# Patient Record
Sex: FEMALE | Race: BLACK OR AFRICAN AMERICAN | NOT HISPANIC OR LATINO | Employment: UNEMPLOYED | URBAN - NONMETROPOLITAN AREA
[De-identification: names, ages, dates, MRNs, and addresses within clinical notes are randomized per-mention and may not be internally consistent; named-entity substitution may affect disease eponyms.]

---

## 2017-01-05 ENCOUNTER — TRANSCRIBE ORDERS (OUTPATIENT)
Dept: ADMINISTRATIVE | Facility: HOSPITAL | Age: 37
End: 2017-01-05

## 2017-01-05 ENCOUNTER — APPOINTMENT (OUTPATIENT)
Dept: LAB | Facility: HOSPITAL | Age: 37
End: 2017-01-05

## 2017-01-05 DIAGNOSIS — Z51.81 ENCOUNTER FOR MONITORING DIURETIC THERAPY: ICD-10-CM

## 2017-01-05 DIAGNOSIS — I15.9 SECONDARY HYPERTENSION: Primary | ICD-10-CM

## 2017-01-05 DIAGNOSIS — Z79.899 ENCOUNTER FOR MONITORING DIURETIC THERAPY: ICD-10-CM

## 2017-01-05 LAB
ANION GAP SERPL CALCULATED.3IONS-SCNC: 11 MMOL/L (ref 4–13)
CHLORIDE SERPL-SCNC: 103 MMOL/L (ref 98–110)
CO2 SERPL-SCNC: 26 MMOL/L (ref 24–31)
POTASSIUM BLD-SCNC: 3.4 MMOL/L (ref 3.5–5.3)
SODIUM BLD-SCNC: 140 MMOL/L (ref 135–145)

## 2017-01-05 PROCEDURE — 80051 ELECTROLYTE PANEL: CPT | Performed by: FAMILY MEDICINE

## 2017-01-05 PROCEDURE — 36415 COLL VENOUS BLD VENIPUNCTURE: CPT | Performed by: FAMILY MEDICINE

## 2017-05-10 ENCOUNTER — TRANSCRIBE ORDERS (OUTPATIENT)
Dept: ADMINISTRATIVE | Facility: HOSPITAL | Age: 37
End: 2017-05-10

## 2017-05-10 ENCOUNTER — APPOINTMENT (OUTPATIENT)
Dept: LAB | Facility: HOSPITAL | Age: 37
End: 2017-05-10

## 2017-05-10 DIAGNOSIS — I10 UNSPECIFIED ESSENTIAL HYPERTENSION: Primary | ICD-10-CM

## 2017-05-10 DIAGNOSIS — Z00.01 ENCOUNTER FOR GENERAL ADULT MEDICAL EXAMINATION WITH ABNORMAL FINDINGS: ICD-10-CM

## 2017-05-10 LAB
ALBUMIN SERPL-MCNC: 3.8 G/DL (ref 3.5–5)
ALBUMIN/GLOB SERPL: 1 G/DL (ref 1.1–2.5)
ALP SERPL-CCNC: 85 U/L (ref 24–120)
ALT SERPL W P-5'-P-CCNC: 29 U/L (ref 0–54)
ANION GAP SERPL CALCULATED.3IONS-SCNC: 13 MMOL/L (ref 4–13)
ARTICHOKE IGE QN: 157 MG/DL (ref 0–99)
AST SERPL-CCNC: 41 U/L (ref 7–45)
BASOPHILS # BLD AUTO: 0.02 10*3/MM3 (ref 0–0.2)
BASOPHILS NFR BLD AUTO: 0.4 % (ref 0–2)
BILIRUB SERPL-MCNC: 0.4 MG/DL (ref 0.1–1)
BUN BLD-MCNC: 8 MG/DL (ref 5–21)
BUN/CREAT SERPL: 10.7 (ref 7–25)
CALCIUM SPEC-SCNC: 9.1 MG/DL (ref 8.4–10.4)
CHLORIDE SERPL-SCNC: 104 MMOL/L (ref 98–110)
CHOLEST SERPL-MCNC: 220 MG/DL (ref 130–200)
CO2 SERPL-SCNC: 21 MMOL/L (ref 24–31)
CREAT BLD-MCNC: 0.75 MG/DL (ref 0.5–1.4)
DEPRECATED RDW RBC AUTO: 39.3 FL (ref 40–54)
EOSINOPHIL # BLD AUTO: 0.06 10*3/MM3 (ref 0–0.7)
EOSINOPHIL NFR BLD AUTO: 1.1 % (ref 0–4)
ERYTHROCYTE [DISTWIDTH] IN BLOOD BY AUTOMATED COUNT: 13.1 % (ref 12–15)
GFR SERPL CREATININE-BSD FRML MDRD: 105 ML/MIN/1.73
GLOBULIN UR ELPH-MCNC: 4 GM/DL
GLUCOSE BLD-MCNC: 87 MG/DL (ref 70–100)
HBA1C MFR BLD: 5.3 %
HCT VFR BLD AUTO: 39 % (ref 37–47)
HDLC SERPL-MCNC: 45 MG/DL
HGB BLD-MCNC: 13.9 G/DL (ref 12–16)
IMM GRANULOCYTES # BLD: 0.02 10*3/MM3 (ref 0–0.03)
IMM GRANULOCYTES NFR BLD: 0.4 % (ref 0–5)
LDLC/HDLC SERPL: 3.57 {RATIO}
LYMPHOCYTES # BLD AUTO: 2.05 10*3/MM3 (ref 0.72–4.86)
LYMPHOCYTES NFR BLD AUTO: 36.5 % (ref 15–45)
MCH RBC QN AUTO: 29.1 PG (ref 28–32)
MCHC RBC AUTO-ENTMCNC: 35.6 G/DL (ref 33–36)
MCV RBC AUTO: 81.8 FL (ref 82–98)
MONOCYTES # BLD AUTO: 0.67 10*3/MM3 (ref 0.19–1.3)
MONOCYTES NFR BLD AUTO: 11.9 % (ref 4–12)
NEUTROPHILS # BLD AUTO: 2.79 10*3/MM3 (ref 1.87–8.4)
NEUTROPHILS NFR BLD AUTO: 49.7 % (ref 39–78)
PLATELET # BLD AUTO: 290 10*3/MM3 (ref 130–400)
PMV BLD AUTO: 8.9 FL (ref 6–12)
POTASSIUM BLD-SCNC: 3.3 MMOL/L (ref 3.5–5.3)
PROT SERPL-MCNC: 7.8 G/DL (ref 6.3–8.7)
RBC # BLD AUTO: 4.77 10*6/MM3 (ref 4.2–5.4)
SODIUM BLD-SCNC: 138 MMOL/L (ref 135–145)
T4 FREE SERPL-MCNC: 0.94 NG/DL (ref 0.78–2.19)
TRIGL SERPL-MCNC: 72 MG/DL (ref 0–149)
TSH SERPL DL<=0.05 MIU/L-ACNC: 0.24 MIU/ML (ref 0.47–4.68)
WBC NRBC COR # BLD: 5.61 10*3/MM3 (ref 4.8–10.8)

## 2017-05-10 PROCEDURE — 83036 HEMOGLOBIN GLYCOSYLATED A1C: CPT | Performed by: FAMILY MEDICINE

## 2017-05-10 PROCEDURE — 85025 COMPLETE CBC W/AUTO DIFF WBC: CPT | Performed by: FAMILY MEDICINE

## 2017-05-10 PROCEDURE — 80061 LIPID PANEL: CPT | Performed by: FAMILY MEDICINE

## 2017-05-10 PROCEDURE — 84443 ASSAY THYROID STIM HORMONE: CPT | Performed by: FAMILY MEDICINE

## 2017-05-10 PROCEDURE — 80053 COMPREHEN METABOLIC PANEL: CPT | Performed by: FAMILY MEDICINE

## 2017-05-10 PROCEDURE — 36415 COLL VENOUS BLD VENIPUNCTURE: CPT | Performed by: FAMILY MEDICINE

## 2017-05-10 PROCEDURE — 84439 ASSAY OF FREE THYROXINE: CPT | Performed by: FAMILY MEDICINE

## 2017-05-18 ENCOUNTER — TRANSCRIBE ORDERS (OUTPATIENT)
Dept: ADMINISTRATIVE | Facility: HOSPITAL | Age: 37
End: 2017-05-18

## 2017-05-18 ENCOUNTER — APPOINTMENT (OUTPATIENT)
Dept: LAB | Facility: HOSPITAL | Age: 37
End: 2017-05-18

## 2017-05-18 DIAGNOSIS — E87.6 HYPOKALEMIA: Primary | ICD-10-CM

## 2017-05-18 LAB — POTASSIUM BLD-SCNC: 3.4 MMOL/L (ref 3.5–5.3)

## 2017-05-18 PROCEDURE — 84132 ASSAY OF SERUM POTASSIUM: CPT | Performed by: FAMILY MEDICINE

## 2017-05-18 PROCEDURE — 36415 COLL VENOUS BLD VENIPUNCTURE: CPT | Performed by: FAMILY MEDICINE

## 2017-06-28 ENCOUNTER — OFFICE VISIT (OUTPATIENT)
Dept: RETAIL CLINIC | Facility: CLINIC | Age: 37
End: 2017-06-28

## 2017-06-28 VITALS
OXYGEN SATURATION: 99 % | HEART RATE: 87 BPM | TEMPERATURE: 98.7 F | DIASTOLIC BLOOD PRESSURE: 90 MMHG | SYSTOLIC BLOOD PRESSURE: 112 MMHG

## 2017-06-28 DIAGNOSIS — H61.21 IMPACTED CERUMEN OF RIGHT EAR: Primary | ICD-10-CM

## 2017-06-28 PROCEDURE — 69209 REMOVE IMPACTED EAR WAX UNI: CPT | Performed by: NURSE PRACTITIONER

## 2017-06-28 PROCEDURE — 99202 OFFICE O/P NEW SF 15 MIN: CPT | Performed by: NURSE PRACTITIONER

## 2017-06-28 RX ORDER — OLANZAPINE 10 MG/1
TABLET ORAL
Refills: 3 | COMMUNITY
Start: 2017-05-03 | End: 2019-08-10 | Stop reason: HOSPADM

## 2017-06-28 NOTE — PROGRESS NOTES
Subjective   Danelle Estrada is a 36 y.o. female.     Hearing Problem   This is a new problem. The current episode started more than 1 month ago (1 1/2 months). The problem has been unchanged. Pertinent negatives include no chest pain, congestion, coughing, fever, nausea, sore throat or vomiting. Associated symptoms comments: Typically cleans ears with Q-tip to get wax out.  Difficulty hearing for past 1 1/2 months.  If patient lays on left side, very difficult to hear TV. Exacerbated by: Laying on left ear. Treatments tried: Put some alcohol in ear. The treatment provided no relief.        The following portions of the patient's history were reviewed and updated as appropriate: allergies, current medications, past family history, past medical history, past social history, past surgical history and problem list.    Review of Systems   Constitutional: Negative.  Negative for fever.   HENT: Negative for congestion, ear discharge, ear pain and sore throat.    Eyes: Negative.  Negative for discharge.   Respiratory: Negative for cough.    Cardiovascular: Negative for chest pain.   Gastrointestinal: Negative for diarrhea, nausea and vomiting.       Objective   Physical Exam   Constitutional: She appears well-developed and well-nourished. No distress.   HENT:   Right Ear: External ear normal. Tympanic membrane is not erythematous.   Left Ear: External ear normal. Tympanic membrane is not erythematous.   Nose: Right sinus exhibits no maxillary sinus tenderness and no frontal sinus tenderness. Left sinus exhibits no maxillary sinus tenderness and no frontal sinus tenderness.   Mouth/Throat: Oropharynx is clear and moist. No oropharyngeal exudate or posterior oropharyngeal erythema.   Prior to ear lavage, right TM completely blocked with cerumen.  Left TM visible with minimal cerumen near TM.    Neck: Neck supple.   Cardiovascular: Normal rate, regular rhythm and normal heart sounds.  Exam reveals no gallop and no friction  rub.    No murmur heard.  Pulmonary/Chest: Effort normal and breath sounds normal. No respiratory distress. She has no wheezes. She has no rales.   Lymphadenopathy:     She has no cervical adenopathy.   Neurological: She is alert.   Skin: Skin is warm. She is not diaphoretic.   Psychiatric: She has a normal mood and affect. Her behavior is normal.       Assessment/Plan   Danelle was seen today for hearing problem.    Diagnoses and all orders for this visit:    Impacted cerumen of right ear          Use Debrox ear drops as directed for 2-3 days and return for repeat flushing  Avoid Q-tips in ear

## 2017-06-28 NOTE — PATIENT INSTRUCTIONS
Use Debrox ear drops as directed for 2-3 days and return for repeat flushing  Avoid Q-tips in ear    Earwax Buildup  Your ears make a substance called earwax. It may also be called cerumen. Sometimes, too much earwax builds up in your ear canal. This can cause ear pain and make it harder for you to hear.  CAUSES  This condition is caused by too much earwax production or buildup.  RISK FACTORS  The following factors may make you more likely to develop this condition:  · Cleaning your ears often with swabs.  · Having narrow ear canals.  · Having earwax that is overly thick or sticky.  · Having eczema.  · Being dehydrated.  SYMPTOMS  Symptoms of this condition include:  · Reduced hearing.  · Ear drainage.  · Ear pain.  · Ear itch.  · A feeling of fullness in the ear or feeling that the ear is plugged.  · Ringing in the ear.  · Coughing.  DIAGNOSIS  Your health care provider can diagnose this condition based on your symptoms and medical history. Your health care provider will also do an ear exam to look inside your ear with a scope (otoscope). You may also have a hearing test.  TREATMENT  Treatment for this condition includes:  · Over-the-counter or prescription ear drops to soften the earwax.  · Earwax removal by a health care provider. This may be done:    By flushing the ear with body-temperature water.    With a medical instrument that has a loop at the end (earwax curette).    With a suction device.  HOME CARE INSTRUCTIONS  · Take over-the-counter and prescription medicines only as told by your health care provider.  · Do not put any objects, including an ear swab, into your ear. You can clean the opening of your ear canal with a washcloth.  · Drink enough water to keep your urine clear or pale yellow.  · If you have frequent earwax buildup or you use hearing aids, consider seeing your health care provider every 6-12 months for routine preventive ear cleanings. Keep all follow-up visits as told by your health care  provider.  SEEK MEDICAL CARE IF:  · You have ear pain.  · Your condition does not improve with treatment.  · You have hearing loss.  · You have blood, pus, or other fluid coming from your ear.     This information is not intended to replace advice given to you by your health care provider. Make sure you discuss any questions you have with your health care provider.     Document Released: 01/25/2006 Document Revised: 04/10/2017 Document Reviewed: 08/03/2016  [employer] Interactive Patient Education ©2017 [employer]

## 2017-06-28 NOTE — PROGRESS NOTES
Procedure   Ear Cerumen Removal Instrumentation  Date/Time: 6/28/2017 9:37 AM  Performed by: DAMIÁN MONTGOMERY  Authorized by: DAMIÁN MONTGOMERY  Consent: Verbal consent obtained.  Risks and benefits: risks, benefits and alternatives were discussed  Consent given by: patient  Patient understanding: patient states understanding of the procedure being performed  Local anesthetic: none  Location details: right ear  Procedure type: irrigation  Patient sedated: no  Patient tolerance: Patient tolerated the procedure well with no immediate complications  Comments: Moderate amount of cerumen flakes removed from canal.  TM exposed but not completely cleared of cerumen.  After 2 full bottles of tap water lavage, patient states hearing has improved.  Patient instructed to use Debrox in right ear as directed x 2-3 days and return for repeat flushing.

## 2017-07-05 ENCOUNTER — OFFICE VISIT (OUTPATIENT)
Dept: RETAIL CLINIC | Facility: CLINIC | Age: 37
End: 2017-07-05

## 2017-07-05 DIAGNOSIS — H61.23 BILATERAL IMPACTED CERUMEN: Primary | ICD-10-CM

## 2017-07-05 PROCEDURE — 69210 REMOVE IMPACTED EAR WAX UNI: CPT | Performed by: NURSE PRACTITIONER

## 2017-07-05 PROCEDURE — 99213 OFFICE O/P EST LOW 20 MIN: CPT | Performed by: NURSE PRACTITIONER

## 2017-07-05 RX ORDER — CIPROFLOXACIN AND DEXAMETHASONE 3; 1 MG/ML; MG/ML
4 SUSPENSION/ DROPS AURICULAR (OTIC) 2 TIMES DAILY
Qty: 7.5 ML | Refills: 0 | Status: SHIPPED | OUTPATIENT
Start: 2017-07-05 | End: 2019-08-17

## 2017-07-05 NOTE — PROGRESS NOTES
Chief Complaint   Patient presents with   • Cerumen Impaction     Subjective   Danelle Estrada is a 36 y.o. female who presents to the clinic today with complaints bilateral cerumen impaction. She has had hearing loss in her right ear. She was hear last week and she was instructed to use Debrox and come back. She has been using this in her right ear as instructed and started using in left ear yesterday. She is hearing a little better in right ear.   Hearing Problem   This is a new problem. The current episode started 1 to 4 weeks ago. The problem occurs constantly. The problem has been gradually improving. Pertinent negatives include no abdominal pain, anorexia, arthralgias, change in bowel habit, chest pain, chills, congestion, coughing, diaphoresis, fatigue, fever, headaches, joint swelling, myalgias, nausea, neck pain, numbness, rash, sore throat, swollen glands, urinary symptoms, vertigo, visual change, vomiting or weakness. Exacerbated by: Using Q-tips. Treatments tried: Debrox. The treatment provided mild relief.         Current Outpatient Prescriptions:   •  ciprofloxacin-dexamethasone (CIPRODEX) 0.3-0.1 % otic suspension, Administer 4 drops to the right ear 2 (Two) Times a Day., Disp: 7.5 mL, Rfl: 0  •  Multiple Vitamins-Minerals (MULTIVITAMIN ADULTS PO), Take  by mouth., Disp: , Rfl:   •  NON FORMULARY, Birth control, Disp: , Rfl:   •  OLANZapine (zyPREXA) 10 MG tablet, , Disp: , Rfl: 3  •  VERAPAMIL HCL ER PO, Take  by mouth., Disp: , Rfl:     Allergies:  Review of patient's allergies indicates no known allergies.    Past Medical History:   Diagnosis Date   • Allergic    • Bipolar disorder    • Depression    • Hypertension      History reviewed. No pertinent surgical history.  History reviewed. No pertinent family history.  Social History   Substance Use Topics   • Smoking status: Never Smoker   • Smokeless tobacco: Never Used   • Alcohol use No       Review of Systems  Review of Systems    Constitutional: Negative.  Negative for chills, diaphoresis, fatigue and fever.   HENT: Positive for hearing loss (right ear). Negative for congestion and sore throat.    Eyes: Negative.    Respiratory: Negative.  Negative for cough.    Cardiovascular: Negative.  Negative for chest pain.   Gastrointestinal: Negative.  Negative for abdominal pain, anorexia, change in bowel habit, nausea and vomiting.   Endocrine: Negative.    Genitourinary: Negative.    Musculoskeletal: Negative.  Negative for arthralgias, joint swelling, myalgias and neck pain.   Skin: Negative.  Negative for rash.   Allergic/Immunologic: Negative.    Neurological: Negative.  Negative for vertigo, weakness, numbness and headaches.   Hematological: Negative.    Psychiatric/Behavioral: Negative.        Objective   There were no vitals taken for this visit.      Physical Exam   Constitutional: She is oriented to person, place, and time. She appears well-nourished.   HENT:   Head: Normocephalic and atraumatic.   Right Ear: External ear and ear canal normal. There is tenderness. Tympanic membrane is erythematous. Decreased hearing (minimal view of TM. After cerumen removal TM visible and hearing much better ) is noted.   Left Ear: Hearing, tympanic membrane and external ear normal. No decreased hearing (cerumen covering half of TM. After cerumen, removal TM was visible. ) is noted.   Pulmonary/Chest: Effort normal.   Neurological: She is alert and oriented to person, place, and time.   Skin: Skin is warm and dry.   Psychiatric: She has a normal mood and affect. Her behavior is normal.       Assessment/Plan     Danelle was seen today for cerumen impaction.    Diagnoses and all orders for this visit:    Bilateral impacted cerumen  -     Ear Cerumen Removal Instrumentation    Other orders  -     ciprofloxacin-dexamethasone (CIPRODEX) 0.3-0.1 % otic suspension; Administer 4 drops to the right ear 2 (Two) Times a Day.

## 2017-07-05 NOTE — PROGRESS NOTES
Procedure   Ear Cerumen Removal Instrumentation  Date/Time: 7/5/2017 11:12 AM  Performed by: LEONARD MCDONALD  Authorized by: LEONARD MCDONALD  Consent: Verbal consent obtained.  Consent given by: patient  Patient understanding: patient states understanding of the procedure being performed  Patient consent: the patient's understanding of the procedure matches consent given  Patient identity confirmed: verbally with patient and provided demographic data  Local anesthetic: none  Location details: bilateral ears  Procedure type: curette and irrigation  Patient sedated: no  Patient tolerance: Patient tolerated the procedure well with no immediate complications  Comments: Erythema of right ear canal and tenderness with manipulation. Ciprodex to right ear.

## 2017-07-05 NOTE — PATIENT INSTRUCTIONS
Earwax Buildup  Your ears make a substance called earwax. It may also be called cerumen. Sometimes, too much earwax builds up in your ear canal. This can cause ear pain and make it harder for you to hear.  CAUSES  This condition is caused by too much earwax production or buildup.  RISK FACTORS  The following factors may make you more likely to develop this condition:  · Cleaning your ears often with swabs.  · Having narrow ear canals.  · Having earwax that is overly thick or sticky.  · Having eczema.  · Being dehydrated.  SYMPTOMS  Symptoms of this condition include:  · Reduced hearing.  · Ear drainage.  · Ear pain.  · Ear itch.  · A feeling of fullness in the ear or feeling that the ear is plugged.  · Ringing in the ear.  · Coughing.  DIAGNOSIS  Your health care provider can diagnose this condition based on your symptoms and medical history. Your health care provider will also do an ear exam to look inside your ear with a scope (otoscope). You may also have a hearing test.  TREATMENT  Treatment for this condition includes:  · Over-the-counter or prescription ear drops to soften the earwax.  · Earwax removal by a health care provider. This may be done:    By flushing the ear with body-temperature water.    With a medical instrument that has a loop at the end (earwax curette).    With a suction device.  HOME CARE INSTRUCTIONS  · Take over-the-counter and prescription medicines only as told by your health care provider.  · Do not put any objects, including an ear swab, into your ear. You can clean the opening of your ear canal with a washcloth.  · Drink enough water to keep your urine clear or pale yellow.  · If you have frequent earwax buildup or you use hearing aids, consider seeing your health care provider every 6-12 months for routine preventive ear cleanings. Keep all follow-up visits as told by your health care provider.  SEEK MEDICAL CARE IF:  · You have ear pain.  · Your condition does not improve with  treatment.  · You have hearing loss.  · You have blood, pus, or other fluid coming from your ear.     This information is not intended to replace advice given to you by your health care provider. Make sure you discuss any questions you have with your health care provider.     Document Released: 01/25/2006 Document Revised: 04/10/2017 Document Reviewed: 08/03/2016  [employer] Interactive Patient Education ©2017 [employer]

## 2018-05-09 ENCOUNTER — APPOINTMENT (OUTPATIENT)
Dept: LAB | Facility: HOSPITAL | Age: 38
End: 2018-05-09

## 2018-05-09 ENCOUNTER — TRANSCRIBE ORDERS (OUTPATIENT)
Dept: ADMINISTRATIVE | Facility: HOSPITAL | Age: 38
End: 2018-05-09

## 2018-05-09 DIAGNOSIS — I10 HYPERTENSION, UNSPECIFIED TYPE: ICD-10-CM

## 2018-05-09 DIAGNOSIS — Z00.01 ENCOUNTER FOR GENERAL ADULT MEDICAL EXAMINATION WITH ABNORMAL FINDINGS: Primary | ICD-10-CM

## 2018-05-09 LAB
ALBUMIN SERPL-MCNC: 3.8 G/DL (ref 3.5–5)
ALBUMIN/GLOB SERPL: 1 G/DL (ref 1.1–2.5)
ALP SERPL-CCNC: 85 U/L (ref 24–120)
ALT SERPL W P-5'-P-CCNC: 22 U/L (ref 0–54)
ANION GAP SERPL CALCULATED.3IONS-SCNC: 10 MMOL/L (ref 4–13)
ARTICHOKE IGE QN: 164 MG/DL (ref 0–99)
AST SERPL-CCNC: 27 U/L (ref 7–45)
BASOPHILS # BLD AUTO: 0.04 10*3/MM3 (ref 0–0.2)
BASOPHILS NFR BLD AUTO: 0.7 % (ref 0–2)
BILIRUB SERPL-MCNC: 0.5 MG/DL (ref 0.1–1)
BUN BLD-MCNC: 9 MG/DL (ref 5–21)
BUN/CREAT SERPL: 10.7 (ref 7–25)
CALCIUM SPEC-SCNC: 9.8 MG/DL (ref 8.4–10.4)
CHLORIDE SERPL-SCNC: 107 MMOL/L (ref 98–110)
CHOLEST SERPL-MCNC: 219 MG/DL (ref 130–200)
CO2 SERPL-SCNC: 24 MMOL/L (ref 24–31)
CREAT BLD-MCNC: 0.84 MG/DL (ref 0.5–1.4)
DEPRECATED RDW RBC AUTO: 37 FL (ref 40–54)
EOSINOPHIL # BLD AUTO: 0.04 10*3/MM3 (ref 0–0.7)
EOSINOPHIL NFR BLD AUTO: 0.7 % (ref 0–4)
ERYTHROCYTE [DISTWIDTH] IN BLOOD BY AUTOMATED COUNT: 12.2 % (ref 12–15)
GFR SERPL CREATININE-BSD FRML MDRD: 91 ML/MIN/1.73
GLOBULIN UR ELPH-MCNC: 3.8 GM/DL
GLUCOSE BLD-MCNC: 84 MG/DL (ref 70–100)
HCT VFR BLD AUTO: 40.6 % (ref 37–47)
HDLC SERPL-MCNC: 39 MG/DL
HGB BLD-MCNC: 14.2 G/DL (ref 12–16)
IMM GRANULOCYTES # BLD: 0.02 10*3/MM3 (ref 0–0.03)
IMM GRANULOCYTES NFR BLD: 0.3 % (ref 0–5)
LDLC/HDLC SERPL: 4.25 {RATIO}
LYMPHOCYTES # BLD AUTO: 1.8 10*3/MM3 (ref 0.72–4.86)
LYMPHOCYTES NFR BLD AUTO: 30.6 % (ref 15–45)
MCH RBC QN AUTO: 29.5 PG (ref 28–32)
MCHC RBC AUTO-ENTMCNC: 35 G/DL (ref 33–36)
MCV RBC AUTO: 84.2 FL (ref 82–98)
MONOCYTES # BLD AUTO: 0.69 10*3/MM3 (ref 0.19–1.3)
MONOCYTES NFR BLD AUTO: 11.7 % (ref 4–12)
NEUTROPHILS # BLD AUTO: 3.3 10*3/MM3 (ref 1.87–8.4)
NEUTROPHILS NFR BLD AUTO: 56 % (ref 39–78)
NRBC BLD MANUAL-RTO: 0 /100 WBC (ref 0–0)
PLATELET # BLD AUTO: 308 10*3/MM3 (ref 130–400)
PMV BLD AUTO: 8.6 FL (ref 6–12)
POTASSIUM BLD-SCNC: 4.2 MMOL/L (ref 3.5–5.3)
PROT SERPL-MCNC: 7.6 G/DL (ref 6.3–8.7)
RBC # BLD AUTO: 4.82 10*6/MM3 (ref 4.2–5.4)
SODIUM BLD-SCNC: 141 MMOL/L (ref 135–145)
T4 FREE SERPL-MCNC: 1.06 NG/DL (ref 0.78–2.19)
TRIGL SERPL-MCNC: 72 MG/DL (ref 0–149)
TSH SERPL DL<=0.05 MIU/L-ACNC: 0.43 MIU/ML (ref 0.47–4.68)
WBC NRBC COR # BLD: 5.89 10*3/MM3 (ref 4.8–10.8)

## 2018-05-09 PROCEDURE — 36415 COLL VENOUS BLD VENIPUNCTURE: CPT | Performed by: FAMILY MEDICINE

## 2018-05-09 PROCEDURE — 80053 COMPREHEN METABOLIC PANEL: CPT | Performed by: FAMILY MEDICINE

## 2018-05-09 PROCEDURE — 84443 ASSAY THYROID STIM HORMONE: CPT | Performed by: FAMILY MEDICINE

## 2018-05-09 PROCEDURE — 85025 COMPLETE CBC W/AUTO DIFF WBC: CPT | Performed by: FAMILY MEDICINE

## 2018-05-09 PROCEDURE — 80061 LIPID PANEL: CPT | Performed by: FAMILY MEDICINE

## 2018-05-09 PROCEDURE — 84439 ASSAY OF FREE THYROXINE: CPT | Performed by: FAMILY MEDICINE

## 2018-09-05 ENCOUNTER — TRANSCRIBE ORDERS (OUTPATIENT)
Dept: ADMINISTRATIVE | Facility: HOSPITAL | Age: 38
End: 2018-09-05

## 2018-09-05 ENCOUNTER — APPOINTMENT (OUTPATIENT)
Dept: LAB | Facility: HOSPITAL | Age: 38
End: 2018-09-05

## 2018-09-05 DIAGNOSIS — E78.00 ELEVATED CHOLESTEROL: Primary | ICD-10-CM

## 2018-09-05 LAB — CHOLEST SERPL-MCNC: 173 MG/DL (ref 130–200)

## 2018-09-05 PROCEDURE — 36415 COLL VENOUS BLD VENIPUNCTURE: CPT

## 2018-09-05 PROCEDURE — 82465 ASSAY BLD/SERUM CHOLESTEROL: CPT | Performed by: FAMILY MEDICINE

## 2019-05-28 ENCOUNTER — TRANSCRIBE ORDERS (OUTPATIENT)
Dept: ADMINISTRATIVE | Facility: HOSPITAL | Age: 39
End: 2019-05-28

## 2019-05-28 ENCOUNTER — APPOINTMENT (OUTPATIENT)
Dept: LAB | Facility: HOSPITAL | Age: 39
End: 2019-05-28

## 2019-05-28 DIAGNOSIS — Z00.01 ENCOUNTER FOR GENERAL ADULT MEDICAL EXAMINATION WITH ABNORMAL FINDINGS: Primary | ICD-10-CM

## 2019-05-28 LAB
ALBUMIN SERPL-MCNC: 3.8 G/DL (ref 3.5–5)
ALBUMIN/GLOB SERPL: 1.2 G/DL (ref 1.1–2.5)
ALP SERPL-CCNC: 69 U/L (ref 24–120)
ALT SERPL W P-5'-P-CCNC: 62 U/L (ref 0–54)
ANION GAP SERPL CALCULATED.3IONS-SCNC: 10 MMOL/L (ref 4–13)
ARTICHOKE IGE QN: 111 MG/DL (ref 0–99)
AST SERPL-CCNC: 46 U/L (ref 7–45)
BASOPHILS # BLD AUTO: 0.03 10*3/MM3 (ref 0–0.2)
BASOPHILS NFR BLD AUTO: 0.5 % (ref 0–2)
BILIRUB SERPL-MCNC: 0.4 MG/DL (ref 0.1–1)
BUN BLD-MCNC: 5 MG/DL (ref 5–21)
BUN/CREAT SERPL: 7.6 (ref 7–25)
CALCIUM SPEC-SCNC: 8.8 MG/DL (ref 8.4–10.4)
CHLORIDE SERPL-SCNC: 99 MMOL/L (ref 98–110)
CHOLEST SERPL-MCNC: 182 MG/DL (ref 130–200)
CO2 SERPL-SCNC: 22 MMOL/L (ref 24–31)
CREAT BLD-MCNC: 0.66 MG/DL (ref 0.5–1.4)
DEPRECATED RDW RBC AUTO: 39.6 FL (ref 40–54)
EOSINOPHIL # BLD AUTO: 0.05 10*3/MM3 (ref 0–0.7)
EOSINOPHIL NFR BLD AUTO: 0.8 % (ref 0–4)
ERYTHROCYTE [DISTWIDTH] IN BLOOD BY AUTOMATED COUNT: 12.5 % (ref 12–15)
GFR SERPL CREATININE-BSD FRML MDRD: 120 ML/MIN/1.73
GLOBULIN UR ELPH-MCNC: 3.3 GM/DL
GLUCOSE BLD-MCNC: 69 MG/DL (ref 70–100)
HCT VFR BLD AUTO: 37.8 % (ref 37–47)
HDLC SERPL-MCNC: 41 MG/DL
HGB BLD-MCNC: 13 G/DL (ref 12–16)
IMM GRANULOCYTES # BLD AUTO: 0.02 10*3/MM3 (ref 0–0.05)
IMM GRANULOCYTES NFR BLD AUTO: 0.3 % (ref 0–5)
LDLC/HDLC SERPL: 2.98 {RATIO}
LYMPHOCYTES # BLD AUTO: 1.75 10*3/MM3 (ref 0.72–4.86)
LYMPHOCYTES NFR BLD AUTO: 28.1 % (ref 15–45)
MCH RBC QN AUTO: 29.7 PG (ref 28–32)
MCHC RBC AUTO-ENTMCNC: 34.4 G/DL (ref 33–36)
MCV RBC AUTO: 86.3 FL (ref 82–98)
MONOCYTES # BLD AUTO: 0.61 10*3/MM3 (ref 0.19–1.3)
MONOCYTES NFR BLD AUTO: 9.8 % (ref 4–12)
NEUTROPHILS # BLD AUTO: 3.77 10*3/MM3 (ref 1.87–8.4)
NEUTROPHILS NFR BLD AUTO: 60.5 % (ref 39–78)
NRBC BLD AUTO-RTO: 0 /100 WBC (ref 0–0.2)
PLATELET # BLD AUTO: 317 10*3/MM3 (ref 130–400)
PMV BLD AUTO: 8.8 FL (ref 6–12)
POTASSIUM BLD-SCNC: 4.2 MMOL/L (ref 3.5–5.3)
PROT SERPL-MCNC: 7.1 G/DL (ref 6.3–8.7)
RBC # BLD AUTO: 4.38 10*6/MM3 (ref 4.2–5.4)
SODIUM BLD-SCNC: 131 MMOL/L (ref 135–145)
T4 FREE SERPL-MCNC: 0.8 NG/DL (ref 0.78–2.19)
TRIGL SERPL-MCNC: 94 MG/DL (ref 0–149)
TSH SERPL DL<=0.05 MIU/L-ACNC: 0.15 MIU/ML (ref 0.47–4.68)
WBC NRBC COR # BLD: 6.23 10*3/MM3 (ref 4.8–10.8)

## 2019-05-28 PROCEDURE — 84443 ASSAY THYROID STIM HORMONE: CPT | Performed by: FAMILY MEDICINE

## 2019-05-28 PROCEDURE — 36415 COLL VENOUS BLD VENIPUNCTURE: CPT | Performed by: FAMILY MEDICINE

## 2019-05-28 PROCEDURE — 80053 COMPREHEN METABOLIC PANEL: CPT | Performed by: FAMILY MEDICINE

## 2019-05-28 PROCEDURE — 85025 COMPLETE CBC W/AUTO DIFF WBC: CPT | Performed by: FAMILY MEDICINE

## 2019-05-28 PROCEDURE — 80061 LIPID PANEL: CPT | Performed by: FAMILY MEDICINE

## 2019-05-28 PROCEDURE — 84439 ASSAY OF FREE THYROXINE: CPT | Performed by: FAMILY MEDICINE

## 2019-06-07 ENCOUNTER — NURSE TRIAGE (OUTPATIENT)
Dept: CALL CENTER | Facility: HOSPITAL | Age: 39
End: 2019-06-07

## 2019-06-07 NOTE — TELEPHONE ENCOUNTER
"She has herpes can she be an organ donor? Told her further testing would have to be done at the time and that she is not totally ruled out per intranet.     Reason for Disposition  • General information question, no triage required and triager able to answer question    Additional Information  • Negative: [1] Caller is not with the adult (patient) AND [2] reporting urgent symptoms  • Negative: Lab result questions  • Negative: Medication questions  • Negative: Caller cannot be reached by phone  • Negative: Caller has already spoken to PCP or another triager  • Negative: RN needs further essential information from caller in order to complete triage  • Negative: Requesting regular office appointment  • Negative: [1] Caller requesting NON-URGENT health information AND [2] PCP's office is the best resource  • Negative: Health Information question, no triage required and triager able to answer question  • Negative: Question about upcoming scheduled test, no triage required and triager able to answer question  • Negative: [1] Caller is not with the adult (patient) AND [2] probable NON-URGENT symptoms    Answer Assessment - Initial Assessment Questions  1. REASON FOR CALL or QUESTION: \"What is your reason for calling today?\" or \"How can I best help you?\" or \"What question do you have that I can help answer?\"      Wanting to know if she can donate organs with herpes    Protocols used: INFORMATION ONLY CALL-ADULT-      "

## 2019-06-19 ENCOUNTER — TRANSCRIBE ORDERS (OUTPATIENT)
Dept: ADMINISTRATIVE | Facility: HOSPITAL | Age: 39
End: 2019-06-19

## 2019-06-19 ENCOUNTER — APPOINTMENT (OUTPATIENT)
Dept: LAB | Facility: HOSPITAL | Age: 39
End: 2019-06-19

## 2019-06-19 DIAGNOSIS — E16.2 HYPOGLYCEMIA: Primary | ICD-10-CM

## 2019-06-19 DIAGNOSIS — E87.1 HYPONATREMIA: ICD-10-CM

## 2019-06-19 LAB
ALBUMIN SERPL-MCNC: 3.9 G/DL (ref 3.5–5)
ALBUMIN/GLOB SERPL: 1.2 G/DL (ref 1.1–2.5)
ALP SERPL-CCNC: 82 U/L (ref 24–120)
ALT SERPL W P-5'-P-CCNC: 30 U/L (ref 0–54)
ANION GAP SERPL CALCULATED.3IONS-SCNC: 11 MMOL/L (ref 4–13)
AST SERPL-CCNC: 38 U/L (ref 7–45)
BILIRUB SERPL-MCNC: 0.4 MG/DL (ref 0.1–1)
BUN BLD-MCNC: 3 MG/DL (ref 5–21)
BUN/CREAT SERPL: 4.6 (ref 7–25)
CALCIUM SPEC-SCNC: 9.1 MG/DL (ref 8.4–10.4)
CHLORIDE SERPL-SCNC: 102 MMOL/L (ref 98–110)
CO2 SERPL-SCNC: 22 MMOL/L (ref 24–31)
CREAT BLD-MCNC: 0.65 MG/DL (ref 0.5–1.4)
GFR SERPL CREATININE-BSD FRML MDRD: 122 ML/MIN/1.73
GLOBULIN UR ELPH-MCNC: 3.3 GM/DL
GLUCOSE BLD-MCNC: 79 MG/DL (ref 70–100)
POTASSIUM BLD-SCNC: 3.9 MMOL/L (ref 3.5–5.3)
PROT SERPL-MCNC: 7.2 G/DL (ref 6.3–8.7)
SODIUM BLD-SCNC: 135 MMOL/L (ref 135–145)

## 2019-06-19 PROCEDURE — 36415 COLL VENOUS BLD VENIPUNCTURE: CPT | Performed by: FAMILY MEDICINE

## 2019-06-19 PROCEDURE — 80053 COMPREHEN METABOLIC PANEL: CPT | Performed by: FAMILY MEDICINE

## 2019-07-03 ENCOUNTER — NURSE TRIAGE (OUTPATIENT)
Dept: CALL CENTER | Facility: HOSPITAL | Age: 39
End: 2019-07-03

## 2019-07-03 NOTE — TELEPHONE ENCOUNTER
"She is calling because she would like to know names of Endrocrinologist. In [place]. Explained that [place] does not have these type of providers. Offered to call Mad. She denied.     Reason for Disposition  • Health Information question, no triage required and triager able to answer question    Additional Information  • Negative: [1] Caller is not with the adult (patient) AND [2] reporting urgent symptoms  • Negative: Lab result questions  • Negative: Medication questions  • Negative: Caller cannot be reached by phone  • Negative: Caller has already spoken to PCP or another triager  • Negative: RN needs further essential information from caller in order to complete triage  • Negative: Requesting regular office appointment  • Negative: [1] Caller requesting NON-URGENT health information AND [2] PCP's office is the best resource    Answer Assessment - Initial Assessment Questions  1. REASON FOR CALL or QUESTION: \"What is your reason for calling today?\" or \"How can I best help you?\" or \"What question do you have that I can help answer?\"      See note    Protocols used: INFORMATION ONLY CALL-ADULT-      "

## 2019-07-07 ENCOUNTER — NURSE TRIAGE (OUTPATIENT)
Dept: CALL CENTER | Facility: HOSPITAL | Age: 39
End: 2019-07-07

## 2019-07-07 NOTE — TELEPHONE ENCOUNTER
"Information given to caller regarding a local endocrinologist.  Recommended Dr. Powers at [facility].    Reason for Disposition  • General information question, no triage required and triager able to answer question    Additional Information  • Negative: [1] Caller is not with the adult (patient) AND [2] reporting urgent symptoms  • Negative: Lab result questions  • Negative: Medication questions  • Negative: Caller cannot be reached by phone  • Negative: Caller has already spoken to PCP or another triager  • Negative: RN needs further essential information from caller in order to complete triage  • Negative: Requesting regular office appointment  • Negative: [1] Caller requesting NON-URGENT health information AND [2] PCP's office is the best resource  • Negative: Health Information question, no triage required and triager able to answer question    Answer Assessment - Initial Assessment Questions  1. REASON FOR CALL or QUESTION: \"What is your reason for calling today?\" or \"How can I best help you?\" or \"What question do you have that I can help answer?\"      Is there and endocrinologist in [place]?    Protocols used: INFORMATION ONLY CALL-ADULT-      "

## 2019-07-24 ENCOUNTER — APPOINTMENT (OUTPATIENT)
Dept: LAB | Facility: HOSPITAL | Age: 39
End: 2019-07-24

## 2019-07-24 ENCOUNTER — TRANSCRIBE ORDERS (OUTPATIENT)
Dept: LAB | Facility: HOSPITAL | Age: 39
End: 2019-07-24

## 2019-07-24 DIAGNOSIS — Z01.818 PRE-OP EVALUATION: Primary | ICD-10-CM

## 2019-07-24 LAB
ANION GAP SERPL CALCULATED.3IONS-SCNC: 6 MMOL/L (ref 4–13)
BUN BLD-MCNC: 8 MG/DL (ref 5–21)
BUN/CREAT SERPL: 11.6 (ref 7–25)
CALCIUM SPEC-SCNC: 9.4 MG/DL (ref 8.4–10.4)
CHLORIDE SERPL-SCNC: 107 MMOL/L (ref 98–110)
CO2 SERPL-SCNC: 24 MMOL/L (ref 24–31)
CREAT BLD-MCNC: 0.69 MG/DL (ref 0.5–1.4)
DEPRECATED RDW RBC AUTO: 39.3 FL (ref 40–54)
ERYTHROCYTE [DISTWIDTH] IN BLOOD BY AUTOMATED COUNT: 12.5 % (ref 12–15)
GFR SERPL CREATININE-BSD FRML MDRD: 114 ML/MIN/1.73
GLUCOSE BLD-MCNC: 109 MG/DL (ref 70–100)
HCT VFR BLD AUTO: 36.9 % (ref 37–47)
HGB BLD-MCNC: 12.7 G/DL (ref 12–16)
MCH RBC QN AUTO: 29.8 PG (ref 28–32)
MCHC RBC AUTO-ENTMCNC: 34.4 G/DL (ref 33–36)
MCV RBC AUTO: 86.6 FL (ref 82–98)
PLATELET # BLD AUTO: 303 10*3/MM3 (ref 130–400)
PMV BLD AUTO: 8.6 FL (ref 6–12)
POTASSIUM BLD-SCNC: 4.3 MMOL/L (ref 3.5–5.3)
RBC # BLD AUTO: 4.26 10*6/MM3 (ref 4.2–5.4)
SODIUM BLD-SCNC: 137 MMOL/L (ref 135–145)
WBC NRBC COR # BLD: 4.46 10*3/MM3 (ref 4.8–10.8)

## 2019-07-24 PROCEDURE — 85027 COMPLETE CBC AUTOMATED: CPT | Performed by: PLASTIC SURGERY

## 2019-07-24 PROCEDURE — 36415 COLL VENOUS BLD VENIPUNCTURE: CPT | Performed by: PLASTIC SURGERY

## 2019-07-24 PROCEDURE — 80048 BASIC METABOLIC PNL TOTAL CA: CPT | Performed by: PLASTIC SURGERY

## 2019-08-09 ENCOUNTER — HOSPITAL ENCOUNTER (OUTPATIENT)
Facility: HOSPITAL | Age: 39
Setting detail: OBSERVATION
Discharge: HOME | End: 2019-08-10
Attending: EMERGENCY MEDICINE | Admitting: PLASTIC SURGERY

## 2019-08-09 DIAGNOSIS — I10 ESSENTIAL HYPERTENSION: ICD-10-CM

## 2019-08-09 DIAGNOSIS — Z98.890 POST-OPERATIVE STATE: Primary | ICD-10-CM

## 2019-08-09 LAB
ALBUMIN SERPL-MCNC: 3.6 G/DL (ref 3.5–5)
ALBUMIN/GLOB SERPL: 1.2 G/DL (ref 1.1–2.5)
ALP SERPL-CCNC: 72 U/L (ref 24–120)
ALT SERPL W P-5'-P-CCNC: 28 U/L (ref 0–54)
ANION GAP SERPL CALCULATED.3IONS-SCNC: 7 MMOL/L (ref 4–13)
AST SERPL-CCNC: 24 U/L (ref 7–45)
BILIRUB SERPL-MCNC: 0.3 MG/DL (ref 0.1–1)
BUN BLD-MCNC: 7 MG/DL (ref 5–21)
BUN/CREAT SERPL: 8.9 (ref 7–25)
CALCIUM SPEC-SCNC: 8.7 MG/DL (ref 8.4–10.4)
CHLORIDE SERPL-SCNC: 104 MMOL/L (ref 98–110)
CO2 SERPL-SCNC: 26 MMOL/L (ref 24–31)
CREAT BLD-MCNC: 0.79 MG/DL (ref 0.5–1.4)
DEPRECATED RDW RBC AUTO: 39.3 FL (ref 37–54)
ERYTHROCYTE [DISTWIDTH] IN BLOOD BY AUTOMATED COUNT: 12.2 % (ref 12.3–15.4)
GFR SERPL CREATININE-BSD FRML MDRD: 98 ML/MIN/1.73
GLOBULIN UR ELPH-MCNC: 3.1 GM/DL
GLUCOSE BLD-MCNC: 142 MG/DL (ref 70–100)
HCT VFR BLD AUTO: 36.9 % (ref 34–46.6)
HGB BLD-MCNC: 12.7 G/DL (ref 12–15.9)
MCH RBC QN AUTO: 30.2 PG (ref 26.6–33)
MCHC RBC AUTO-ENTMCNC: 34.4 G/DL (ref 31.5–35.7)
MCV RBC AUTO: 87.9 FL (ref 79–97)
PLATELET # BLD AUTO: 303 10*3/MM3 (ref 140–450)
PMV BLD AUTO: 8.2 FL (ref 6–12)
POTASSIUM BLD-SCNC: 4.3 MMOL/L (ref 3.5–5.3)
PROT SERPL-MCNC: 6.7 G/DL (ref 6.3–8.7)
RBC # BLD AUTO: 4.2 10*6/MM3 (ref 3.77–5.28)
SODIUM BLD-SCNC: 137 MMOL/L (ref 135–145)
TSH SERPL DL<=0.05 MIU/L-ACNC: 0.15 MIU/ML (ref 0.47–4.68)
WBC NRBC COR # BLD: 10.96 10*3/MM3 (ref 3.4–10.8)

## 2019-08-09 PROCEDURE — 94760 N-INVAS EAR/PLS OXIMETRY 1: CPT

## 2019-08-09 PROCEDURE — G0378 HOSPITAL OBSERVATION PER HR: HCPCS

## 2019-08-09 PROCEDURE — 84443 ASSAY THYROID STIM HORMONE: CPT | Performed by: HOSPITALIST

## 2019-08-09 PROCEDURE — 80053 COMPREHEN METABOLIC PANEL: CPT | Performed by: HOSPITALIST

## 2019-08-09 PROCEDURE — 94799 UNLISTED PULMONARY SVC/PX: CPT

## 2019-08-09 PROCEDURE — 85027 COMPLETE CBC AUTOMATED: CPT | Performed by: HOSPITALIST

## 2019-08-09 PROCEDURE — 36415 COLL VENOUS BLD VENIPUNCTURE: CPT | Performed by: HOSPITALIST

## 2019-08-09 PROCEDURE — 93005 ELECTROCARDIOGRAM TRACING: CPT | Performed by: HOSPITALIST

## 2019-08-09 PROCEDURE — 84439 ASSAY OF FREE THYROXINE: CPT | Performed by: HOSPITALIST

## 2019-08-09 PROCEDURE — 99284 EMERGENCY DEPT VISIT MOD MDM: CPT

## 2019-08-09 RX ORDER — QUETIAPINE FUMARATE 200 MG/1
200 TABLET, FILM COATED ORAL NIGHTLY
Status: DISCONTINUED | OUTPATIENT
Start: 2019-08-09 | End: 2019-08-10 | Stop reason: HOSPADM

## 2019-08-09 RX ORDER — ONDANSETRON 4 MG/1
4 TABLET, FILM COATED ORAL EVERY 6 HOURS PRN
Status: DISCONTINUED | OUTPATIENT
Start: 2019-08-09 | End: 2019-08-10 | Stop reason: HOSPADM

## 2019-08-09 RX ORDER — AMLODIPINE BESYLATE 10 MG/1
10 TABLET ORAL
Status: DISCONTINUED | OUTPATIENT
Start: 2019-08-09 | End: 2019-08-09

## 2019-08-09 RX ORDER — HYDRALAZINE HYDROCHLORIDE 20 MG/ML
10 INJECTION INTRAMUSCULAR; INTRAVENOUS EVERY 6 HOURS PRN
Status: DISCONTINUED | OUTPATIENT
Start: 2019-08-09 | End: 2019-08-10 | Stop reason: HOSPADM

## 2019-08-09 RX ORDER — SODIUM CHLORIDE 0.9 % (FLUSH) 0.9 %
3-10 SYRINGE (ML) INJECTION AS NEEDED
Status: DISCONTINUED | OUTPATIENT
Start: 2019-08-09 | End: 2019-08-10 | Stop reason: HOSPADM

## 2019-08-09 RX ORDER — ARIPIPRAZOLE 10 MG/1
10 TABLET ORAL DAILY
Status: DISCONTINUED | OUTPATIENT
Start: 2019-08-09 | End: 2019-08-10 | Stop reason: HOSPADM

## 2019-08-09 RX ORDER — HYDROCODONE BITARTRATE AND ACETAMINOPHEN 5; 325 MG/1; MG/1
1 TABLET ORAL EVERY 4 HOURS PRN
Status: DISCONTINUED | OUTPATIENT
Start: 2019-08-09 | End: 2019-08-10 | Stop reason: HOSPADM

## 2019-08-09 RX ORDER — METOPROLOL TARTRATE 5 MG/5ML
5 INJECTION INTRAVENOUS EVERY 6 HOURS PRN
Status: DISCONTINUED | OUTPATIENT
Start: 2019-08-09 | End: 2019-08-10 | Stop reason: HOSPADM

## 2019-08-09 RX ORDER — LISINOPRIL 10 MG/1
10 TABLET ORAL
Status: DISCONTINUED | OUTPATIENT
Start: 2019-08-09 | End: 2019-08-10 | Stop reason: HOSPADM

## 2019-08-09 RX ORDER — LISINOPRIL 5 MG/1
5 TABLET ORAL ONCE
Status: DISCONTINUED | OUTPATIENT
Start: 2019-08-09 | End: 2019-08-09

## 2019-08-09 RX ORDER — LISINOPRIL 5 MG/1
5 TABLET ORAL ONCE
Qty: 1 TABLET | Refills: 0 | Status: SHIPPED | OUTPATIENT
Start: 2019-08-09 | End: 2021-10-22

## 2019-08-09 RX ORDER — SODIUM CHLORIDE 0.9 % (FLUSH) 0.9 %
3 SYRINGE (ML) INJECTION EVERY 12 HOURS SCHEDULED
Status: DISCONTINUED | OUTPATIENT
Start: 2019-08-09 | End: 2019-08-10 | Stop reason: HOSPADM

## 2019-08-09 RX ADMIN — SODIUM CHLORIDE, PRESERVATIVE FREE 3 ML: 5 INJECTION INTRAVENOUS at 21:03

## 2019-08-09 RX ADMIN — LISINOPRIL 10 MG: 10 TABLET ORAL at 17:49

## 2019-08-09 RX ADMIN — HYDROCODONE BITARTRATE AND ACETAMINOPHEN 1 TABLET: 5; 325 TABLET ORAL at 21:08

## 2019-08-09 RX ADMIN — ARIPIPRAZOLE 10 MG: 10 TABLET ORAL at 17:49

## 2019-08-09 RX ADMIN — VERAPAMIL HYDROCHLORIDE 180 MG: 180 TABLET, FILM COATED, EXTENDED RELEASE ORAL at 17:49

## 2019-08-09 RX ADMIN — QUETIAPINE FUMARATE 200 MG: 200 TABLET ORAL at 21:03

## 2019-08-09 NOTE — CONSULTS
[facility] Medicine Consult  Consults    Date of Admission: 8/9/2019  Date of Consult: 08/09/19    Primary Care Physician: Sunny Palmer MD  Referring Physician: Dr. Austin Moore    Chief Complaint/Reason for Consultation: BP management    Subjective   History of Present Illness  Patient is a 38-year-old female who had plastic surgery today with Dr. Moore.  She had a rhinoplasty and upper lid blepharoplasty.  Her postoperative course was complicated by uncontrolled hypertension.  Patient has a known history of hypertension and normally takes verapamil and lisinopril at nights.  She states that this usually keeps her blood pressure normal.  Her last night was 117/81.  She states that today she felt stressed by the procedure and she was in pain and thinks that that may have bumped her blood pressure.  In any event she needed multiple doses of nitroglycerin, Nitropaste, beta-blocker and nifedipine.  She was subsequently sent to the emergency room and admitted for overnight observation.  In the office, her blood pressure was estimated to be as high as 190/110 and in the emergency room it was 145/88.    Review of Systems   Constitutional: No fevers, no chills, no fatigue  Eye: No discharge, no visual disturbances, no recent visual problems  Ear/nose/throat: No decreased hearing, no vertigo, no dysphagia, no sore throat  Respiratory: No shortness of breath, no cough, no sputum production  Cardiovascular: No chest pain, no palpitations, no syncope, no peripheral edema  Gastrointestinal: No abdominal pain, no nausea, no vomiting, no diarrhea, no constipation  Urinary: No dysuria, no hematuria, no change in urinary stream  Hematology/lymphatics: No bruising tendency, no bleeding tendency, no swollen lymph node  Endocrine: No excessive thirst, no polyuria, no excessive hunger  Immunologic: No recurrent fevers, no recurrent infections, not immunocompromised  Musculoskeletal: No  trauma, no back pain, normal ROM   Integumentary: No rash, pruritus, no skin breakdown  Neurologic: Alert and oriented x4, no confusion, no numbness, no tingling, no abnormal balance  Psychiatric: No suicidal ideation    Past Medical History:   Past Medical History:   Diagnosis Date   • Allergic    • Bipolar disorder (CMS/HCC)    • Depression    • H/O schizophrenia    • Hypertension        Past Surgical History: States that she has no history of surgeries  Social History: Patient is a lifelong non-smoker.  She uses no alcohol or street drugs.  Family History: Mother is alive and healthy.  Father  of unknown causes.  Allergies: No known drug allergies    Medications: Scheduled Meds:  ARIPiprazole 10 mg Oral Daily   lisinopril 10 mg Oral Q24H   QUEtiapine 200 mg Oral Nightly   sodium chloride 3 mL Intravenous Q12H   verapamil  mg Oral Q24H     Continuous Infusions:   PRN Meds:.hydrALAZINE  •  HYDROcodone-acetaminophen  •  metoprolol tartrate  •  ondansetron  •  sodium chloride    Objective     Physical Exam:   Temp:  [97.8 °F (36.6 °C)-98.4 °F (36.9 °C)] 97.8 °F (36.6 °C)  Heart Rate:  [52-59] 57  Resp:  [10-18] 18  BP: (117-154)/(76-88) 143/78    Constitutional: Patient is oriented to person, place, and time and well-developed, well-nourished, and in no distress.   Head: Normocephalic and atraumatic.   Eyes: Conjunctivae and EOM are normal. Pupils are equal, round, and reactive to light.   Neck: Normal range of motion. Neck supple. No JVD present. No tracheal deviation present. No thyromegaly present.   Cardiovascular: Normal rate, regular rhythm and normal heart sounds.   Pulmonary/Chest: Effort normal and breath sounds normal. No stridor. No respiratory distress. No wheezes or crackles. Patient exhibits no chest tenderness.   Abdominal: Soft. Bowel sounds are normal. Patient exhibits no distension and no mass. There is no tenderness. There is no rebound and no guarding.   Musculoskeletal: Normal range of  motion. Patient exhibits no edema or deformity.   Neurological: Patient is neurological grossly intact.   Skin: Skin is warm and dry. No rash noted. No erythema.   Psychiatric: Mood and affect normal.     Results Reviewed:  I have personally reviewed current lab, radiology and data.    Lab Results (last 24 hours)     Procedure Component Value Units Date/Time    Comprehensive Metabolic Panel [[id number]] Collected:  08/09/19 1657    Specimen:  Blood Updated:  08/09/19 1700    CBC (No Diff) [[id number]] Collected:  08/09/19 1657    Specimen:  Blood Updated:  08/09/19 1700    TSH [[id number]] Collected:  08/09/19 1657    Specimen:  Blood Updated:  08/09/19 1700          Imaging Results (last 24 hours)     ** No results found for the last 24 hours. **          Assessment / Plan     Active Hospital Problems    Diagnosis   • Post-operative state      Hypertension  Uncontrolled postoperatively.  Patient thinks it is because of pain and the stress of the procedure.  Since she is normally controlled with verapamil 180 mg daily and lisinopril 10 mg daily, will resume this and also placed on as needed IV hydralazine and Lopressor.  Twelve-lead EKG is pending.  TSH is pending.    Psychiatric issues  Patient has a history of major depression, bipolar disorder and schizophrenia.  Will resume her Seroquel and Abilify     Postoperative state  Patient has diffuse facial edema and nasal packs and splints and will defer to Dr. Moore management    DVT precautions  Bilateral SCDs to lower extremities    Tests pending  EKG and admission labs including TSH    I discussed the patient's findings and my recommendations with patient.     Gabbi Faulkner MD  08/09/19  5:01 PM    TIME SPENT: 50  minutes

## 2019-08-09 NOTE — H&P
"    Danelle Estrada  is a 38 y.o.  female. Patients  1980    Chief Complaint   Patient presents with   • Hypertension       HPI: The patient is a 38-year-old black female who underwent a rhinoplasty and upper lid blepharoplasty in my office operating room with no intraoperative complications or problems.  Postoperatively, however, the patient had elevated blood pressures which required multiple medications including nitroglycerin, Nitropaste, beta blockers, and nifedipine.  These medications were helpful in keeping her blood pressure from getting excessively high however, they required multiple repeat dosages.  It was therefore decided the patient should be admitted overnight for observation and blood pressure control rather than discharged.      Past Medical History:   Diagnosis Date   • Allergic    • Bipolar disorder (CMS/HCC)    • Depression    • H/O schizophrenia    • Hypertension      Allergies:  Patient has no known allergies.    Social History     Tobacco Use   • Smoking status: Unknown If Ever Smoked   • Smokeless tobacco: Never Used   Substance Use Topics   • Alcohol use: No   • Drug use: Not on file     Comment: uknown       (Not in a hospital admission)  History reviewed. No pertinent surgical history.                Review of Systems : No additional pertinent clinical information gleaned.     Physical Examination     Height:   157.5 cm (62\")          19  1511   Weight: 65.8 kg (145 lb)       Temp:  [98.4 °F (36.9 °C)] 98.4 °F (36.9 °C)  Heart Rate:  [57] 57  Resp:  [10] 10  BP: (138)/(85) 138/85    HEENT: The patient has diffuse facial edema following postsurgical changes there are nasal packs and a nasal splint in place and sutures along the upper eyelid.    NECK: supple    LUNGS: CTA     HEART: RRR    ABDOMEN: Soft, non-tender, NABS, No HSM    Extremities: No cyanosis, clubbing, or edema     SKIN: Warm and Dry    ADDITIONAL FINDINGS:         I discussed the patients findings and my " recommendations with patient.    Austin Moore MD  08/09/19  3:30 PM

## 2019-08-09 NOTE — ED PROVIDER NOTES
Subjective   This patient had a rhinoplasty done in the office of Dr. Moore, after surgery her blood pressure shot up and he has several maneuvers to try to get her blood pressure under improved control and giving beta including giving a beta-blocker giving Procardia and also said he was given her some sublingual nitro.  He was concerned about the fact that this control was not adequate and wants to now admit the patient to the hospital.  The patient is a somnolent but is arousable after having surgery and she does answer questions appropriately.            Review of Systems   Constitutional: Negative.    Respiratory: Negative.    Cardiovascular: Negative.    Gastrointestinal: Negative.    Musculoskeletal: Negative.    Neurological: Negative.        Past Medical History:   Diagnosis Date   • Allergic    • Bipolar disorder (CMS/HCC)    • Depression    • H/O schizophrenia    • Hypertension        No Known Allergies    History reviewed. No pertinent surgical history.    History reviewed. No pertinent family history.    Social History     Socioeconomic History   • Marital status: Single     Spouse name: Not on file   • Number of children: Not on file   • Years of education: Not on file   • Highest education level: Not on file   Tobacco Use   • Smoking status: Unknown If Ever Smoked   • Smokeless tobacco: Never Used   Substance and Sexual Activity   • Alcohol use: No           Objective   Physical Exam   Constitutional: She is oriented to person, place, and time. She appears well-developed and well-nourished.   HENT:   This patient is a recent postop patient and has edema and swelling of the mid face and around the nose she has tubes in each nostril which were placed by the plastic surgeon.  Her voice is normal no sign of any airway problems.   Cardiovascular: Normal rate and regular rhythm.   Pulmonary/Chest: Effort normal and breath sounds normal.   Abdominal: Soft. Bowel sounds are normal.   Musculoskeletal: Normal  range of motion.   Neurological: She is alert and oriented to person, place, and time.   Nursing note and vitals reviewed.      Procedures           ED Course                  MDM  Number of Diagnoses or Management Options  Diagnosis management comments: Dr Moore is present in the ED and is willing to admit to his service.  I spoke to Dr Faulkner, the Hospitalist who is willing to see the patient and manage her hypertension.          Final diagnoses:   Post-operative state   Essential hypertension            Adam Yancey,   08/09/19 1542

## 2019-08-09 NOTE — PLAN OF CARE
Problem: Patient Care Overview  Goal: Plan of Care Review  Outcome: Ongoing (interventions implemented as appropriate)   08/09/19 1810   Coping/Psychosocial   Plan of Care Reviewed With patient   Plan of Care Review   Progress no change   OTHER   Outcome Summary patient received from ER with elevated blood pressure. patient had rhinoplasty bilateral and upper eye lid blepharoplasty in Dr. Moore office and he sent her to ER. patient with tiny sutures to eyelids and bilateral nostrils with packing. eyes and nares oozing blood. patient resting in bed and sleeping between care. no complaints of pain. monitor blood pressure and pain     Goal: Individualization and Mutuality  Outcome: Ongoing (interventions implemented as appropriate)    Goal: Discharge Needs Assessment  Outcome: Ongoing (interventions implemented as appropriate)      Problem: Hypertensive Disease/Crisis (Arterial) (Adult)  Goal: Signs and Symptoms of Listed Potential Problems Will be Absent, Minimized or Managed (Hypertensive Disease/Crisis)  Outcome: Ongoing (interventions implemented as appropriate)

## 2019-08-10 VITALS
HEIGHT: 62 IN | OXYGEN SATURATION: 99 % | DIASTOLIC BLOOD PRESSURE: 85 MMHG | RESPIRATION RATE: 16 BRPM | BODY MASS INDEX: 26.68 KG/M2 | TEMPERATURE: 98.8 F | SYSTOLIC BLOOD PRESSURE: 136 MMHG | HEART RATE: 100 BPM | WEIGHT: 145 LBS

## 2019-08-10 LAB — T4 FREE SERPL-MCNC: 0.98 NG/DL (ref 0.78–2.19)

## 2019-08-10 PROCEDURE — 94799 UNLISTED PULMONARY SVC/PX: CPT

## 2019-08-10 PROCEDURE — G0378 HOSPITAL OBSERVATION PER HR: HCPCS

## 2019-08-10 PROCEDURE — 93010 ELECTROCARDIOGRAM REPORT: CPT | Performed by: INTERNAL MEDICINE

## 2019-08-10 RX ADMIN — HYDROCODONE BITARTRATE AND ACETAMINOPHEN 1 TABLET: 5; 325 TABLET ORAL at 04:03

## 2019-08-10 RX ADMIN — ARIPIPRAZOLE 10 MG: 10 TABLET ORAL at 08:24

## 2019-08-10 RX ADMIN — LISINOPRIL 10 MG: 10 TABLET ORAL at 08:24

## 2019-08-10 RX ADMIN — VERAPAMIL HYDROCHLORIDE 180 MG: 180 TABLET, FILM COATED, EXTENDED RELEASE ORAL at 08:24

## 2019-08-10 NOTE — PROGRESS NOTES
Called about patient being discharged by primary, however patient left before I saw her.  Her blood pressure was under control.  Her EKG was normal.  Her TSH was decreased but her free T4 was normal.  Patient was advised to follow-up with her primary care provider about her thyroid dysfunction.

## 2019-08-10 NOTE — PLAN OF CARE
Problem: Patient Care Overview  Goal: Plan of Care Review  Outcome: Ongoing (interventions implemented as appropriate)   08/10/19 0338   Coping/Psychosocial   Plan of Care Reviewed With patient   Plan of Care Review   Progress no change   OTHER   Outcome Summary Pt having some c/o pain to incisional face sites; prn meds given with relief. Forehead incisions with some sm amount of drainage; nose with some bloody drainage throughout the shift. IV in place IID. BP WDL throughout this shift. Up ad lucila. Voiding. VSS. Will cont to monitor.      Goal: Discharge Needs Assessment  Outcome: Ongoing (interventions implemented as appropriate)   08/09/19 1715 08/09/19 1716 08/09/19 1810   Discharge Needs Assessment   Readmission Within the Last 30 Days --  --  no previous admission in last 30 days   Concerns to be Addressed --  --  no discharge needs identified   Patient/Family Anticipates Transition to --  home --    Patient/Family Anticipated Services at Transition --  none --    Transportation Anticipated --  family or friend will provide --    Anticipated Changes Related to Illness --  --  none   Equipment Needed After Discharge --  --  none   Offered/Gave Vendor List --  --  no   Disability   Equipment Currently Used at Home none --  --      Goal: Interprofessional Rounds/Family Conf  Outcome: Ongoing (interventions implemented as appropriate)   08/10/19 0338   Interdisciplinary Rounds/Family Conf   Participants nursing;patient       Problem: Hypertensive Disease/Crisis (Arterial) (Adult)  Goal: Signs and Symptoms of Listed Potential Problems Will be Absent, Minimized or Managed (Hypertensive Disease/Crisis)  Outcome: Ongoing (interventions implemented as appropriate)   08/10/19 0338   Goal/Outcome Evaluation   Problems Assessed (Hypertensive Disease/Crisis (Arterial)) all   Problems Present (Hypertensive Disease) none       Problem: Pain, Acute (Adult)  Goal: Identify Related Risk Factors and Signs and Symptoms  Outcome:  Ongoing (interventions implemented as appropriate)   08/10/19 0338   Pain, Acute (Adult)   Related Risk Factors (Acute Pain) surgery   Signs and Symptoms (Acute Pain) verbalization of pain descriptors     Goal: Acceptable Pain Control/Comfort Level  Outcome: Ongoing (interventions implemented as appropriate)   08/10/19 0338   Pain, Acute (Adult)   Acceptable Pain Control/Comfort Level making progress toward outcome

## 2019-08-10 NOTE — DISCHARGE SUMMARY
This patient is a 38-year-old black female who underwent rhinoplasty and eyelid surgery yesterday and postoperatively had uncontrolled hypertension.  It was therefore decided the patient should be it admitted for overnight observation and blood pressure management.    Physical examination at the time of admission revealed a well-developed well-nourished black female in no acute distress despite postoperative surgery changes.  The patient had diffuse facial edema and ecchymosis a nasal splint was applied was present and the patient had nasal packing in place.  Her blood pressure at the time of presentation to the emergency room in order to be registered for admission was 148/84.  The remainder of the physical examination was unremarkable.    Hospital course: The patient was admitted for overnight observation with an IV in place EKG and lab work were normal and the patient required no further medical intervention for her hypertension overnight.  It was therefore decided the patient could be safely discharged home on her routine antihypertensive medications.    Disposition: Patient is being discharged home.  She is instructed to follow-up with me in 2 days for removal of nasal packing and sutures from the eyelids she has a prescription for Norco 5 mg she is to leave the nasal splint in position and resume all of her preoperative medications.    Complications this admission: Postoperative hypertension    Consultations: Hospitalist for hypertension management    Attending physician: Oscar

## 2019-08-17 ENCOUNTER — OFFICE VISIT (OUTPATIENT)
Dept: RETAIL CLINIC | Facility: CLINIC | Age: 39
End: 2019-08-17

## 2019-08-17 DIAGNOSIS — Z53.21 PATIENT LEFT WITHOUT BEING SEEN: Primary | ICD-10-CM

## 2019-08-17 NOTE — PROGRESS NOTES
Discovered she had a refill available for erythromycin ophthalmic ointment and elected to leave without being seen.    Evie Causey APRN

## 2019-09-20 ENCOUNTER — OFFICE VISIT (OUTPATIENT)
Dept: OBSTETRICS AND GYNECOLOGY | Facility: CLINIC | Age: 39
End: 2019-09-20

## 2019-09-20 VITALS
HEIGHT: 62 IN | SYSTOLIC BLOOD PRESSURE: 128 MMHG | DIASTOLIC BLOOD PRESSURE: 76 MMHG | WEIGHT: 138 LBS | BODY MASS INDEX: 25.4 KG/M2

## 2019-09-20 DIAGNOSIS — Z12.31 SCREENING MAMMOGRAM, ENCOUNTER FOR: ICD-10-CM

## 2019-09-20 DIAGNOSIS — Z30.41 ENCOUNTER FOR SURVEILLANCE OF CONTRACEPTIVE PILLS: Primary | ICD-10-CM

## 2019-09-20 PROCEDURE — 99203 OFFICE O/P NEW LOW 30 MIN: CPT | Performed by: OBSTETRICS & GYNECOLOGY

## 2019-09-20 RX ORDER — LISINOPRIL 5 MG/1
5 TABLET ORAL DAILY
Refills: 1 | COMMUNITY
Start: 2019-08-30 | End: 2021-10-22 | Stop reason: SDUPTHER

## 2019-09-20 RX ORDER — NORGESTIMATE AND ETHINYL ESTRADIOL 0.25-0.035
1 KIT ORAL DAILY
Qty: 28 TABLET | Refills: 3 | Status: SHIPPED | OUTPATIENT
Start: 2019-09-20 | End: 2019-12-10 | Stop reason: SDUPTHER

## 2019-09-20 RX ORDER — ARIPIPRAZOLE 10 MG/1
TABLET ORAL
COMMUNITY
Start: 2019-09-09 | End: 2020-03-23

## 2019-09-20 RX ORDER — ACYCLOVIR 400 MG/1
TABLET ORAL
Refills: 1 | COMMUNITY
Start: 2019-08-30 | End: 2019-09-25 | Stop reason: SDUPTHER

## 2019-09-20 RX ORDER — QUETIAPINE FUMARATE 200 MG/1
200 TABLET, FILM COATED ORAL NIGHTLY
COMMUNITY
Start: 2019-09-09

## 2019-09-20 NOTE — PROGRESS NOTES
Subjective   Danelle Estrada is a 39 y.o. female  YOB: 1980        Chief Complaint   Patient presents with   • Contraception     Pt is here for contraception  Pt was being seen at the  and they no longer take her insurance.  PT needs refill on OCP  Last pap was 10/18 that was normal        39 year old female  LMP 2019 presents for contraceptive management. Patient reports that she was previously seen and evaluated by the health department and received her annual examination as well as PAP smear there. She reports that her cycles are regular lasting for four days. She denies any smoking at this time. Reports that she is not currently sexually active. Last mammogram was in  and was normal as per patient. Her last PAP smear was in  and was negative for intraepithelial lesion.       Contraception         No Known Allergies    Past Medical History:   Diagnosis Date   • Allergic    • Bipolar disorder (CMS/HCC)    • Depression    • H/O schizophrenia    • Hypertension        Family History   Problem Relation Age of Onset   • No Known Problems Father    • No Known Problems Mother    • No Known Problems Brother    • No Known Problems Sister        Social History     Socioeconomic History   • Marital status: Single     Spouse name: Not on file   • Number of children: Not on file   • Years of education: Not on file   • Highest education level: Not on file   Tobacco Use   • Smoking status: Never Smoker   • Smokeless tobacco: Never Used   Substance and Sexual Activity   • Alcohol use: No   • Drug use: No     Comment: uknown   • Sexual activity: Yes     Partners: Male     Birth control/protection: OCP         Current Outpatient Medications:   •  acyclovir (ZOVIRAX) 400 MG tablet, TK 1 T PO TID PRN FOR FEVER BLISTERS, Disp: , Rfl: 1  •  ARIPiprazole (ABILIFY) 10 MG tablet, , Disp: , Rfl:   •  lisinopril (PRINIVIL,ZESTRIL) 5 MG tablet, Take  by mouth Daily., Disp: , Rfl: 1  •  Multiple  "Vitamins-Minerals (MULTIVITAMIN ADULTS PO), Take  by mouth., Disp: , Rfl:   •  QUEtiapine (SEROquel) 200 MG tablet, , Disp: , Rfl:   •  VERAPAMIL HCL ER PO, Take  by mouth., Disp: , Rfl:   •  erythromycin (ROMYCIN) 5 MG/GM ophthalmic ointment, Apply to suture line three times daily as directed, Disp: 3.5 g, Rfl: 1  •  HYDROcodone-acetaminophen (NORCO) 7.5-325 MG per tablet, Take 1 tablet by mouth every 4 hours as needed for pain, Disp: 40 tablet, Rfl: 0  •  NON FORMULARY, Birth control, Disp: , Rfl:   •  norgestimate-ethinyl estradiol (SPRINTEC 28) 0.25-35 MG-MCG per tablet, Take 1 tablet by mouth Daily., Disp: 28 tablet, Rfl: 3    Patient's last menstrual period was 08/30/2019 (exact date).    Sexual History:         Could not be calculated    Past Surgical History:   Procedure Laterality Date   • EYE SURGERY     • RHINOPLASTY     • WISDOM TOOTH EXTRACTION         Review of Systems   Genitourinary: Negative for amenorrhea, menstrual problem, vaginal bleeding and vaginal discharge.       Objective   Physical Exam   Constitutional: She is oriented to person, place, and time. She appears well-developed and well-nourished. No distress.   HENT:   Head: Normocephalic and atraumatic.   Neck: Normal range of motion.   Cardiovascular: Normal rate and regular rhythm.   No murmur heard.  Pulmonary/Chest: Effort normal and breath sounds normal. She has no wheezes.   Abdominal: Soft. She exhibits no distension. There is no tenderness.   Musculoskeletal: Normal range of motion.   Neurological: She is alert and oriented to person, place, and time.   Skin: Skin is warm and dry.   Psychiatric: She has a normal mood and affect. Her behavior is normal. Judgment normal.   Nursing note and vitals reviewed.        Vitals:    09/20/19 1007 09/20/19 1032   BP: 132/82 128/76   Weight: 62.6 kg (138 lb)    Height: 157.5 cm (62\")        Danelle was seen today for contraception.    Diagnoses and all orders for this visit:    Encounter for " surveillance of contraceptive pills    Screening mammogram, encounter for  -     Mammo Screening Digital Tomosynthesis Bilateral With CAD; Future    Other orders  -     norgestimate-ethinyl estradiol (SPRINTEC 28) 0.25-35 MG-MCG per tablet; Take 1 tablet by mouth Daily.    -Script for OCP sent at this time. Risk, benefits discussed.   -Order for mammogram placed.   -RTC for annual examination.     Bre Jordan,

## 2019-09-25 ENCOUNTER — OFFICE VISIT (OUTPATIENT)
Dept: RETAIL CLINIC | Facility: CLINIC | Age: 39
End: 2019-09-25

## 2019-09-25 VITALS
RESPIRATION RATE: 16 BRPM | OXYGEN SATURATION: 98 % | DIASTOLIC BLOOD PRESSURE: 85 MMHG | TEMPERATURE: 97.6 F | SYSTOLIC BLOOD PRESSURE: 129 MMHG | HEART RATE: 98 BPM

## 2019-09-25 DIAGNOSIS — B00.1 RECURRENT COLD SORES: Primary | ICD-10-CM

## 2019-09-25 PROCEDURE — 99213 OFFICE O/P EST LOW 20 MIN: CPT | Performed by: NURSE PRACTITIONER

## 2019-09-25 RX ORDER — DOCOSANOL 100 MG/G
CREAM TOPICAL
Qty: 2 G | Refills: 3 | Status: SHIPPED | OUTPATIENT
Start: 2019-09-25 | End: 2019-10-02

## 2019-09-25 RX ORDER — DIPHENHYDRAMINE HCL 25 MG
25 CAPSULE ORAL EVERY 6 HOURS PRN
COMMUNITY

## 2019-09-25 RX ORDER — ACYCLOVIR 400 MG/1
800 TABLET ORAL 3 TIMES DAILY
Qty: 30 TABLET | Refills: 2 | Status: SHIPPED | OUTPATIENT
Start: 2019-09-25 | End: 2022-02-24 | Stop reason: SDUPTHER

## 2019-09-25 NOTE — PROGRESS NOTES
Chief Complaint   Patient presents with   • Blister     Subjective   Danelle Estrada is a 39 y.o. female who presents to the clinic today with complaints a fever blister. Her primary care provider usually gives her acyclovir, but she feels the topical antivirals are better .She has mild allergy or cold symptoms and started to develop symptoms 2 days ago. She only had one acyclovir and took it yesterday. She usually gets these on her lips, but this one is just below and bigger.   Blister   This is a new problem. The current episode started in the past 7 days. The problem occurs constantly. The problem has been gradually worsening. Associated symptoms include congestion. Pertinent negatives include no abdominal pain, anorexia, arthralgias, change in bowel habit, chest pain, chills, coughing, diaphoresis, fatigue, fever, headaches, joint swelling, myalgias, nausea, neck pain, numbness, rash, sore throat, swollen glands, urinary symptoms, vertigo, visual change, vomiting or weakness. Nothing aggravates the symptoms. Treatments tried: acyclovir yesterday x 1.  The treatment provided no relief.         Current Outpatient Medications:   •  ARIPiprazole (ABILIFY) 10 MG tablet, , Disp: , Rfl:   •  diphenhydrAMINE (BENADRYL) 25 mg capsule, Take 25 mg by mouth Every 6 (Six) Hours As Needed for Itching., Disp: , Rfl:   •  lisinopril (PRINIVIL,ZESTRIL) 5 MG tablet, Take  by mouth Daily., Disp: , Rfl: 1  •  Multiple Vitamins-Minerals (MULTIVITAMIN ADULTS PO), Take  by mouth., Disp: , Rfl:   •  norgestimate-ethinyl estradiol (SPRINTEC 28) 0.25-35 MG-MCG per tablet, Take 1 tablet by mouth Daily., Disp: 28 tablet, Rfl: 3  •  QUEtiapine (SEROquel) 200 MG tablet, , Disp: , Rfl:   •  VERAPAMIL HCL ER PO, Take  by mouth., Disp: , Rfl:   •  acyclovir (ZOVIRAX) 400 MG tablet, Take 2 tablets by mouth 3 (Three) Times a Day. Take no more than 5 doses a day., Disp: 30 tablet, Rfl: 2  •  docosanol (ABREVA) 10 % cream cream, Apply  topically  to the appropriate area as directed 5 (Five) Times a Day for 7 days., Disp: 2 g, Rfl: 3  •  erythromycin (ROMYCIN) 5 MG/GM ophthalmic ointment, Apply to suture line three times daily as directed, Disp: 3.5 g, Rfl: 1  •  HYDROcodone-acetaminophen (NORCO) 7.5-325 MG per tablet, Take 1 tablet by mouth every 4 hours as needed for pain, Disp: 40 tablet, Rfl: 0  •  NON FORMULARY, Birth control, Disp: , Rfl:     Allergies:  Patient has no known allergies.    Past Medical History:   Diagnosis Date   • Allergic    • Bipolar disorder (CMS/HCC)    • Depression    • H/O schizophrenia    • Hypertension      Past Surgical History:   Procedure Laterality Date   • EYE SURGERY     • RHINOPLASTY     • WISDOM TOOTH EXTRACTION       Family History   Problem Relation Age of Onset   • No Known Problems Father    • No Known Problems Mother    • No Known Problems Brother    • No Known Problems Sister      Social History     Tobacco Use   • Smoking status: Never Smoker   • Smokeless tobacco: Never Used   Substance Use Topics   • Alcohol use: No   • Drug use: No     Comment: uknown       Review of Systems  Review of Systems   Constitutional: Negative for chills, diaphoresis, fatigue and fever.   HENT: Positive for congestion. Negative for sore throat.    Respiratory: Negative for cough.    Cardiovascular: Negative for chest pain.   Gastrointestinal: Negative for abdominal pain, anorexia, change in bowel habit, nausea and vomiting.   Musculoskeletal: Negative for arthralgias, joint swelling, myalgias and neck pain.   Skin: Negative for rash.   Neurological: Negative for vertigo, weakness, numbness and headaches.       Objective   /85 (BP Location: Left arm, Patient Position: Sitting, Cuff Size: Adult)   Pulse 98   Temp 97.6 °F (36.4 °C) (Tympanic)   Resp 16   LMP 08/30/2019 (Exact Date)   SpO2 98%       Physical Exam    Assessment/Plan     Danelle was seen today for blister.    Diagnoses and all orders for this visit:    Recurrent  cold sores    Other orders  -     docosanol (ABREVA) 10 % cream cream; Apply  topically to the appropriate area as directed 5 (Five) Times a Day for 7 days.  -     acyclovir (ZOVIRAX) 400 MG tablet; Take 2 tablets by mouth 3 (Three) Times a Day. Take no more than 5 doses a day.      Medications work best if you take them at first indication of break out such as pain or itching.

## 2019-09-25 NOTE — PATIENT INSTRUCTIONS
Medications work best if you take them at first indication of break out such as pain or itching.         Cold Sore    A cold sore, also called a fever blister, is a skin infection that causes small, fluid-filled sores to form inside of the mouth or on the lips, gums, nose, chin, or cheeks. Cold sores can spread to other parts of the body, such as the eyes or fingers. In some people with other medical conditions, cold sores can spread to multiple other body sites, including the genitals. Cold sores can be spread or passed from person to person (contagious) until the sores crust over completely.  What are the causes?  Cold sores are caused by the herpes simplex virus (HSV-1). HSV-1 is closely related to the virus that causes genital herpes (HSV-2), but these viruses are not the same. Once a person is infected with HSV-1, the virus remains permanently in the body.  HSV-1 is spread from person to person through close contact, such as through kissing, touching the affected area, or sharing personal items such as lip balm, razors, or eating utensils.  What increases the risk?  A cold sore outbreak is more likely to develop in people who:  · Are tired, stressed, or sick.  · Are menstruating.  · Are pregnant.  · Take certain medicines.  · Are exposed to cold weather or too much sun.  What are the signs or symptoms?  Symptoms of a cold sore outbreak often go through different stages. Here is how a cold sore develops:  · Tingling, itching, or burning is felt 1-2 days before the outbreak.  · Fluid-filled blisters appear on the lips, inside the mouth, on the nose, or on the cheeks.  · The blisters start to ooze clear fluid.  · The blisters dry up and a yellow crust appears in its place.  · The crust falls off.  Other symptoms include:  · Fever.  · Sore throat.  · Headache.  · Muscle aches.  · Swollen neck glands.  You also may not have any symptoms.  How is this diagnosed?  This condition is often diagnosed based on your  medical history and a physical exam. Your health care provider may swab your sore and then examine it in the lab. Rarely, blood tests may be done to check for HSV-1.  How is this treated?  There is no cure for cold sores or HSV-1. There also is no vaccine for HSV-1. Most cold sores go away on their own without treatment within two weeks. Medicines cannot make the infection go away, but medicines can:  · Help relieve some of the pain associated with the sores.  · Work to stop the virus from multiplying.  · Shorten healing time.  Medicines may be in the form of creams, gels, pills, or a shot.  Follow these instructions at home:  Medicines  · Take or apply over-the-counter and prescription medicines only as told by your health care provider.  · Use a cotton-tip swab to apply creams or gels to your sores.  Sore Care  · Do not touch the sores or pick the scabs.  · Wash your hands often. Do not touch your eyes without washing your hands first.  · Keep the sores clean and dry.  · If directed, apply ice to the sores:  ? Put ice in a plastic bag.  ? Place a towel between your skin and the bag.  ? Leave the ice on for 20 minutes, 2-3 times per day.  Lifestyle  · Do not kiss, have oral sex, or share personal items until your sores heal.  · Eat a soft, bland diet. Avoid eating hot, cold, or salty foods. These can hurt your mouth.  · Use a straw if it hurts to drink out of a glass.  · Avoid the sun and limit your stress if these things trigger outbreaks. If sun causes cold sores, apply sunscreen on your lips before being out in the sun.  Contact a health care provider if:  · You have symptoms for more than two weeks.  · You have pus coming from the sores.  · You have redness that is spreading.  · You have pain or irritation in your eye.  · You get sores on your genitals.  · Your sores do not heal within two weeks.  · You have frequent cold sore outbreaks.  Get help right away if:  · You have a fever and your symptoms suddenly  get worse.  · You have a headache and confusion.  This information is not intended to replace advice given to you by your health care provider. Make sure you discuss any questions you have with your health care provider.  Document Released: 12/15/2001 Document Revised: 08/11/2017 Document Reviewed: 10/07/2016  [employer] Interactive Patient Education © 2019 [employer]

## 2019-10-15 ENCOUNTER — TRANSCRIBE ORDERS (OUTPATIENT)
Dept: ADMINISTRATIVE | Facility: HOSPITAL | Age: 39
End: 2019-10-15

## 2019-10-15 ENCOUNTER — APPOINTMENT (OUTPATIENT)
Dept: LAB | Facility: HOSPITAL | Age: 39
End: 2019-10-15

## 2019-10-15 DIAGNOSIS — E03.9 HYPOTHYROIDISM, UNSPECIFIED TYPE: Primary | ICD-10-CM

## 2019-10-15 LAB
T4 FREE SERPL-MCNC: 1.04 NG/DL (ref 0.93–1.7)
TSH SERPL DL<=0.05 MIU/L-ACNC: 0.42 UIU/ML (ref 0.27–4.2)

## 2019-10-15 PROCEDURE — 84439 ASSAY OF FREE THYROXINE: CPT | Performed by: FAMILY MEDICINE

## 2019-10-15 PROCEDURE — 84443 ASSAY THYROID STIM HORMONE: CPT | Performed by: FAMILY MEDICINE

## 2019-10-15 PROCEDURE — 36415 COLL VENOUS BLD VENIPUNCTURE: CPT | Performed by: FAMILY MEDICINE

## 2019-10-24 ENCOUNTER — NURSE TRIAGE (OUTPATIENT)
Dept: CALL CENTER | Facility: HOSPITAL | Age: 39
End: 2019-10-24

## 2019-10-24 NOTE — TELEPHONE ENCOUNTER
"Caller states she received lab results today and requesting verification of the values with the reference ranges provided    Reason for Disposition  • [1] Other NON-URGENT information for PCP AND [2] does not require PCP response    Additional Information  • Negative: [1] Caller is not with the adult (patient) AND [2] reporting urgent symptoms  • Negative: Medication questions  • Lab result questions  • Negative: Lab calling with strep throat test results and triager can call in prescription  • Negative: Lab calling with urinalysis test results and triager can call in prescription  • Negative: Medication questions  • Negative: ED call to PCP  • Negative: Physician call to PCP  • Negative: Call about patient who is currently hospitalized  • Negative: Lab or radiology calling with CRITICAL test results  • Negative: [1] Prescription not at pharmacy AND [2] was prescribed today by PCP  • Negative: [1] Follow-up call from patient regarding patient's clinical status AND [2] information urgent  • Negative: [1] Caller requests to speak ONLY to PCP AND [2] URGENT question  • Negative: [1] Caller requests to speak to PCP now AND [2] won't tell us reason for call  (Exception: if 10 pm to 6 am, caller must first discuss reason for the call)  • Negative: Notification of hospital admission  • Negative: Notification of death  • Negative: Caller requesting lab results  • Negative: Lab or radiology calling with test results  • Negative: [1] Follow-up call from patient regarding patient's clinical status AND [2] information NON-URGENT  • Negative: [1] Caller requests to speak ONLY to PCP AND [2] NON-URGENT question  • Negative: Caller requesting an appointment, triage offered and declined    Answer Assessment - Initial Assessment Questions  1. REASON FOR CALL or QUESTION: \"What is your reason for calling today?\" or \"How can I best help you?\" or \"What question do you have that I can help answer?\"      Calling to verify her lab results " "as being normal.  States she got the results today in the mail and was wanting to make sure she was interpreting her results correctly.   Caller listed tests, results and normal reference ranges and verified that these were normal.    Answer Assessment - Initial Assessment Questions  1. REASON FOR CALL or QUESTION: \"What is your reason for calling today?\" or \"How can I best  help you?\" or \"What question do you have that I can help answer?\"      Verifying lab results received in the mail  2. CALLER: Document the source of call. (e.g., laboratory, patient).      Self    Protocols used: PCP CALL - NO TRIAGE-ADULT-AH, INFORMATION ONLY CALL-ADULT-AH      "

## 2019-10-26 ENCOUNTER — NURSE TRIAGE (OUTPATIENT)
Dept: CALL CENTER | Facility: HOSPITAL | Age: 39
End: 2019-10-26

## 2019-10-26 NOTE — TELEPHONE ENCOUNTER
"    Reason for Disposition  • General information question, no triage required and triager able to answer question    Additional Information  • Negative: [1] Caller is not with the adult (patient) AND [2] reporting urgent symptoms  • Negative: Lab result questions  • Negative: Medication questions  • Negative: Caller cannot be reached by phone  • Negative: Caller has already spoken to PCP or another triager  • Negative: RN needs further essential information from caller in order to complete triage  • Negative: Requesting regular office appointment  • Negative: [1] Caller requesting NON-URGENT health information AND [2] PCP's office is the best resource  • Negative: Health Information question, no triage required and triager able to answer question    Answer Assessment - Initial Assessment Questions  1. REASON FOR CALL or QUESTION: \"What is your reason for calling today?\" or \"How can I best help you?\" or \"What question do you have that I can help answer?\"      Caller asking if the same test is used to test for high or low thyroid.   Question answered.    Protocols used: INFORMATION ONLY CALL-ADULT-      "

## 2019-11-08 LAB — CONV PREAUTHORIZATION: NORMAL

## 2019-12-04 DIAGNOSIS — Z12.31 SCREENING MAMMOGRAM, ENCOUNTER FOR: ICD-10-CM

## 2019-12-10 ENCOUNTER — OFFICE VISIT (OUTPATIENT)
Dept: OBSTETRICS AND GYNECOLOGY | Facility: CLINIC | Age: 39
End: 2019-12-10

## 2019-12-10 VITALS
DIASTOLIC BLOOD PRESSURE: 86 MMHG | SYSTOLIC BLOOD PRESSURE: 128 MMHG | HEIGHT: 62 IN | BODY MASS INDEX: 24.48 KG/M2 | WEIGHT: 133 LBS

## 2019-12-10 DIAGNOSIS — Z01.419 WELL WOMAN EXAM WITH ROUTINE GYNECOLOGICAL EXAM: Primary | ICD-10-CM

## 2019-12-10 PROCEDURE — 87624 HPV HI-RISK TYP POOLED RSLT: CPT | Performed by: NURSE PRACTITIONER

## 2019-12-10 PROCEDURE — G0123 SCREEN CERV/VAG THIN LAYER: HCPCS | Performed by: NURSE PRACTITIONER

## 2019-12-10 PROCEDURE — 99396 PREV VISIT EST AGE 40-64: CPT | Performed by: NURSE PRACTITIONER

## 2019-12-10 RX ORDER — NORGESTIMATE AND ETHINYL ESTRADIOL 0.25-0.035
KIT ORAL
Qty: 28 TABLET | Refills: 3 | Status: SHIPPED | OUTPATIENT
Start: 2019-12-10 | End: 2019-12-10 | Stop reason: SDUPTHER

## 2019-12-10 RX ORDER — INFLUENZA A VIRUS A/MICHIGAN/45/2015 (H1N1) RECOMBINANT HEMAGGLUTININ ANTIGEN, INFLUENZA A VIRUS A/SINGAPORE/INFIMH-16-0019/2016 (H3N2) RECOMBINANT HEMAGGLUTININ ANTIGEN, INFLUENZA B VIRUS B/MARYLAND/15/2016 RECOMBINANT HEMAGGLUTININ ANTIGEN, AND INFLUENZA B VIRUS B/PHUKET/3073/2013 RECOMBINANT HEMAGGLUTININ ANTIGEN 45; 45; 45; 45 UG/.5ML; UG/.5ML; UG/.5ML; UG/.5ML
INJECTION INTRAMUSCULAR
Refills: 0 | COMMUNITY
Start: 2019-10-04 | End: 2020-03-23

## 2019-12-10 RX ORDER — NORGESTIMATE AND ETHINYL ESTRADIOL 0.25-0.035
KIT ORAL
Qty: 28 TABLET | Refills: 12 | Status: SHIPPED | OUTPATIENT
Start: 2019-12-10 | End: 2020-12-14 | Stop reason: SDUPTHER

## 2019-12-10 RX ORDER — ACETAMINOPHEN 500 MG
500 TABLET ORAL EVERY 6 HOURS PRN
COMMUNITY

## 2019-12-10 NOTE — PROGRESS NOTES
Danelle Estrada is a 39 y.o.      Chief Complaint   Patient presents with   • Gynecologic Exam     Pt is here for annual exam PT is doing well no c/o            HPI - Danelle is in foir annual exam.  She is doing well with Sprintec and wishes  to continue.  She is having cyclic 4 day menses.      The following portions of the patient's history were reviewed and updated as appropriate:vital signs, allergies, current medications, past family history, past medical history, past social history, past surgical history and problem list.      Current Outpatient Medications:   •  acetaminophen (TYLENOL) 500 MG tablet, Take 500 mg by mouth Every 6 (Six) Hours As Needed for Mild Pain ., Disp: , Rfl:   •  acyclovir (ZOVIRAX) 400 MG tablet, Take 2 tablets by mouth 3 (Three) Times a Day. Take no more than 5 doses a day., Disp: 30 tablet, Rfl: 2  •  ARIPiprazole (ABILIFY) 10 MG tablet, , Disp: , Rfl:   •  diphenhydrAMINE (BENADRYL) 25 mg capsule, Take 25 mg by mouth Every 6 (Six) Hours As Needed for Itching., Disp: , Rfl:   •  erythromycin (ROMYCIN) 5 MG/GM ophthalmic ointment, Apply to suture line three times daily as directed, Disp: 3.5 g, Rfl: 1  •  FLUBLOK QUADRIVALENT 0.5 ML solution prefilled syringe IM suspension, PHARMACIST ADMINISTERED IMMUNIZATION ADMINISTERED AT TIME OF DISPENSING, Disp: , Rfl: 0  •  HYDROcodone-acetaminophen (NORCO) 7.5-325 MG per tablet, Take 1 tablet by mouth every 4 hours as needed for pain, Disp: 40 tablet, Rfl: 0  •  lisinopril (PRINIVIL,ZESTRIL) 5 MG tablet, Take  by mouth Daily., Disp: , Rfl: 1  •  Multiple Vitamins-Minerals (MULTIVITAMIN ADULTS PO), Take  by mouth., Disp: , Rfl:   •  norgestimate-ethinyl estradiol (SPRINTEC 28) 0.25-35 MG-MCG per tablet, Take 1 tablet by mouth Daily., Disp: 28 tablet, Rfl: 3  •  QUEtiapine (SEROquel) 200 MG tablet, , Disp: , Rfl:   •  VERAPAMIL HCL ER PO, Take  by mouth., Disp: , Rfl:   •  NON FORMULARY, Birth control, Disp: , Rfl:     Review of  "Systems   Constitutional: Negative for activity change, diaphoresis and unexpected weight loss.   HENT: Negative for congestion, drooling and hearing loss.    Eyes: Negative for double vision and itching.   Respiratory: Negative for chest tightness and shortness of breath.    Cardiovascular: Negative for chest pain.   Gastrointestinal: Negative for blood in stool, constipation and diarrhea.   Endocrine: Negative for cold intolerance and heat intolerance.   Genitourinary: Negative for breast lump, dyspareunia, frequency, pelvic pain and vaginal discharge.   Musculoskeletal: Negative for arthralgias, back pain, neck pain and neck stiffness.   Skin: Negative for rash.   Neurological: Negative for dizziness, syncope, numbness and headache.   Psychiatric/Behavioral: Positive for sleep disturbance. Negative for dysphoric mood and suicidal ideas. The patient is not nervous/anxious.         Psych. Meds. Prescribed by another provider         Objective      /86   Ht 157.5 cm (62\")   Wt 60.3 kg (133 lb)   LMP 11/21/2019 (Exact Date)   BMI 24.33 kg/m²       Physical Exam   Constitutional: She is oriented to person, place, and time. She appears well-developed and well-nourished. No distress.   HENT:   Head: Normocephalic and atraumatic.   Eyes: Right eye exhibits no discharge. Left eye exhibits no discharge.   Neck: Normal range of motion. Neck supple.   Cardiovascular: Normal rate and regular rhythm.   No murmur heard.  Pulmonary/Chest: Effort normal and breath sounds normal. Right breast exhibits no inverted nipple, no mass and no nipple discharge. Left breast exhibits no inverted nipple, no mass and no nipple discharge. No breast tenderness or discharge.   Abdominal: Soft. She exhibits no distension. There is no tenderness.   Genitourinary: Vagina normal and uterus normal. Pelvic exam was performed with patient supine. There is no tenderness or lesion on the right labia. There is no tenderness or lesion on the left " labia. Cervix does not exhibit motion tenderness, discharge or friability. Right adnexum displays no tenderness and no fullness. Left adnexum displays no tenderness and no fullness. No tenderness or bleeding in the vagina. No vaginal discharge found.   Musculoskeletal: Normal range of motion. She exhibits no edema.   Neurological: She is alert and oriented to person, place, and time.   Skin: Skin is warm and dry.   Psychiatric: She has a normal mood and affect. Her behavior is normal. Judgment normal.   Nursing note and vitals reviewed.       Assessment/Plan     ASSESSMENT/PLAN      Danelle was seen today for gynecologic exam.    Diagnoses and all orders for this visit:    Well woman exam with routine gynecological exam  -     Liquid-based Pap Smear, Screening  -     norgestimate-ethinyl estradiol (SPRINTEC 28) 0.25-35 MG-MCG per tablet; 1 po daily    Counseled re: side effects to report of BCP.  Her mammogram is current.  Her mat. GM had ovarian cancer in her 80's and her mat GM had breast cancer.  Patient reports she had geneti testing elsewhere and it was negative.    Patient is counseled re: BCP use, risks/benifits, and side effects.        Alyssia Blankenship, APRN  12/10/2019

## 2019-12-12 LAB
GEN CATEG CVX/VAG CYTO-IMP: NORMAL
HPV I/H RISK 4 DNA CVX QL PROBE+SIG AMP: NOT DETECTED
LAB AP CASE REPORT: NORMAL
LAB AP GYN ADDITIONAL INFORMATION: NORMAL
PATH INTERP SPEC-IMP: NORMAL
STAT OF ADQ CVX/VAG CYTO-IMP: NORMAL

## 2020-01-04 ENCOUNTER — NURSE TRIAGE (OUTPATIENT)
Dept: CALL CENTER | Facility: HOSPITAL | Age: 40
End: 2020-01-04

## 2020-01-05 NOTE — TELEPHONE ENCOUNTER
"Caller asking about egg retrieval process. She was educated on the process and advised make appointment with GYN Monday morning.     Reason for Disposition  • Health Information question, no triage required and triager able to answer question    Additional Information  • Negative: [1] Caller is not with the adult (patient) AND [2] reporting urgent symptoms  • Negative: Lab result questions  • Negative: Medication questions  • Negative: Caller can't be reached by phone  • Negative: Caller has already spoken to PCP or another triager  • Negative: RN needs further essential information from caller in order to complete triage  • Negative: Requesting regular office appointment  • Negative: [1] Caller requesting NON-URGENT health information AND [2] PCP's office is the best resource    Answer Assessment - Initial Assessment Questions  1. REASON FOR CALL or QUESTION: \"What is your reason for calling today?\" or \"How can I best help you?\" or \"What question do you have that I can help answer?\"      Caller asking about egg retrieval process    Protocols used: INFORMATION ONLY CALL-ADULT-      "

## 2020-01-21 ENCOUNTER — TELEPHONE (OUTPATIENT)
Dept: OBSTETRICS AND GYNECOLOGY | Facility: CLINIC | Age: 40
End: 2020-01-21

## 2020-01-21 NOTE — TELEPHONE ENCOUNTER
----- Message from Bre Jordan DO sent at 1/17/2020  8:59 PM CST -----      ----- Message -----  From: Jennifer Cohn  Sent: 10/24/2019  10:44 AM CST  To: Bre Jordan DO

## 2020-01-21 NOTE — TELEPHONE ENCOUNTER
Spoke with PT she was denied the genetic testing through the office PT did have [facility] testing done and she will bring the report in from that

## 2020-02-29 ENCOUNTER — NURSE TRIAGE (OUTPATIENT)
Dept: CALL CENTER | Facility: HOSPITAL | Age: 40
End: 2020-02-29

## 2020-03-23 ENCOUNTER — NURSE TRIAGE (OUTPATIENT)
Dept: CALL CENTER | Facility: HOSPITAL | Age: 40
End: 2020-03-23

## 2020-03-23 PROCEDURE — 87661 TRICHOMONAS VAGINALIS AMPLIF: CPT | Performed by: NURSE PRACTITIONER

## 2020-03-23 PROCEDURE — 87591 N.GONORRHOEAE DNA AMP PROB: CPT | Performed by: NURSE PRACTITIONER

## 2020-03-23 PROCEDURE — 87491 CHLMYD TRACH DNA AMP PROBE: CPT | Performed by: NURSE PRACTITIONER

## 2020-03-23 NOTE — TELEPHONE ENCOUNTER
"Caller states that she has a UTI and tried to call [facility], they are closed at this time and providers are working at [facility].  Questions answered.    Reason for Disposition  • Information only question and nurse able to answer    Additional Information  • Negative: Nursing judgment  • Negative: Nursing judgment  • Negative: Nursing judgment  • Negative: Nursing judgment    Answer Assessment - Initial Assessment Questions  1. REASON FOR CALL or QUESTION: \"What is your reason for calling today?\" or \"How can I best help you?\" or \"What question do you have that I can help answer?\"      I think I have a UTI is [facility] closed?    Protocols used: INFORMATION ONLY CALL - NO TRIAGE-ADULT-[place]      "

## 2020-05-12 ENCOUNTER — TRANSCRIBE ORDERS (OUTPATIENT)
Dept: ADMINISTRATIVE | Facility: HOSPITAL | Age: 40
End: 2020-05-12

## 2020-05-12 DIAGNOSIS — E03.9 HYPOTHYROIDISM, UNSPECIFIED TYPE: ICD-10-CM

## 2020-05-12 DIAGNOSIS — Z00.01 ENCOUNTER FOR GENERAL ADULT MEDICAL EXAMINATION WITH ABNORMAL FINDINGS: Primary | ICD-10-CM

## 2020-05-12 DIAGNOSIS — I10 ESSENTIAL (PRIMARY) HYPERTENSION: ICD-10-CM

## 2020-05-13 ENCOUNTER — LAB (OUTPATIENT)
Dept: LAB | Facility: HOSPITAL | Age: 40
End: 2020-05-13

## 2020-05-13 DIAGNOSIS — E03.9 HYPOTHYROIDISM, UNSPECIFIED TYPE: ICD-10-CM

## 2020-05-13 DIAGNOSIS — Z00.01 ENCOUNTER FOR GENERAL ADULT MEDICAL EXAMINATION WITH ABNORMAL FINDINGS: ICD-10-CM

## 2020-05-13 DIAGNOSIS — I10 ESSENTIAL (PRIMARY) HYPERTENSION: ICD-10-CM

## 2020-05-13 LAB
ALBUMIN SERPL-MCNC: 3.9 G/DL (ref 3.5–5.2)
ALBUMIN/GLOB SERPL: 1.2 G/DL
ALP SERPL-CCNC: 66 U/L (ref 39–117)
ALT SERPL W P-5'-P-CCNC: 13 U/L (ref 1–33)
ANION GAP SERPL CALCULATED.3IONS-SCNC: 12.9 MMOL/L (ref 5–15)
AST SERPL-CCNC: 21 U/L (ref 1–32)
BASOPHILS # BLD AUTO: 0.04 10*3/MM3 (ref 0–0.2)
BASOPHILS NFR BLD AUTO: 0.5 % (ref 0–1.5)
BILIRUB SERPL-MCNC: 0.4 MG/DL (ref 0.2–1.2)
BUN BLD-MCNC: 8 MG/DL (ref 6–20)
BUN/CREAT SERPL: 9 (ref 7–25)
CALCIUM SPEC-SCNC: 9 MG/DL (ref 8.6–10.5)
CHLORIDE SERPL-SCNC: 101 MMOL/L (ref 98–107)
CHOLEST SERPL-MCNC: 173 MG/DL (ref 0–200)
CO2 SERPL-SCNC: 22.1 MMOL/L (ref 22–29)
CREAT BLD-MCNC: 0.89 MG/DL (ref 0.57–1)
DEPRECATED RDW RBC AUTO: 40.9 FL (ref 37–54)
EOSINOPHIL # BLD AUTO: 0.02 10*3/MM3 (ref 0–0.4)
EOSINOPHIL NFR BLD AUTO: 0.2 % (ref 0.3–6.2)
ERYTHROCYTE [DISTWIDTH] IN BLOOD BY AUTOMATED COUNT: 12.6 % (ref 12.3–15.4)
GFR SERPL CREATININE-BSD FRML MDRD: 85 ML/MIN/1.73
GLOBULIN UR ELPH-MCNC: 3.2 GM/DL
GLUCOSE BLD-MCNC: 74 MG/DL (ref 65–99)
HCT VFR BLD AUTO: 37.3 % (ref 34–46.6)
HDLC SERPL-MCNC: 81 MG/DL (ref 40–60)
HGB BLD-MCNC: 12.9 G/DL (ref 12–15.9)
IMM GRANULOCYTES # BLD AUTO: 0.04 10*3/MM3 (ref 0–0.05)
IMM GRANULOCYTES NFR BLD AUTO: 0.5 % (ref 0–0.5)
LDLC SERPL CALC-MCNC: 69 MG/DL (ref 0–100)
LDLC/HDLC SERPL: 0.85 {RATIO}
LYMPHOCYTES # BLD AUTO: 2.12 10*3/MM3 (ref 0.7–3.1)
LYMPHOCYTES NFR BLD AUTO: 24.7 % (ref 19.6–45.3)
MCH RBC QN AUTO: 30.9 PG (ref 26.6–33)
MCHC RBC AUTO-ENTMCNC: 34.6 G/DL (ref 31.5–35.7)
MCV RBC AUTO: 89.4 FL (ref 79–97)
MONOCYTES # BLD AUTO: 0.72 10*3/MM3 (ref 0.1–0.9)
MONOCYTES NFR BLD AUTO: 8.4 % (ref 5–12)
NEUTROPHILS # BLD AUTO: 5.64 10*3/MM3 (ref 1.7–7)
NEUTROPHILS NFR BLD AUTO: 65.7 % (ref 42.7–76)
NRBC BLD AUTO-RTO: 0 /100 WBC (ref 0–0.2)
PLATELET # BLD AUTO: 363 10*3/MM3 (ref 140–450)
PMV BLD AUTO: 9.3 FL (ref 6–12)
POTASSIUM BLD-SCNC: 3.7 MMOL/L (ref 3.5–5.2)
PROT SERPL-MCNC: 7.1 G/DL (ref 6–8.5)
RBC # BLD AUTO: 4.17 10*6/MM3 (ref 3.77–5.28)
SODIUM BLD-SCNC: 136 MMOL/L (ref 136–145)
T4 FREE SERPL-MCNC: 0.99 NG/DL (ref 0.93–1.7)
TRIGL SERPL-MCNC: 115 MG/DL (ref 0–150)
TSH SERPL DL<=0.05 MIU/L-ACNC: 0.32 UIU/ML (ref 0.27–4.2)
VLDLC SERPL-MCNC: 23 MG/DL (ref 5–40)
WBC NRBC COR # BLD: 8.58 10*3/MM3 (ref 3.4–10.8)

## 2020-05-13 PROCEDURE — 84439 ASSAY OF FREE THYROXINE: CPT | Performed by: FAMILY MEDICINE

## 2020-05-13 PROCEDURE — 36415 COLL VENOUS BLD VENIPUNCTURE: CPT

## 2020-05-13 PROCEDURE — 80053 COMPREHEN METABOLIC PANEL: CPT | Performed by: FAMILY MEDICINE

## 2020-05-13 PROCEDURE — 84443 ASSAY THYROID STIM HORMONE: CPT | Performed by: FAMILY MEDICINE

## 2020-05-13 PROCEDURE — 80061 LIPID PANEL: CPT | Performed by: FAMILY MEDICINE

## 2020-05-13 PROCEDURE — 85025 COMPLETE CBC W/AUTO DIFF WBC: CPT | Performed by: FAMILY MEDICINE

## 2020-06-22 ENCOUNTER — OFFICE VISIT (OUTPATIENT)
Dept: OBSTETRICS AND GYNECOLOGY | Facility: CLINIC | Age: 40
End: 2020-06-22

## 2020-06-22 VITALS
SYSTOLIC BLOOD PRESSURE: 118 MMHG | DIASTOLIC BLOOD PRESSURE: 66 MMHG | WEIGHT: 148 LBS | HEIGHT: 62 IN | BODY MASS INDEX: 27.23 KG/M2

## 2020-06-22 DIAGNOSIS — D21.9 FIBROIDS: ICD-10-CM

## 2020-06-22 DIAGNOSIS — R35.0 URINE FREQUENCY: ICD-10-CM

## 2020-06-22 DIAGNOSIS — R10.2 PELVIC PAIN: Primary | ICD-10-CM

## 2020-06-22 DIAGNOSIS — Z80.41 FAMILY HISTORY OF OVARIAN CANCER: ICD-10-CM

## 2020-06-22 DIAGNOSIS — N94.89 ADNEXAL MASS: ICD-10-CM

## 2020-06-22 LAB
BILIRUB BLD-MCNC: NEGATIVE MG/DL
CLARITY, POC: CLEAR
COLOR UR: YELLOW
GLUCOSE UR STRIP-MCNC: NEGATIVE MG/DL
KETONES UR QL: NEGATIVE
LEUKOCYTE EST, POC: NEGATIVE
NITRITE UR-MCNC: NEGATIVE MG/ML
PH UR: 6.5 [PH] (ref 5–8)
PROT UR STRIP-MCNC: ABNORMAL MG/DL
RBC # UR STRIP: NEGATIVE /UL
SP GR UR: 1 (ref 1–1.03)
UROBILINOGEN UR QL: NORMAL

## 2020-06-22 PROCEDURE — 87661 TRICHOMONAS VAGINALIS AMPLIF: CPT | Performed by: NURSE PRACTITIONER

## 2020-06-22 PROCEDURE — 87491 CHLMYD TRACH DNA AMP PROBE: CPT | Performed by: NURSE PRACTITIONER

## 2020-06-22 PROCEDURE — 87591 N.GONORRHOEAE DNA AMP PROB: CPT | Performed by: NURSE PRACTITIONER

## 2020-06-22 PROCEDURE — 99214 OFFICE O/P EST MOD 30 MIN: CPT | Performed by: NURSE PRACTITIONER

## 2020-06-22 RX ORDER — EPINEPHRINE 0.3 MG/.3ML
INJECTION SUBCUTANEOUS
COMMUNITY
Start: 2020-05-05 | End: 2022-02-24 | Stop reason: SDUPTHER

## 2020-06-22 NOTE — PROGRESS NOTES
Danelle Estrada is a 39 y.o.      Chief Complaint   Patient presents with   • Pelvic Pain     x 3 months. pt states that she started having pelvic pain daily. pt states that the pain is not as bad today. pt had us in office today           HPI - Danelle has had pain in the midline of the lower abd. for 3 months, it is fairly constant.  She has no bowel issues.  She had UTI's x 2 in May and treated each time.  When pointing to the area that hurts, it is over the upper bladder area and low mid abd.  She has not been running a fever.  She states she takes Sprintec to prevent cysts on her ovaries.  She denies sexual activity for 4 years.      The following portions of the patient's history were reviewed and updated as appropriate:vital signs, allergies, current medications, past family history, past medical history, past social history, past surgical history and problem list.      Current Outpatient Medications:   •  acetaminophen (TYLENOL) 500 MG tablet, Take 500 mg by mouth Every 6 (Six) Hours As Needed for Mild Pain ., Disp: , Rfl:   •  acyclovir (ZOVIRAX) 400 MG tablet, Take 2 tablets by mouth 3 (Three) Times a Day. Take no more than 5 doses a day., Disp: 30 tablet, Rfl: 2  •  diphenhydrAMINE (BENADRYL) 25 mg capsule, Take 25 mg by mouth Every 6 (Six) Hours As Needed for Itching., Disp: , Rfl:   •  EPINEPHrine (EPIPEN) 0.3 MG/0.3ML solution auto-injector injection, , Disp: , Rfl:   •  lisinopril (PRINIVIL,ZESTRIL) 5 MG tablet, Take 5 mg by mouth Daily., Disp: , Rfl: 1  •  Multiple Vitamins-Minerals (MULTIVITAMIN ADULTS PO), Take  by mouth., Disp: , Rfl:   •  norgestimate-ethinyl estradiol (SPRINTEC 28) 0.25-35 MG-MCG per tablet, 1 po daily (Patient taking differently: Take 1 tablet by mouth Daily. 1 po daily), Disp: 28 tablet, Rfl: 12  •  QUEtiapine (SEROquel) 200 MG tablet, Take 200 mg by mouth Every Night., Disp: , Rfl:   •  verapamil SR (CALAN-SR) 180 MG CR tablet, Take 180 mg by mouth Daily., Disp:  ", Rfl:     Review of Systems   Constitutional: Negative for activity change and fever.   HENT: Negative for congestion, dental problem and rhinorrhea.    Cardiovascular: Negative for chest pain and leg swelling.   Gastrointestinal: Positive for abdominal pain. Negative for abdominal distention, constipation and rectal pain.   Genitourinary: Positive for pelvic pain. Negative for breast pain, flank pain, pelvic pressure and vaginal bleeding.   Musculoskeletal: Negative for arthralgias, myalgias and bursitis.   Neurological: Negative for syncope, memory problem and confusion.   Psychiatric/Behavioral: Negative for agitation, dysphoric mood, suicidal ideas and stress.         Objective      /66   Ht 157.5 cm (62\")   Wt 67.1 kg (148 lb)   LMP 06/02/2020 (Exact Date)   Breastfeeding No   BMI 27.07 kg/m²       Physical Exam   Constitutional: She appears well-developed and well-nourished.   HENT:   Head: Normocephalic and atraumatic.   Eyes: Right eye exhibits discharge. Left eye exhibits no discharge.   Abdominal: Soft. She exhibits no distension. There is tenderness (mild, above bladder). There is no rebound and no guarding. No hernia.   Psychiatric: She has a normal mood and affect. Her behavior is normal.   Nursing note and vitals reviewed.       Assessment/Plan     Danelle was seen today for pelvic pain.                  Diagnoses and all orders for this visit:    Pelvic pain   X 3 months, no associated symptoms, constant dull, patient points to just above the bladder, seems out of the pelvis.    She had 2 UTI's in May treated, she has no dysuria now but has frequency.  She is not running a fever, does not appear to be in acute pain, she has been taking Tylenol        -     Gynecologic Fluid, Supplemental Testing        -     Chlamydia trachomatis, Neisseria gonorrhoeae, PCR - ThinPrep Vial, Cervix        -     Trichomonas vaginalis, PCR - ThinPrep Vial, Cervix    Urine frequency  -     Urinalysis With " Microscopic - Urine, Clean Catch  -     Urine Culture, Comprehen ([facility]) - Urine, Clean Catch  -     POC Urinalysis Dipstick  Trace of protein    Adnexal mass  -     Ovarian Malignancy Risk-ROBYN    Family history of ovarian cancer  -     Ambulatory Referral to Genetic Counseling/Testing - [facility]  Mat. GM    Fibroids several, largest 4-5 cm with the adnexal mass possibly a pedunculated fibroid  RTO and see Dr. Suellen Jordan.  Patient is to call if symptoms worsen, she does not appear to be in significant pain.                Alyssia Blankenship, APRN  6/22/2020

## 2020-06-23 LAB
C TRACH RRNA CVX QL NAA+PROBE: NOT DETECTED
CANCER AG125 SERPL-ACNC: 7.2 U/ML (ref 0–38.1)
HE4 SERPL-SCNC: 40.9 PMOL/L (ref 0–63.6)
LAB AP CASE REPORT: NORMAL
LABORATORY COMMENT REPORT: NORMAL
Lab: NORMAL
N GONORRHOEA RRNA SPEC QL NAA+PROBE: NOT DETECTED
PATH INTERP SPEC-IMP: NORMAL
POSTMENOPAUSAL INTERP: LOW: NORMAL
PREMENOPAUSAL INTERP: LOW: NORMAL
ROMA SCORE POSTMEN SERPL: 0.58
ROMA SCORE PREMEN SERPL: 0.45
STAT OF ADQ CVX/VAG CYTO-IMP: NORMAL
TRICHOMONAS VAGINALIS PCR: NOT DETECTED

## 2020-06-25 DIAGNOSIS — N39.0 URINARY TRACT INFECTION WITHOUT HEMATURIA, SITE UNSPECIFIED: Primary | ICD-10-CM

## 2020-06-25 LAB
APPEARANCE UR: CLEAR
BACTERIA #/AREA URNS HPF: NORMAL /HPF
BACTERIA UR CULT: ABNORMAL
BACTERIA UR CULT: ABNORMAL
BILIRUB UR QL STRIP: NEGATIVE
CASTS URNS MICRO: NORMAL
COLOR UR: YELLOW
EPI CELLS #/AREA URNS HPF: NORMAL /HPF
GLUCOSE UR QL: NEGATIVE
HGB UR QL STRIP: NEGATIVE
KETONES UR QL STRIP: NEGATIVE
LEUKOCYTE ESTERASE UR QL STRIP: NEGATIVE
NITRITE UR QL STRIP: NEGATIVE
OTHER ANTIBIOTIC SUSC ISLT: ABNORMAL
PH UR STRIP: 7 [PH] (ref 5–8)
PROT UR QL STRIP: NEGATIVE
RBC #/AREA URNS HPF: NORMAL /HPF
SP GR UR: 1.01 (ref 1–1.03)
UROBILINOGEN UR STRIP-MCNC: NORMAL MG/DL
WBC #/AREA URNS HPF: NORMAL /HPF

## 2020-06-25 RX ORDER — NITROFURANTOIN 25; 75 MG/1; MG/1
100 CAPSULE ORAL 2 TIMES DAILY
Qty: 14 CAPSULE | Refills: 0 | Status: SHIPPED | OUTPATIENT
Start: 2020-06-25 | End: 2020-07-02

## 2020-06-25 NOTE — PROGRESS NOTES
Tell Danelle that I sent in an antibiotic for her to take for a bladder infection.  Her other labs were ok.  Keep her followup appointment with Dr. Jordan.  MACY Natarajan

## 2020-06-25 NOTE — PROGRESS NOTES
UTI per ua culture  Macrobid  Bid x 7 sent in to [facility] and message to Laura hutchinson advise the patient.  Alyssia Blankenship, APRN

## 2020-07-13 ENCOUNTER — OFFICE VISIT (OUTPATIENT)
Dept: OBSTETRICS AND GYNECOLOGY | Facility: CLINIC | Age: 40
End: 2020-07-13

## 2020-07-13 VITALS
SYSTOLIC BLOOD PRESSURE: 120 MMHG | WEIGHT: 152 LBS | BODY MASS INDEX: 27.97 KG/M2 | DIASTOLIC BLOOD PRESSURE: 74 MMHG | HEIGHT: 62 IN

## 2020-07-13 DIAGNOSIS — R10.2 PELVIC PAIN: Primary | ICD-10-CM

## 2020-07-13 DIAGNOSIS — N94.89 ADNEXAL MASS: ICD-10-CM

## 2020-07-13 DIAGNOSIS — D21.9 FIBROIDS: ICD-10-CM

## 2020-07-13 PROCEDURE — 99213 OFFICE O/P EST LOW 20 MIN: CPT | Performed by: OBSTETRICS & GYNECOLOGY

## 2020-07-13 NOTE — PROGRESS NOTES
"Subjective   Danelle Estrada is a 39 y.o. female.     Chief Complaint   Patient presents with   • Fibroids     PT is here for follow up for fibroids.  PT had an US done at last visit. Pt does have some pelvic and abdominal pain        39 year old female  LMP 2020 follow-up on fibroids.  Patient was recently seen and evaluated by Piper Blankenship and at that time her transvaginal ultrasound revealed multiple fibroids with the largest measuring centimeters.  She does report some complaints of pelvic pain and pressure at this time.  She reports her cycles are regular occurring every 4 weeks and that they last approximately 4 days.  She does not report that they are overly heavy or painful. Overall she is happy on her OCP        Pelvic Pain   The patient's primary symptoms include pelvic pain. This is a new problem. The current episode started more than 1 month ago. The problem has been unchanged. The pain is mild. She is not pregnant. Pertinent negatives include no constipation or diarrhea.        Review of Systems   Gastrointestinal: Negative for constipation and diarrhea.   Genitourinary: Positive for pelvic pain and pelvic pressure. Negative for vaginal bleeding.       Objective   /74   Ht 157.5 cm (62\")   Wt 68.9 kg (152 lb)   LMP 2020 (Exact Date)   BMI 27.80 kg/m²   Patient's last menstrual period was 2020 (exact date).  Physical Exam   Constitutional: She appears well-developed and well-nourished. No distress.   HENT:   Head: Normocephalic and atraumatic.   Pulmonary/Chest: Effort normal.   Abdominal: Soft. She exhibits no mass. There is no tenderness. There is no rebound and no guarding.   Genitourinary: Vagina normal and cervix normal. Pelvic exam was performed with patient supine. There is no tenderness or lesion on the right labia. There is no tenderness or lesion on the left labia. Uterus is enlarged. Uterus is not tender. Cervix does not exhibit motion tenderness, discharge or " friability. Right adnexum displays no tenderness and no fullness. Left adnexum displays no tenderness and no fullness. No tenderness or bleeding in the vagina. No signs of injury around the vagina. No vaginal discharge found.   Nursing note and vitals reviewed.        Assessment/Plan   Problems Addressed this Visit     None      Visit Diagnoses     Pelvic pain    -  Primary    Fibroids        Adnexal mass          -Patient declined further management this time.  She reports that she is currently happy on her oral contraceptives.  -Discussed with patient her Ca1 25 that was low.  -Patient return to clinic in 8 weeks for follow-up transvaginal ultrasound and examination.  -Discussed with patient signs and symptoms that she should monitor for.       Bre Jordan, DO

## 2020-09-09 ENCOUNTER — OFFICE VISIT (OUTPATIENT)
Dept: OBSTETRICS AND GYNECOLOGY | Facility: CLINIC | Age: 40
End: 2020-09-09

## 2020-09-09 VITALS
SYSTOLIC BLOOD PRESSURE: 124 MMHG | WEIGHT: 154 LBS | HEIGHT: 62 IN | DIASTOLIC BLOOD PRESSURE: 80 MMHG | BODY MASS INDEX: 28.34 KG/M2

## 2020-09-09 DIAGNOSIS — D21.9 FIBROIDS: Primary | ICD-10-CM

## 2020-09-09 PROCEDURE — 99213 OFFICE O/P EST LOW 20 MIN: CPT | Performed by: OBSTETRICS & GYNECOLOGY

## 2020-09-09 NOTE — PROGRESS NOTES
"Subjective   Danelle Estrada is a 39 y.o. female.     Chief Complaint   Patient presents with   • Fibroids     pt is here for a follow up for fibroids   PT states that her pain is not has bad as it was.         39-year-old female  0 para 0 presents for follow-up examination on uterine fibroids.  Patient reports overall she is doing well at this time.  She reports minimal pain.  She does have some back pain at this time.  But she is not sure if it is related.  She reports her cycles are extremely regular.  She reports they normally last approximately 4 days and overall she is extremely happy on her Sprintec.  She denies any changes in weight.  She denies any bloating.  She denies any GI symptoms.  No genitourinary symptoms at this time.       Review of Systems   Constitutional: Negative for unexpected weight gain.   Gastrointestinal: Negative for abdominal distention.   Genitourinary: Negative for menstrual problem and pelvic pain.       Objective   /80   Ht 157.5 cm (62\")   Wt 69.9 kg (154 lb)   LMP 2020 (Exact Date)   BMI 28.17 kg/m²   Patient's last menstrual period was 2020 (exact date).  Physical Exam   Constitutional: She is oriented to person, place, and time. She appears well-developed and well-nourished. No distress.   HENT:   Head: Normocephalic and atraumatic.   Eyes: Conjunctivae and EOM are normal. Right eye exhibits no discharge. Left eye exhibits no discharge.   Neck: Normal range of motion.   Pulmonary/Chest: Effort normal.   Musculoskeletal: Normal range of motion.   Neurological: She is alert and oriented to person, place, and time.   Skin: Skin is warm and dry.   Psychiatric: She has a normal mood and affect. Her behavior is normal. Judgment normal.   Nursing note and vitals reviewed.    Assessment/Plan   Problems Addressed this Visit     None      Visit Diagnoses     Fibroids    -  Primary      -Minimal change to previous ultrasound.  Patient is currently asymptomatic " and does not desire further management.  -Discussed with patient that if her symptoms were to worsen she would need to seek immediate medical attention.  -Patient return to clinic in 3 months for follow-up transvaginal ultrasound.       Bre Jordan, DO

## 2020-09-10 ENCOUNTER — APPOINTMENT (OUTPATIENT)
Dept: ONCOLOGY | Facility: HOSPITAL | Age: 40
End: 2020-09-10

## 2020-09-10 ENCOUNTER — DOCUMENTATION (OUTPATIENT)
Dept: GENETICS | Facility: HOSPITAL | Age: 40
End: 2020-09-10

## 2020-09-10 PROCEDURE — G0463 HOSPITAL OUTPT CLINIC VISIT: HCPCS

## 2020-09-14 NOTE — PROGRESS NOTES
Danelle Estrada, a 40-year-old female, was referred for genetic counseling due to a family history of breast cancer.  Genetic counseling was provided via telemedicine.  Ms. Estrada was 11 years old at menarche and is nulliparous.  She is premenopausal and retains her uterus and ovaries.  She has not had any prior breast imaging.  She was interested in discussing her risk for a hereditary cancer syndrome.   Ms. Estrada decided to pursue comprehensive genetic testing to evaluate her risk of cancer, therefore the CancerNext panel was ordered through [facility] which analyzes 36 genes associated with an increased cancer risk. The genes on this panel include APC, ANA CRISTINA, AXIN2, BARD1, BMPR1A, BRCA1, BRCA2, BRIP1, CDH1, CDK4, CDKN2A, CHEK2, DICER1, EPCAM, GREM1, HOXB13, MLH1, MRE11A, MSH2, MSH3, MSH6, MUTYH, NBN, NF1, NTHL1, PALB2, PMS2, POLD1, POLE, PTEN, RAD51C, RAD51D, RECQL, SMAD4, SMARCA4, STK11, and TP53. Results are expected in approximately 2-3 weeks.    PERTINENT FAMILY HISTORY: (See attached pedigree)   Pat. Aunt:   Breast cancer, 30s  Mat. Grandmother: Ovarian cancer, 70s     We do not have medical records regarding any of these diagnoses.      RISK ASSESSMENT:  Ms. Estrada’s family history raised the question of a hereditary cancer syndrome.  clearly meets NCCN guidelines criteria for BRCA1/2 testing on both sides of her family based on having a close relative diagnosed with ovarian cancer as well as a relative diagnosed with breast cancer under age 50.  When affected relatives are unavailable to pursue genetic testing, it is reasonable to offer to an unaffected individual.  Based on the presence of other clinically significant breast cancer related genes, the standard approach to hereditary cancer genetic testing at this time is via multi-gene panel, which evaluates for BRCA1/2 as well as several additional genes associated with a hereditary risk for breast cancer and other cancers. If genetic testing is negative,  management should be guided by a family history-based risk assessment.    GENETIC COUNSELING:  We reviewed the family history information in detail. Cases of cancer follow three general patterns: sporadic, familial, and hereditary.  While most cancer is sporadic, some cases appear to occur in family clusters.  These cases are said to be familial and account for 10-20% of cancer cases.  Familial cases may be due to a combination of shared genes and environmental factors among family members.  In even fewer families, the risk for cancer is inherited, and the genes responsible for the increased cancer risk are known.      Family histories typical of hereditary cancer syndromes usually include multiple first- and second-degree relatives diagnosed with cancer types that define a syndrome.  These cases tend to be diagnosed at younger-than-expected ages and can be bilateral or multifocal.  The cancer in these families follows an autosomal dominant inheritance pattern, which indicates the likely presence of a mutation in a cancer susceptibility gene.  Children and siblings of an individual believed to carry this mutation have a 50% chance of inheriting that mutation, thereby inheriting the increased risk to develop cancer.  These mutations can be passed down from the maternal or the paternal lineage.    Hereditary breast cancer accounts for 5-10% of all cases of breast cancer.  A significant proportion (50%) of hereditary breast cancer can be attributed to mutations in the BRCA1 and BRCA2 genes.  Mutations in these genes confer an increased risk for breast cancer, ovarian cancer, male breast cancer, prostate cancer, and pancreatic cancer.  Women with a BRCA1 or BRCA2 mutation have up to an 87% lifetime risk of breast cancer and up to a 44% risk of ovarian cancer.  There are other clinically significant breast cancer related genes in addition to BRCA1/2, including PALB2, ANA CRISTINA, and CHEK2. There are other, more rare, hereditary  cancer syndromes. Based on Ms. Estrada’s family history and her desire to get more information regarding her personal risks, she opted to pursue testing through a panel evaluating several other genes known to increase the risk for cancer.    GENETIC TESTING:  The risks, benefits, and limitations of genetic testing and implications for clinical management following testing were reviewed.  DNA test results can influence decisions regarding screening and prevention.  Genetic testing can have significant psychological implications for both individuals and families. Also discussed was the possibility of employment and insurance discrimination based on genetic test results and the laws in place to prevent this, as well as the limitations of these laws.      We discussed panel testing, which would involve testing 34 genes associated with increased cancer risk. The implications of a positive or negative test result were discussed.  We also discussed the importance of testing on an affected relative. In general, a negative genetic test result is most informative if a mutation has first been established in an affected member of the family.  In cases where an affected individual is not available or interested in testing, it is appropriate to offer testing to an unaffected individual. We discussed the possibility that, in some cases, genetic test results may be ambiguous due to the identification of a genetic variant. These variants may or may not be associated with an increased cancer risk. With multigene panel testing, it is not uncommon for a variant of uncertain significance (VUS) to be identified.  If a VUS is identified, testing family members is typically not recommended and screening recommendations are made based on the family history.  The laboratories that perform genetic testing work to reclassify the VUS and send out an amended report if and when a VUS is reclassified.  The majority of variant findings are ultimately  reclassified to a negative result. Given her family history, a negative test result does not eliminate all cancer risk, although the risk would not be as high as it would with positive genetic testing. We also discussed that some of the genes on this particular panel have not been well studied yet and there may not be clear implications or guidelines for some of the genes included on this comprehensive panel.    PLAN: Genetic testing via the CancerNext panel through [facility] was ordered.  We will contact MsCeleste Sean with her results once they are received.     Neeta Ibarra MS, [facility] [place]  Licensed Certified Genetic Counselor

## 2020-09-28 ENCOUNTER — DOCUMENTATION (OUTPATIENT)
Dept: GENETICS | Facility: HOSPITAL | Age: 40
End: 2020-09-28

## 2020-10-22 ENCOUNTER — NURSE TRIAGE (OUTPATIENT)
Dept: CALL CENTER | Facility: HOSPITAL | Age: 40
End: 2020-10-22

## 2020-10-22 NOTE — TELEPHONE ENCOUNTER
"    Reason for Disposition  • General information question, no triage required and triager able to answer question    Additional Information  • Negative: [1] Caller is not with the adult (patient) AND [2] reporting urgent symptoms  • Negative: Lab result questions  • Negative: Medication questions  • Negative: Caller can't be reached by phone  • Negative: Caller has already spoken to PCP or another triager  • Negative: RN needs further essential information from caller in order to complete triage  • Negative: Requesting regular office appointment  • Negative: [1] Caller requesting NON-URGENT health information AND [2] PCP's office is the best resource  • Negative: Health Information question, no triage required and triager able to answer question    Answer Assessment - Initial Assessment Questions  1. REASON FOR CALL or QUESTION: \"What is your reason for calling today?\" or \"How can I best help you?\" or \"What question do you have that I can help answer?\"      Caller asking why do you take hormones during IVF and can you have uterus removed through your belly button.    Hormones are given to stimulate ovaries more than one egg to develop at a time.  Discussed Laproscopic hysterectomy    Protocols used: INFORMATION ONLY CALL - NO TRIAGE-ADULT-      "

## 2020-12-14 ENCOUNTER — OFFICE VISIT (OUTPATIENT)
Dept: OBSTETRICS AND GYNECOLOGY | Facility: CLINIC | Age: 40
End: 2020-12-14

## 2020-12-14 VITALS
WEIGHT: 157 LBS | SYSTOLIC BLOOD PRESSURE: 132 MMHG | HEIGHT: 62 IN | DIASTOLIC BLOOD PRESSURE: 74 MMHG | BODY MASS INDEX: 28.89 KG/M2

## 2020-12-14 DIAGNOSIS — Z12.31 SCREENING MAMMOGRAM, ENCOUNTER FOR: ICD-10-CM

## 2020-12-14 DIAGNOSIS — Z01.419 WELL WOMAN EXAM WITH ROUTINE GYNECOLOGICAL EXAM: Primary | ICD-10-CM

## 2020-12-14 DIAGNOSIS — D21.9 FIBROIDS: ICD-10-CM

## 2020-12-14 PROCEDURE — 99396 PREV VISIT EST AGE 40-64: CPT | Performed by: OBSTETRICS & GYNECOLOGY

## 2020-12-14 RX ORDER — NORGESTIMATE AND ETHINYL ESTRADIOL 0.25-0.035
KIT ORAL
Qty: 28 TABLET | Refills: 12 | Status: SHIPPED | OUTPATIENT
Start: 2020-12-14 | End: 2021-03-30 | Stop reason: SDUPTHER

## 2020-12-14 NOTE — PROGRESS NOTES
Subjective   Danelle Estrada is a 40 y.o. female  YOB: 1980    Chief Complaint   Patient presents with   • Gynecologic Exam     PT is here for annual exam Pt is here for a follow up for fibroids.  PT is doing well no c/o        40-year-old female  0 para 0 last menstrual period 2020 presents for annual examination.  Patient reports overall she is doing well.  She denies any complaints.  She is currently on Sprintec for cycle regulation.  She reports her cycles are regular lasting approximately 4 days.  Her last Pap smear was in  was negative.  She denies any history of abnormal Pap smears previously.  Denies any changes to her medical or surgical history at this time.  She denies drinking smoking or drug use.  She is not currently sexually active.  She previously had BRCA testing which was negative.      No Known Allergies    Past Medical History:   Diagnosis Date   • Allergic    • Bipolar disorder (CMS/HCC)    • Depression    • H/O schizophrenia    • Hypertension        Family History   Problem Relation Age of Onset   • No Known Problems Father    • No Known Problems Mother    • No Known Problems Brother    • No Known Problems Sister    • Ovarian cancer Maternal Grandmother    • Breast cancer Paternal Aunt        Social History     Socioeconomic History   • Marital status: Single     Spouse name: Not on file   • Number of children: Not on file   • Years of education: Not on file   • Highest education level: Not on file   Tobacco Use   • Smoking status: Never Smoker   • Smokeless tobacco: Never Used   Substance and Sexual Activity   • Alcohol use: No   • Drug use: No     Comment: uknown   • Sexual activity: Yes     Partners: Male     Birth control/protection: OCP         Current Outpatient Medications:   •  acetaminophen (TYLENOL) 500 MG tablet, Take 500 mg by mouth Every 6 (Six) Hours As Needed for Mild Pain ., Disp: , Rfl:   •  acyclovir (ZOVIRAX) 400 MG tablet, Take 2  tablets by mouth 3 (Three) Times a Day. Take no more than 5 doses a day., Disp: 30 tablet, Rfl: 2  •  diphenhydrAMINE (BENADRYL) 25 mg capsule, Take 25 mg by mouth Every 6 (Six) Hours As Needed for Itching., Disp: , Rfl:   •  EPINEPHrine (EPIPEN) 0.3 MG/0.3ML solution auto-injector injection, , Disp: , Rfl:   •  lisinopril (PRINIVIL,ZESTRIL) 5 MG tablet, Take 5 mg by mouth Daily., Disp: , Rfl: 1  •  Multiple Vitamins-Minerals (MULTIVITAMIN ADULTS PO), Take  by mouth., Disp: , Rfl:   •  norgestimate-ethinyl estradiol (Sprintec 28) 0.25-35 MG-MCG per tablet, 1 po daily, Disp: 28 tablet, Rfl: 12  •  QUEtiapine (SEROquel) 200 MG tablet, Take 200 mg by mouth Every Night., Disp: , Rfl:   •  verapamil SR (CALAN-SR) 180 MG CR tablet, Take 180 mg by mouth Daily., Disp: , Rfl:     Patient's last menstrual period was 11/19/2020 (exact date).    Sexual History:         Could not be calculated    Past Surgical History:   Procedure Laterality Date   • EYE SURGERY     • RHINOPLASTY     • WISDOM TOOTH EXTRACTION         Review of Systems   Constitutional: Positive for unexpected weight gain (due to COVID). Negative for activity change and unexpected weight loss.   HENT: Negative for congestion.    Cardiovascular: Negative for chest pain.   Gastrointestinal: Negative for blood in stool, constipation and diarrhea.   Endocrine: Negative for cold intolerance and heat intolerance.   Genitourinary: Negative for dysuria, pelvic pain, urinary incontinence and vaginal discharge.   Musculoskeletal: Positive for back pain. Negative for arthralgias, neck pain and neck stiffness.   Skin: Negative for rash.   Neurological: Positive for headache (allergies). Negative for dizziness.   Psychiatric/Behavioral: Negative for sleep disturbance. The patient is not nervous/anxious.        Objective   Physical Exam  Vitals signs and nursing note reviewed. Exam conducted with a chaperone present.   Constitutional:       General: She is not in acute  "distress.     Appearance: She is well-developed.   HENT:      Head: Normocephalic and atraumatic.   Neck:      Musculoskeletal: Normal range of motion and neck supple.   Cardiovascular:      Rate and Rhythm: Normal rate and regular rhythm.      Heart sounds: No murmur.   Pulmonary:      Effort: Pulmonary effort is normal.      Breath sounds: Normal breath sounds.   Chest:      Breasts:         Right: No inverted nipple or mass.         Left: No inverted nipple or mass.   Abdominal:      General: There is no distension.      Palpations: Abdomen is soft.      Tenderness: There is no abdominal tenderness.   Genitourinary:     Exam position: Supine.      Labia:         Right: No tenderness or lesion.         Left: No tenderness or lesion.       Vagina: Normal. No vaginal discharge, tenderness or bleeding.      Cervix: No cervical motion tenderness, discharge or friability.      Adnexa:         Right: Mass and fullness present. No tenderness.          Left: No tenderness or fullness.        Comments: Fullness noted in right adnexa consistent with fibroids.   Musculoskeletal: Normal range of motion.   Skin:     General: Skin is warm and dry.   Neurological:      Mental Status: She is alert and oriented to person, place, and time.   Psychiatric:         Behavior: Behavior normal.         Judgment: Judgment normal.           Vitals:    12/14/20 1002   BP: 132/74   Weight: 71.2 kg (157 lb)   Height: 157.5 cm (62\")       Diagnoses and all orders for this visit:    1. Well woman exam with routine gynecological exam (Primary)  -     norgestimate-ethinyl estradiol (Sprintec 28) 0.25-35 MG-MCG per tablet; 1 po daily  Dispense: 28 tablet; Refill: 12    2. Screening mammogram, encounter for    3. Fibroids    Normal GYN exam. Will have lab work at her PCPs office. Encouraged SBE, pt is aware how to do self breast exam and the importance of same. Discussed weight management and importance of maintaining a healthy weight. Discussed " Vitamin D intake and the importance of adequate vitamin D for both Bone Health and a healthy immune system.  Discussed Daily exercise and the importance of same, in regards to a healthy heart as well as helping to maintain her weight and improving her mental health.  Mammogram will be scheduled at next year. She is up to date and had her mammogram earlier this month. Pap smear is not indicated at this time per ASCCP guidelines.  Will have patient return to clinic in 3 month for recheck on fibroid uterus.   All questions answered and patient verbalized understanding of plan.       Bre Jordan, DO

## 2021-03-30 ENCOUNTER — OFFICE VISIT (OUTPATIENT)
Dept: OBSTETRICS AND GYNECOLOGY | Facility: CLINIC | Age: 41
End: 2021-03-30

## 2021-03-30 VITALS
SYSTOLIC BLOOD PRESSURE: 120 MMHG | WEIGHT: 156 LBS | BODY MASS INDEX: 28.71 KG/M2 | HEIGHT: 62 IN | DIASTOLIC BLOOD PRESSURE: 70 MMHG

## 2021-03-30 DIAGNOSIS — D21.9 FIBROIDS: Primary | ICD-10-CM

## 2021-03-30 PROCEDURE — 99213 OFFICE O/P EST LOW 20 MIN: CPT | Performed by: OBSTETRICS & GYNECOLOGY

## 2021-03-30 RX ORDER — NORGESTIMATE AND ETHINYL ESTRADIOL 0.25-0.035
KIT ORAL
Qty: 28 TABLET | Refills: 12 | Status: SHIPPED | OUTPATIENT
Start: 2021-03-30 | End: 2021-03-30

## 2021-03-30 RX ORDER — NORGESTIMATE AND ETHINYL ESTRADIOL 0.25-0.035
KIT ORAL
Qty: 28 TABLET | Refills: 12 | Status: SHIPPED | OUTPATIENT
Start: 2021-03-30 | End: 2022-01-20 | Stop reason: SDUPTHER

## 2021-03-31 NOTE — PROGRESS NOTES
"Subjective   Danelle Estrada is a 40 y.o. female.     Chief Complaint   Patient presents with   • Follow-up     Pt  is here for follow up for fibroids. PT states she is having just lover back pain        40 year old female  Patient's last menstrual period was 2021 (exact date) presents for follow up on fibroids as well as menstrual management. Patient reports that she has no complaints at this time. She reports that her cycles are regular and more manageable. She reports minimal pain with her menstrual cycles. She previously had a TVUS that showed 7.8 cm with several fibroids. She denies smoking at this time. Overall she is happy with her current management.        Review of Systems   Genitourinary: Positive for menstrual problem and pelvic pain.       Objective   /70   Ht 157.5 cm (62\")   Wt 70.8 kg (156 lb)   LMP 2021 (Exact Date)   BMI 28.53 kg/m²   Patient's last menstrual period was 2021 (exact date).  Physical Exam  Vitals and nursing note reviewed.   Constitutional:       General: She is not in acute distress.     Appearance: She is well-developed.   HENT:      Head: Normocephalic and atraumatic.   Eyes:      General:         Right eye: No discharge.         Left eye: No discharge.      Conjunctiva/sclera: Conjunctivae normal.   Pulmonary:      Effort: Pulmonary effort is normal.   Musculoskeletal:         General: Normal range of motion.      Cervical back: Normal range of motion and neck supple.   Skin:     General: Skin is warm and dry.   Neurological:      Mental Status: She is alert and oriented to person, place, and time.   Psychiatric:         Behavior: Behavior normal.         Judgment: Judgment normal.       Assessment/Plan   Problems Addressed this Visit     None      Visit Diagnoses     Fibroids    -  Primary    Relevant Medications    norgestimate-ethinyl estradiol (Sprintec 28) 0.25-35 MG-MCG per tablet      Diagnoses       Codes Comments    Fibroids    -  Primary " ICD-10-CM: D21.9  ICD-9-CM: 215.9       TVUS revealed minimal change to fibroids at this time   Patient happy on her OCP   Will continue patient combined OCP at this time   RTC In 9 months for annual examination with TVUS or sooner if symptoms worsen.        Bre Jordan,

## 2021-05-26 ENCOUNTER — NURSE TRIAGE (OUTPATIENT)
Dept: CALL CENTER | Facility: HOSPITAL | Age: 41
End: 2021-05-26

## 2021-05-26 NOTE — TELEPHONE ENCOUNTER
"She was using new mascara, yesterday noted red dots in white of eye, no blurred vision or drainage, but eyes itching and eye lids and lashes itching, has flushed eyes, stopped mascra, and taken contacts out. Told her to be seen today by Urgent Care/ walk in clinic or eye Dr.    Reason for Disposition  • Eyelids are very swollen (shut or almost)    Additional Information  • Negative: Chemical gets into the eye from fingers, contaminated object, spray, or splash  • Negative: Eye pain  • Negative: Runny nose and sneezing also present  • Negative: Reaction to antibiotic eye drops  • Negative: Doesn't match SYMPTOMS of eye allergy  • Negative: [1] Sacs of clear  fluid (blisters) on whites of eyes AND [2] more than mild AND [3] not improved 2 hours after allergy treatment per protocol  • Negative: [1] Blurred vision AND [2] new or worsening  • Negative: Sacs of clear fluid (blisters) on whites of eyes or inner lids  • Negative: [1] Taking allergy medicine > 2 days AND [2] pus remains on eyelids.  • Negative: [1] Taking allergy medicine > 2 days AND [2] eyes are very itchy    Answer Assessment - Initial Assessment Questions  1. SEVERITY: \"How bad is the itching?\"  (e.g., Scale 1-10; mild, moderate or severe)      Itching 6  2. ONSET: \"When did the eye symptoms start?\" (e.g., hours or days ago)      yesterday  3. EYELIDS: \"Are the eyelids swollen?\" If so, ask: \"How much?\"      No itchy  4. EYE DISCHARGE: \"Is there any discharge from the eye, or eyelid crusting?\" If so, ask: \"How much?\"      no  5. TRIGGER: \"What do you think triggered the allergic reaction?\" (e.g., dust, smoke, pollen, new eye make-up)      Maybe mascara  6. RECURRENT PROBLEM: \"Have you experienced eye allergies before?\" If so, ask: \"When was the last time?\" and \"What medicine worked best in the past?\"      no  7. OTHER SYMPTOMS: \"Do you have any other symptoms?\" (e.g., runny nose)      itchy eyes and dots in eye  8. PREGNANCY: \"Is there any chance you are " "pregnant?\" \"When was your last menstrual period?\"      no    Protocols used: EYE - ALLERGY-ADULT-AH      "

## 2021-06-11 ENCOUNTER — LAB (OUTPATIENT)
Dept: LAB | Facility: HOSPITAL | Age: 41
End: 2021-06-11

## 2021-06-11 ENCOUNTER — TRANSCRIBE ORDERS (OUTPATIENT)
Dept: ADMINISTRATIVE | Facility: HOSPITAL | Age: 41
End: 2021-06-11

## 2021-06-11 DIAGNOSIS — I10 HYPERTENSION, UNSPECIFIED TYPE: ICD-10-CM

## 2021-06-11 DIAGNOSIS — I10 HYPERTENSION, UNSPECIFIED TYPE: Primary | ICD-10-CM

## 2021-06-11 LAB
ALBUMIN SERPL-MCNC: 4 G/DL (ref 3.5–5.2)
ALBUMIN/GLOB SERPL: 1.1 G/DL
ALP SERPL-CCNC: 80 U/L (ref 39–117)
ALT SERPL W P-5'-P-CCNC: 12 U/L (ref 1–33)
ANION GAP SERPL CALCULATED.3IONS-SCNC: 9.4 MMOL/L (ref 5–15)
AST SERPL-CCNC: 16 U/L (ref 1–32)
BASOPHILS # BLD AUTO: 0.06 10*3/MM3 (ref 0–0.2)
BASOPHILS NFR BLD AUTO: 1.1 % (ref 0–1.5)
BILIRUB SERPL-MCNC: 0.2 MG/DL (ref 0–1.2)
BUN SERPL-MCNC: 10 MG/DL (ref 6–20)
BUN/CREAT SERPL: 12.2 (ref 7–25)
CALCIUM SPEC-SCNC: 9.3 MG/DL (ref 8.6–10.5)
CHLORIDE SERPL-SCNC: 104 MMOL/L (ref 98–107)
CHOLEST SERPL-MCNC: 195 MG/DL (ref 0–200)
CO2 SERPL-SCNC: 22.6 MMOL/L (ref 22–29)
CREAT SERPL-MCNC: 0.82 MG/DL (ref 0.57–1)
DEPRECATED RDW RBC AUTO: 42.5 FL (ref 37–54)
EOSINOPHIL # BLD AUTO: 0.06 10*3/MM3 (ref 0–0.4)
EOSINOPHIL NFR BLD AUTO: 1.1 % (ref 0.3–6.2)
ERYTHROCYTE [DISTWIDTH] IN BLOOD BY AUTOMATED COUNT: 12.9 % (ref 12.3–15.4)
GFR SERPL CREATININE-BSD FRML MDRD: 93 ML/MIN/1.73
GLOBULIN UR ELPH-MCNC: 3.5 GM/DL
GLUCOSE SERPL-MCNC: 78 MG/DL (ref 65–99)
HCT VFR BLD AUTO: 43.6 % (ref 34–46.6)
HDLC SERPL-MCNC: 70 MG/DL (ref 40–60)
HGB BLD-MCNC: 14.6 G/DL (ref 12–15.9)
IMM GRANULOCYTES # BLD AUTO: 0.01 10*3/MM3 (ref 0–0.05)
IMM GRANULOCYTES NFR BLD AUTO: 0.2 % (ref 0–0.5)
LDLC SERPL CALC-MCNC: 106 MG/DL (ref 0–100)
LDLC/HDLC SERPL: 1.49 {RATIO}
LYMPHOCYTES # BLD AUTO: 1.91 10*3/MM3 (ref 0.7–3.1)
LYMPHOCYTES NFR BLD AUTO: 35.2 % (ref 19.6–45.3)
MCH RBC QN AUTO: 29.9 PG (ref 26.6–33)
MCHC RBC AUTO-ENTMCNC: 33.5 G/DL (ref 31.5–35.7)
MCV RBC AUTO: 89.2 FL (ref 79–97)
MONOCYTES # BLD AUTO: 0.58 10*3/MM3 (ref 0.1–0.9)
MONOCYTES NFR BLD AUTO: 10.7 % (ref 5–12)
NEUTROPHILS NFR BLD AUTO: 2.8 10*3/MM3 (ref 1.7–7)
NEUTROPHILS NFR BLD AUTO: 51.7 % (ref 42.7–76)
NRBC BLD AUTO-RTO: 0 /100 WBC (ref 0–0.2)
PLATELET # BLD AUTO: 315 10*3/MM3 (ref 140–450)
PMV BLD AUTO: 9.4 FL (ref 6–12)
POTASSIUM SERPL-SCNC: 4.1 MMOL/L (ref 3.5–5.2)
PROT SERPL-MCNC: 7.5 G/DL (ref 6–8.5)
RBC # BLD AUTO: 4.89 10*6/MM3 (ref 3.77–5.28)
SODIUM SERPL-SCNC: 136 MMOL/L (ref 136–145)
T4 FREE SERPL-MCNC: 1.03 NG/DL (ref 0.93–1.7)
TRIGL SERPL-MCNC: 105 MG/DL (ref 0–150)
VLDLC SERPL-MCNC: 19 MG/DL (ref 5–40)
WBC # BLD AUTO: 5.42 10*3/MM3 (ref 3.4–10.8)

## 2021-06-11 PROCEDURE — 85025 COMPLETE CBC W/AUTO DIFF WBC: CPT

## 2021-06-11 PROCEDURE — 80061 LIPID PANEL: CPT

## 2021-06-11 PROCEDURE — 84439 ASSAY OF FREE THYROXINE: CPT

## 2021-06-11 PROCEDURE — 36415 COLL VENOUS BLD VENIPUNCTURE: CPT

## 2021-06-11 PROCEDURE — 80053 COMPREHEN METABOLIC PANEL: CPT

## 2021-10-22 ENCOUNTER — PATIENT ROUNDING (BHMG ONLY) (OUTPATIENT)
Dept: INTERNAL MEDICINE | Facility: CLINIC | Age: 41
End: 2021-10-22

## 2021-10-22 ENCOUNTER — OFFICE VISIT (OUTPATIENT)
Dept: INTERNAL MEDICINE | Facility: CLINIC | Age: 41
End: 2021-10-22

## 2021-10-22 VITALS
BODY MASS INDEX: 30.73 KG/M2 | HEART RATE: 91 BPM | DIASTOLIC BLOOD PRESSURE: 70 MMHG | RESPIRATION RATE: 16 BRPM | WEIGHT: 167 LBS | SYSTOLIC BLOOD PRESSURE: 116 MMHG | TEMPERATURE: 97.5 F | OXYGEN SATURATION: 100 % | HEIGHT: 62 IN

## 2021-10-22 DIAGNOSIS — M54.50 CHRONIC MIDLINE LOW BACK PAIN WITHOUT SCIATICA: ICD-10-CM

## 2021-10-22 DIAGNOSIS — G89.29 CHRONIC MIDLINE LOW BACK PAIN WITHOUT SCIATICA: ICD-10-CM

## 2021-10-22 DIAGNOSIS — I10 ESSENTIAL HYPERTENSION: Primary | ICD-10-CM

## 2021-10-22 PROBLEM — F20.9 SCHIZOPHRENIA: Status: ACTIVE | Noted: 2021-10-22

## 2021-10-22 PROCEDURE — 99213 OFFICE O/P EST LOW 20 MIN: CPT | Performed by: INTERNAL MEDICINE

## 2021-10-22 RX ORDER — LISINOPRIL 5 MG/1
5 TABLET ORAL DAILY
Qty: 90 TABLET | Refills: 1 | Status: SHIPPED | OUTPATIENT
Start: 2021-10-22 | End: 2022-02-24 | Stop reason: SDUPTHER

## 2021-10-22 NOTE — PROGRESS NOTES
October 22, 2021    Hello, may I speak with Danelle sEtrada?    My name is CORAL     I am  with [facility]  [address], [address]  [place] [address] [phone].    Before we get started may I verify your date of birth? 1980    I am calling to officially welcome you to our practice and ask about your recent visit. Is this a good time to talk? yes    Tell me about your visit with us. What things went well?  ANSWERED ALL MY QUESTIONS AND WAS VERY NICE       We're always looking for ways to make our patients' experiences even better. Do you have recommendations on ways we may improve?  no    Overall were you satisfied with your first visit to our practice? yes       I appreciate you taking the time to speak with me today. Is there anything else I can do for you? no      Thank you, and have a great day.

## 2021-10-22 NOTE — PROGRESS NOTES
Chief Complaint   Patient presents with   • Establish Care   • Back Pain     Wanting cortisone shot         History:  Danelle Estrada is a 41 y.o. female who presents today for evaluation of the above problems.      She presents today to establish care. She has a past medical history for essential hypertension, bipolar disorder, depression and schizophrenia. She currently takes verapamil 180 mg daily for hypertension. She is also on Seroquel 20 mg nightly, and acyclovir 800 mg 3 times a day.    I reviewed her most recent blood work from June 11, 2021. Her CMP was unremarkable. Her total cholesterol was 195 with HDL of 70 and LDL of 106.    Currently, she is having back pain and is requesting back injection.  She reports having lower back pain for 2 or 3 years.  Stretching and back brace help her symptoms.  Tylenol also helps.  Symptoms seem to be worse earlier in the morning.  She reports having 2 MRIs at [facility] previously.  I do not have these results, but she reports them being normal.  She denies any weakness or numbness in her legs.  There is no bladder or stool incontinence.  Denies any saddle anesthesia.    She is recently developed lactose intolerance    She takes Seroquel 200 mg daily for schizophrenia    She has an EpiPen for anaphylactic reaction to unknown substance.  This happened last year in which her lips became very edematous and her throat started closing up making it difficult for her to breathe.  She used her EpiPen and symptoms resolved.      It should be noted she has been on lisinopril for some time.  She also takes verapamil 180 mg daily for hypertension.    HPI     Social History     Socioeconomic History   • Marital status: Single   Tobacco Use   • Smoking status: Never Smoker   • Smokeless tobacco: Never Used   Vaping Use   • Vaping Use: Unknown   Substance and Sexual Activity   • Alcohol use: No   • Drug use: No     Comment: uknown   • Sexual activity: Yes     Partners: Male     Birth  control/protection: OCP     ROS:  Review of Systems   Constitutional: Negative for fatigue and fever.   Respiratory: Negative for cough and shortness of breath.    Cardiovascular: Negative.    Gastrointestinal: Negative.    Musculoskeletal: Positive for back pain. Negative for gait problem.   Neurological: Negative for weakness and numbness.         Current Outpatient Medications:   •  acetaminophen (TYLENOL) 500 MG tablet, Take 500 mg by mouth Every 6 (Six) Hours As Needed for Mild Pain ., Disp: , Rfl:   •  acyclovir (ZOVIRAX) 400 MG tablet, Take 2 tablets by mouth 3 (Three) Times a Day. Take no more than 5 doses a day., Disp: 30 tablet, Rfl: 2  •  diphenhydrAMINE (BENADRYL) 25 mg capsule, Take 25 mg by mouth Every 6 (Six) Hours As Needed for Itching., Disp: , Rfl:   •  EPINEPHrine (EPIPEN) 0.3 MG/0.3ML solution auto-injector injection, , Disp: , Rfl:   •  lisinopril (PRINIVIL,ZESTRIL) 5 MG tablet, Take 1 tablet by mouth Daily., Disp: 90 tablet, Rfl: 1  •  Multiple Vitamins-Minerals (MULTIVITAMIN ADULTS PO), Take  by mouth., Disp: , Rfl:   •  norgestimate-ethinyl estradiol (Sprintec 28) 0.25-35 MG-MCG per tablet, 1 po daily, Disp: 28 tablet, Rfl: 12  •  QUEtiapine (SEROquel) 200 MG tablet, Take 200 mg by mouth Every Night., Disp: , Rfl:   •  verapamil SR (CALAN-SR) 180 MG CR tablet, Take 180 mg by mouth Daily., Disp: , Rfl:     Lab Results   Component Value Date    GLUCOSE 78 06/11/2021    BUN 10 06/11/2021    CREATININE 0.82 06/11/2021    EGFRIFAFRI 93 06/11/2021    BCR 12.2 06/11/2021    K 4.1 06/11/2021    CO2 22.6 06/11/2021    CALCIUM 9.3 06/11/2021    ALBUMIN 4.00 06/11/2021    AST 16 06/11/2021    ALT 12 06/11/2021       WBC   Date Value Ref Range Status   06/11/2021 5.42 3.40 - 10.80 10*3/mm3 Final     RBC   Date Value Ref Range Status   06/11/2021 4.89 3.77 - 5.28 10*6/mm3 Final     Hemoglobin   Date Value Ref Range Status   06/11/2021 14.6 12.0 - 15.9 g/dL Final     Hematocrit   Date Value Ref Range  Status   06/11/2021 43.6 34.0 - 46.6 % Final     MCV   Date Value Ref Range Status   06/11/2021 89.2 79.0 - 97.0 fL Final     MCH   Date Value Ref Range Status   06/11/2021 29.9 26.6 - 33.0 pg Final     MCHC   Date Value Ref Range Status   06/11/2021 33.5 31.5 - 35.7 g/dL Final     RDW   Date Value Ref Range Status   06/11/2021 12.9 12.3 - 15.4 % Final     RDW-SD   Date Value Ref Range Status   06/11/2021 42.5 37.0 - 54.0 fl Final     MPV   Date Value Ref Range Status   06/11/2021 9.4 6.0 - 12.0 fL Final     Platelets   Date Value Ref Range Status   06/11/2021 315 140 - 450 10*3/mm3 Final     Neutrophil %   Date Value Ref Range Status   06/11/2021 51.7 42.7 - 76.0 % Final     Lymphocyte %   Date Value Ref Range Status   06/11/2021 35.2 19.6 - 45.3 % Final     Monocyte %   Date Value Ref Range Status   06/11/2021 10.7 5.0 - 12.0 % Final     Eosinophil %   Date Value Ref Range Status   06/11/2021 1.1 0.3 - 6.2 % Final     Basophil %   Date Value Ref Range Status   06/11/2021 1.1 0.0 - 1.5 % Final     Immature Grans %   Date Value Ref Range Status   06/11/2021 0.2 0.0 - 0.5 % Final     Neutrophils, Absolute   Date Value Ref Range Status   06/11/2021 2.80 1.70 - 7.00 10*3/mm3 Final     Lymphocytes, Absolute   Date Value Ref Range Status   06/11/2021 1.91 0.70 - 3.10 10*3/mm3 Final     Monocytes, Absolute   Date Value Ref Range Status   06/11/2021 0.58 0.10 - 0.90 10*3/mm3 Final     Eosinophils, Absolute   Date Value Ref Range Status   06/11/2021 0.06 0.00 - 0.40 10*3/mm3 Final     Basophils, Absolute   Date Value Ref Range Status   06/11/2021 0.06 0.00 - 0.20 10*3/mm3 Final     Immature Grans, Absolute   Date Value Ref Range Status   06/11/2021 0.01 0.00 - 0.05 10*3/mm3 Final     nRBC   Date Value Ref Range Status   06/11/2021 0.0 0.0 - 0.2 /100 WBC Final         OBJECTIVE:  Visit Vitals  /70 (BP Location: Left arm, Patient Position: Sitting, Cuff Size: Adult)   Pulse 91   Temp 97.5 °F (36.4 °C) (Oral)   Resp 16  "  Ht 157.5 cm (62.01\")   Wt 75.8 kg (167 lb)   SpO2 100%   BMI 30.54 kg/m²      Physical Exam  Vitals reviewed.   Constitutional:       General: She is not in acute distress.     Appearance: Normal appearance. She is well-developed.   HENT:      Head: Normocephalic and atraumatic.      Mouth/Throat:      Pharynx: No oropharyngeal exudate.   Eyes:      General: No scleral icterus.     Conjunctiva/sclera: Conjunctivae normal.      Pupils: Pupils are equal, round, and reactive to light.   Neck:      Thyroid: No thyromegaly.      Vascular: No JVD.   Cardiovascular:      Rate and Rhythm: Normal rate and regular rhythm.      Heart sounds: Normal heart sounds. No murmur heard.  No friction rub. No gallop.    Pulmonary:      Effort: Pulmonary effort is normal. No respiratory distress.      Breath sounds: Normal breath sounds. No stridor. No wheezing, rhonchi or rales.   Abdominal:      General: Bowel sounds are normal. There is no distension.      Palpations: Abdomen is soft.      Tenderness: There is no abdominal tenderness.   Skin:     General: Skin is warm and dry.      Coloration: Skin is not jaundiced.   Neurological:      Mental Status: She is alert.      Cranial Nerves: No cranial nerve deficit.   Psychiatric:         Mood and Affect: Mood normal.         Behavior: Behavior normal.         Thought Content: Thought content normal.         Judgment: Judgment normal.         Assessment/Plan    Diagnoses and all orders for this visit:    1. Essential hypertension (Primary)  -     lisinopril (PRINIVIL,ZESTRIL) 5 MG tablet; Take 1 tablet by mouth Daily.  Dispense: 90 tablet; Refill: 1    2. Chronic midline low back pain without sciatica  -     Ambulatory Referral to Orthopedic Surgery      Will try get records from [facility] regarding her 2 lumbar MRIs.  We will try to get the discs as well.      Refill her lisinopril for hypertension.  I would not make any changes at this time as her blood pressure is well " controlled.    She has had 3 Moderna COVID-19 vaccines.  She is also had the influenza vaccine this season.    Follow-up in 4 months for Medicare wellness visit or sooner if needed.    Return in about 4 months (around 2/22/2022) for Medicare Wellness.      YVETTE Kessler MD  10:37 CDT  10/22/2021

## 2021-10-25 ENCOUNTER — TELEPHONE (OUTPATIENT)
Dept: INTERNAL MEDICINE | Facility: CLINIC | Age: 41
End: 2021-10-25

## 2021-10-25 NOTE — TELEPHONE ENCOUNTER
PATIENT HAS BEEN CALLED, SHE STATED THAT THE [facility] STATED THAT SHE NEEDED AN MRI FOR HER BACK.  PATIENT IS ASKING FOR AN ORDER TO BE FAXED TO [facility].

## 2021-10-25 NOTE — TELEPHONE ENCOUNTER
Caller: Danelle Estrada    Relationship: Self    Best call back number: [phone]    What orders are you requesting (i.e. lab or imaging): MRI    In what timeframe would the patient need to come in: ASAP    Where will you receive your lab/imaging services: [facility]

## 2021-10-25 NOTE — TELEPHONE ENCOUNTER
SPOKE WITH BRIDGET, THE LADY STATED THAT MRS TATE HAD SOME LUMBAR XRAYS BUT SHE DID NOT SEE AN MRI FROM [facility].  SHE TOOK MY NAME, PHONE, FAX AND WILL CALL BACK IF SHE FINDS MRI'S.

## 2021-10-26 DIAGNOSIS — M54.50 CHRONIC MIDLINE LOW BACK PAIN WITHOUT SCIATICA: Primary | ICD-10-CM

## 2021-10-26 DIAGNOSIS — G89.29 CHRONIC MIDLINE LOW BACK PAIN WITHOUT SCIATICA: Primary | ICD-10-CM

## 2021-10-26 DIAGNOSIS — M51.36 DDD (DEGENERATIVE DISC DISEASE), LUMBAR: ICD-10-CM

## 2021-10-26 NOTE — TELEPHONE ENCOUNTER
PATIENT HAS BEEN CALLED, GIVEN MESSAGE REGARDING MRI BEING ORDERED.  PATIENT REQUESTED THAT HER MRI ORDER BE FAXED TO [facility] AT  FAX  #  [phone]

## 2021-11-09 ENCOUNTER — TELEPHONE (OUTPATIENT)
Dept: INTERNAL MEDICINE | Facility: CLINIC | Age: 41
End: 2021-11-09

## 2021-11-09 NOTE — TELEPHONE ENCOUNTER
I sent in an appeal for this yesterday. I am not sure if it has been approved or not yet. Kinza would need to be the one to ask.

## 2021-11-09 NOTE — TELEPHONE ENCOUNTER
DANIEL FROM [facility] IS CALLING AND CHECKING ON THE PRECERT FOR MRI OF HER L-SPINE.  PLEASE CALL [phone] OR [phone]

## 2021-11-10 NOTE — TELEPHONE ENCOUNTER
PER DARREN,  STILL WAITING ON AUTHORIZATION.  DR DAVIS HAS PAPER WORK THAT NEEDS TO BE FILLED OUT AND SENT BACK IN.

## 2021-11-19 DIAGNOSIS — M54.50 CHRONIC MIDLINE LOW BACK PAIN WITHOUT SCIATICA: Primary | ICD-10-CM

## 2021-11-19 DIAGNOSIS — M51.36 DDD (DEGENERATIVE DISC DISEASE), LUMBAR: ICD-10-CM

## 2021-11-19 DIAGNOSIS — G89.29 CHRONIC MIDLINE LOW BACK PAIN WITHOUT SCIATICA: Primary | ICD-10-CM

## 2021-11-22 ENCOUNTER — TELEPHONE (OUTPATIENT)
Dept: INTERNAL MEDICINE | Facility: CLINIC | Age: 41
End: 2021-11-22

## 2021-11-22 NOTE — TELEPHONE ENCOUNTER
Spoke with patient to let her know what was going on. I told her that the referral was placed on Friday and they should be calling her Tuesday or Wednesday to get scheduled. She noted understanding

## 2021-11-22 NOTE — TELEPHONE ENCOUNTER
Caller: Danelle Estrada    Relationship to patient: Self    Best call back number: [phone] (H)     Patient is needing:  Pt it would like info regarding referral to PT. Please call w/ info.

## 2021-12-16 ENCOUNTER — TREATMENT (OUTPATIENT)
Dept: PHYSICAL THERAPY | Facility: CLINIC | Age: 41
End: 2021-12-16

## 2021-12-16 DIAGNOSIS — G89.29 CHRONIC MIDLINE LOW BACK PAIN WITHOUT SCIATICA: Primary | ICD-10-CM

## 2021-12-16 DIAGNOSIS — M54.50 CHRONIC MIDLINE LOW BACK PAIN WITHOUT SCIATICA: Primary | ICD-10-CM

## 2021-12-16 DIAGNOSIS — Z74.09 IMPAIRED MOBILITY: ICD-10-CM

## 2021-12-16 PROCEDURE — 97161 PT EVAL LOW COMPLEX 20 MIN: CPT | Performed by: PHYSICAL THERAPIST

## 2021-12-16 NOTE — PROGRESS NOTES
Physical Therapy Initial Evaluation and Plan of Care    Patient: Danelle Estrada               : 1980  Visit Date: 2021  Referring practitioner: OLIVER Kessler MD  Date of Initial Visit: 2021  Patient seen for 1 sessions    Visit Diagnoses:    ICD-10-CM ICD-9-CM   1. Chronic midline low back pain without sciatica  M54.50 724.2    G89.29 338.29   2. Impaired mobility  Z74.09 799.89     Past Medical History:   Diagnosis Date   • Allergic    • Bipolar disorder (HCC)    • Depression    • H/O schizophrenia    • Hypertension      Past Surgical History:   Procedure Laterality Date   • EYE SURGERY     • RHINOPLASTY     • WISDOM TOOTH EXTRACTION           SUBJECTIVE     Subjective Evaluation    History of Present Illness    Subjective comment: Pt reports that she has been dealing with low back pain over the past couple of years. She gets occassional symptoms down the LE but most of the pain his midline of thew lower back. Pain  Current pain ratin  At best pain ratin  At worst pain ratin  Location: low back   Quality: sharp and knife-like  Relieving factors: medications (back brace and stretching)  Aggravating factors: squatting, lifting, prolonged positioning, standing and ambulation (household chores)    Social Support  Lives in: apartment (2nd floor of apartment)  Lives with: alone    Hand dominance: right    Diagnostic Tests  MRI studies: normal    Patient Goals  Patient goals for therapy: decreased pain and independence with ADLs/IADLs         Outcome Measure:   PT G-Codes  Outcome Measure Options: Keyur Dominique  Modified Oswestry Score/Comments:     OBJECTIVE     Objective          Neurological Testing     Sensation     Lumbar   Left   Intact: light touch    Right   Intact: light touch    Reflexes   Left   Patellar (L4): normal (2+)  Achilles (S1): normal (2+)    Right   Patellar (L4): normal (2+)  Achilles (S1): normal  (2+)    Active Range of Motion     Additional Active Range of Motion Details  Flexion WFL, extension 50% limited and painful, rotation painful and limited bilateral R>L, lateral flexion ROM WFL but painful    Passive Range of Motion   Left Hip   Normal passive range of motion    Right Hip   Normal passive range of motion    Additional Passive Range of Motion Details  Pain with L ER, R hip in all planes    Strength/Myotome Testing     Left Hip   Planes of Motion   Flexion: 5  Extension: 4- (painful)  Abduction: 4+    Isolated Muscles   Gluteus erika: 4-    Right Hip   Planes of Motion   Flexion: 5  Extension: 4- (painful)  Abduction: 3    Isolated Muscles   Gluteus maximums: 4-    Left Knee   Flexion: 5  Extension: 5    Right Knee   Flexion: 5  Extension: 5    Left Ankle/Foot   Dorsiflexion: 5  Plantar flexion: 5    Right Ankle/Foot   Dorsiflexion: 5  Plantar flexion: 5    Tests       Thoracic   Negative slump.     Lumbar     Left   Negative crossed SLR and passive SLR.     Right   Negative crossed SLR and passive SLR.       Therapy Education/Self Care 46293   Details: Educated pt on anatomy, PT POC and HEP.   Given Home Exercise Program  symptom relief   Progress: New   Education provided to:  Patient   Level of understanding Verbalized   Timed Minutes      Access Code: [email or web address]  URL: [email or web address]  Date: 12/16/2021  Prepared by: Cameron Schneider    Exercises  Hook Lying Single Knee to Chest Stretch with Towel - 1 x daily - 7 x weekly - 1 sets - 3 reps - 30 sec hold  Supine Lower Trunk Rotation - 1 x daily - 7 x weekly - 2 sets - 10 reps  Bent Knee Fallouts with Alternating Legs - 1 x daily - 7 x weekly - 2 sets - 10 reps      Total Timed Treatment:     0   mins  Total Time of Visit:            45   mins    ASSESSMENT/PLAN     GOALS:  Goals                                                  Progress Note: 1/15/22                                                               Re-Cert due: 3/15/22    STG by:  3 weeks Comments Status Date   Pt to demonstrate decreased lumbar paraspinal muscle guarding upon palpation.                  LTG by: 6 weeks      Pt to demonstrate independence with comprehensive HEP.      Pt to demonstrate B hip abductor strength of 4+/5.      Pt to demonstrate B hip extensor strength of 4+/5.      Pt demonstrates pain free lumbar ROM in all planes.       Pt to score < 10/50 on the ADELIA.              Assessment & Plan     Assessment  Impairments: abnormal or restricted ROM, activity intolerance, impaired physical strength, lacks appropriate home exercise program and pain with function  Functional Limitations: lifting, walking, pulling, pushing, uncomfortable because of pain, standing, stooping and unable to perform repetitive tasks  Assessment details: Ms. Estrada presents to the clinic this date with a chief complaint of chronic midline low back pain that has been ongoing for the past 2 years. She reports that she has had two MRIs with no abnormalities noted. Her pain limits her ability to complete household chores. Upon evaluation she demonstrates limited lumbar extension and rotation with pain in both planes. Neurotension testing was negative. Muscle guarding was present in the lumbar spine along with significant hip weakness on the R side. The combination of tightness and weakness lead to increased strain on the lumbar spine. Skilled physical therapy is indicated to address these deficits and improve her functional mobility. Thank you for this referral.  Prognosis: good    Plan  Therapy options: will be seen for skilled therapy services  Planned modality interventions: dry needling, TENS and low level laser therapy  Planned therapy interventions: manual therapy, motor coordination training, neuromuscular re-education, balance/weight-bearing training, body mechanics training, postural training, soft tissue mobilization, spinal/joint mobilization, flexibility, functional ROM exercises,  strengthening, stretching, gait training, home exercise program, therapeutic activities and joint mobilization  Duration in weeks: 12  Treatment plan discussed with: patient  Plan details: We will initially work on her soft tissue restrictions, flexibility and spinal mobility. A progression will be made with an emphasis on hip and core strength to decrease the load on the lumbar spine. An HEP will be developed working towards independence upon discharge.           SIGNATURE: Cameron Schneider PT DPT, License #: [id number]  Electronically Signed on 12/16/2021    Initial Certification  Certification Period: 12/16/2021 through 3/15/2022  I certify that the therapy services are furnished while this patient is under my care.  The services outlined above are required by this patient, and will be reviewed every 90 days.     PHYSICIAN:     OLIVER Kessler MD______________________________________DATE: _________     Please sign and return via fax to [phone].   Thank you so much for letting us work with Danelle. I appreciate your letting us work with your patients. If you have any questions or concerns, please don't hesitate to contact me.          [address] [address]  [phone]

## 2022-01-05 ENCOUNTER — TREATMENT (OUTPATIENT)
Dept: PHYSICAL THERAPY | Facility: CLINIC | Age: 42
End: 2022-01-05

## 2022-01-05 DIAGNOSIS — M54.50 CHRONIC MIDLINE LOW BACK PAIN WITHOUT SCIATICA: Primary | ICD-10-CM

## 2022-01-05 DIAGNOSIS — Z74.09 IMPAIRED MOBILITY: ICD-10-CM

## 2022-01-05 DIAGNOSIS — G89.29 CHRONIC MIDLINE LOW BACK PAIN WITHOUT SCIATICA: Primary | ICD-10-CM

## 2022-01-05 PROCEDURE — 97140 MANUAL THERAPY 1/> REGIONS: CPT | Performed by: PHYSICAL THERAPIST

## 2022-01-05 PROCEDURE — 97110 THERAPEUTIC EXERCISES: CPT | Performed by: PHYSICAL THERAPIST

## 2022-01-05 NOTE — PROGRESS NOTES
Physical Therapy Treatment Note    Patient: Danelle Estrada                                                                     Visit Date: 2022  :     1980    Referring practitioner:    OLIVER Kessler MD  Date of Initial Visit:          Type: THERAPY  Noted: 2021    Patient seen for 2 sessions    Visit Diagnoses:    ICD-10-CM ICD-9-CM   1. Chronic midline low back pain without sciatica  M54.50 724.2    G89.29 338.29   2. Impaired mobility  Z74.09 799.89     SUBJECTIVE     Subjective Pt continues to report low back pain with no changes since her last visit.    PAIN: 6/10         OBJECTIVE     Objective      Manual Therapy     12065  Comments   lumbar STM with free up prone       Timed Minutes 15        Therapeutic Exercises    [id number] Comments   PPT  2 x 10   BKFO with RTB 2 x 10   Attempted bridges  Stopped due to pain   Clamshells with RTB 2 x 10 each side    HL SB press 2 x 10   B hip flexion/extension with feet on 55 cm SB 1 min x 2 reps   HL TA contraction with marching  2 x 10   B shuttle press 6 bands  2 min    SL shuttle press 5 bands 1 min each leg x 2       Timed Minutes 25     Therapy Education/Self Care 10548   Details: Educated pt on anatomy, PT POC and HEP.   Given Home Exercise Program  symptom relief   Progress: New   Education provided to:  Patient   Level of understanding Verbalized   Timed Minutes        Access Code: [email or web address]  URL: [email or web address]  Date: 2021  Prepared by: Cameron Schneider     Exercises  Hook Lying Single Knee to Chest Stretch with Towel - 1 x daily - 7 x weekly - 1 sets - 3 reps - 30 sec hold  Supine Lower Trunk Rotation - 1 x daily - 7 x weekly - 2 sets - 10 reps  Bent Knee Fallouts with Alternating Legs - 1 x daily - 7 x weekly - 2 sets - 10 reps        Total Timed Treatment:    40    mins  Total Time of Visit:             40   mins         ASSESSMENT/PLAN     GOALS  Goals                                                   Progress Note: 1/15/22                                                               Re-Cert due: 3/15/22     STG by: 3 weeks Comments Status Date   Pt to demonstrate decreased lumbar paraspinal muscle guarding upon palpation.  guarded throughout   ongoing   1/5                       LTG by: 6 weeks         Pt to demonstrate independence with comprehensive HEP.         Pt to demonstrate B hip abductor strength of 4+/5.         Pt to demonstrate B hip extensor strength of 4+/5.         Pt demonstrates pain free lumbar ROM in all planes.          Pt to score < 10/50 on the ADELIA.                       Assessment/Plan     ASSESSMENT: Today was her first session since the initial eval so no goals have been met at this time. There was mild guarding present but emphasis was on more neutral alignment of the pelvis as she demonstrates significant anterior tilt. She did well with hip and core strengthening exercises with cues occasional required to address mechanics. The R side was slightly weaker and fatigued faster than the L.     PLAN: Assess response to today's session continue to work on neutral posture along with hip and core strengthening.    SIGNATURE: Cameron Schneider PT DPT, License #: [id number]  Electronically Signed on 1/5/2022        [address] [place] [address]. [address]  [phone]

## 2022-01-11 PROCEDURE — U0004 COV-19 TEST NON-CDC HGH THRU: HCPCS | Performed by: NURSE PRACTITIONER

## 2022-01-11 PROCEDURE — 87086 URINE CULTURE/COLONY COUNT: CPT | Performed by: NURSE PRACTITIONER

## 2022-01-12 ENCOUNTER — TREATMENT (OUTPATIENT)
Dept: PHYSICAL THERAPY | Facility: CLINIC | Age: 42
End: 2022-01-12

## 2022-01-12 DIAGNOSIS — M54.50 CHRONIC MIDLINE LOW BACK PAIN WITHOUT SCIATICA: Primary | ICD-10-CM

## 2022-01-12 DIAGNOSIS — Z74.09 IMPAIRED MOBILITY: ICD-10-CM

## 2022-01-12 DIAGNOSIS — G89.29 CHRONIC MIDLINE LOW BACK PAIN WITHOUT SCIATICA: Primary | ICD-10-CM

## 2022-01-12 PROCEDURE — 97140 MANUAL THERAPY 1/> REGIONS: CPT | Performed by: PHYSICAL THERAPIST

## 2022-01-12 PROCEDURE — 97110 THERAPEUTIC EXERCISES: CPT | Performed by: PHYSICAL THERAPIST

## 2022-01-12 NOTE — PROGRESS NOTES
Physical Therapy Treatment Note    Patient: Danelle Estrada                                                                     Visit Date: 2022  :     1980    Referring practitioner:    OLIVER Kessler MD  Date of Initial Visit:          Type: THERAPY  Noted: 2021    Patient seen for 3 sessions    Visit Diagnoses:    ICD-10-CM ICD-9-CM   1. Chronic midline low back pain without sciatica  M54.50 724.2    G89.29 338.29   2. Impaired mobility  Z74.09 799.89     SUBJECTIVE     Subjective She still has pain but only when she exercises. She bought a ball at [employer] and it doesn't hurt as much. After she does her HEP, it will improve but still has pain. When she sleeps and lays in 1 position, her pain will be 10/10 but during the day, it stays around a 6-7/10. While performing HEP, her pain is an 8/10.     PAIN: 7/10 < 5/10     OBJECTIVE     Objective      Manual Therapy     44039  Comments   STM w/ massage cream to B lumbar paraspinals Massage chair        Timed Minutes 10     Therapeutic Exercises    [id number] Comments   Seated PPT on SB  2x10: required tactile cueing   Seated march on SB  2x10   Seated modified payloff press on SB  2x10   B standing hip abd 2x10   B standing hip ext 2x10   B LE heel slides on SB w/ B UE flex/ext w/ 1# wand 2x10   HL isometric SB press 2x10: required tactile cueing   Reviewed BKFO    Reviewed LTR    Reviewed SKTC w/ towel     Timed Minutes 30     Therapy Education/Self Care 25972   Details: Re-printed and reviewed HEP today.    Given Home Exercise Program  symptom relief   Progress: New   Education provided to:  Patient   Level of understanding Verbalized   Timed Minutes        Access Code: [email or web address]  URL: [email or web address]  Date: 2021  Prepared by: Cameron Schneider     Exercises  Hook Lying Single Knee to Chest Stretch with Towel - 1 x daily - 7 x weekly - 1 sets - 3 reps - 30 sec hold  Supine  "Lower Trunk Rotation - 1 x daily - 7 x weekly - 2 sets - 10 reps  Bent Knee Fallouts with Alternating Legs - 1 x daily - 7 x weekly - 2 sets - 10 reps    Total Timed Treatment:    40   mins  Total Time of Visit:             45   mins         ASSESSMENT/PLAN     GOALS  Goals                                                  Progress Note: 1/15/22                                                               Re-Cert due: 3/15/22     STG by: 3 weeks Comments Status Date   Pt to demonstrate decreased lumbar paraspinal muscle guarding upon palpation.  guarded throughout   ongoing   1/5   LTG by: 6 weeks         Pt to demonstrate independence with comprehensive HEP. Re-printed and reviewed her HEP   1/12    Pt to demonstrate B hip abductor strength of 4+/5.         Pt to demonstrate B hip extensor strength of 4+/5.         Pt demonstrates pain free lumbar ROM in all planes.          Pt to score < 10/50 on the ADELIA.           Assessment/Plan     ASSESSMENT: We progressed hip and core strengthening conservatively today. Previous session she did not alert therapist when exercises were painful as she reported, \"she is just doing therapy until she can get her injections\". She was provided education about the benefits of therapy and how injections can help with the symptoms but don't resolve the root cause. She was also encouraged to communicate if anything if painful while here at the clinic as this could be detrimental to her progression. She had a difficult time with PPT and activating her core in general without increasing her lumbar lordosis. She did respond well to the massage chair and reported improved symptoms post-session.     PLAN: Consider modifying her HEP if she responded well to today's session. Prioritize frequent positional changes and focus on core strength and PPT ability.     SIGNATURE: Marilyn Ponce PTA, License #: [id number]  Electronically Signed on 1/12/2022        [address], [place] " [id number]  [phone]

## 2022-01-14 ENCOUNTER — TREATMENT (OUTPATIENT)
Dept: PHYSICAL THERAPY | Facility: CLINIC | Age: 42
End: 2022-01-14

## 2022-01-14 DIAGNOSIS — M54.50 CHRONIC MIDLINE LOW BACK PAIN WITHOUT SCIATICA: Primary | ICD-10-CM

## 2022-01-14 DIAGNOSIS — Z74.09 IMPAIRED MOBILITY: ICD-10-CM

## 2022-01-14 DIAGNOSIS — G89.29 CHRONIC MIDLINE LOW BACK PAIN WITHOUT SCIATICA: Primary | ICD-10-CM

## 2022-01-14 PROCEDURE — 97140 MANUAL THERAPY 1/> REGIONS: CPT | Performed by: PHYSICAL THERAPIST

## 2022-01-14 PROCEDURE — 97110 THERAPEUTIC EXERCISES: CPT | Performed by: PHYSICAL THERAPIST

## 2022-01-14 NOTE — PROGRESS NOTES
Physical Therapy Treatment Note    Patient: Danelle Estrada                                                                     Visit Date: 2022  :     1980    Referring practitioner:    OLIVER Kessler MD  Date of Initial Visit:          Type: THERAPY  Noted: 2021    Patient seen for 4 sessions    Visit Diagnoses:    ICD-10-CM ICD-9-CM   1. Chronic midline low back pain without sciatica  M54.50 724.2    G89.29 338.29   2. Impaired mobility  Z74.09 799.89     SUBJECTIVE     Subjective She did fine with the hip exercises last visit. Moving her legs don't bother her but ones with her back does. She didn't take any Tylenol last night but only takes it if she has a head ache. She had some pain when she left here last time but doesn't have any now. The pain was sharp in her lower spine.     PAIN: 0/10 < /10     OBJECTIVE     Objective      Manual Therapy     [id number]  Comments   STM w/ massage cream to B lumbar paraspinals Massage chair            Timed Minutes 15     Therapeutic Exercises    46086 Comments   Bolstered HEP    B step ups onto 4 in step 1x10   B lateral step ups onto 4 in step 1x10   Modified bird dogs on raised table  2x10   Modified praying mantis on SB on raised table  Attempted, unable   Modified UE walkouts into plank 2x10   Modified mountain climbers on elbows on elevated table  2x10   Seated isometric SB presses 2x10   HL deadbugs  2x10   HL PPT was slightly painful     Seated PPT  2x10   Standing PPT  2x10   Timed Minutes 30     Therapy Education/Self Care 23344   Details: Modified HEP today    Given Home Exercise Program  symptom relief   Progress: New   Education provided to:  Patient   Level of understanding Verbalized   Timed Minutes        Access Code: [email or web address]  URL: [email or web address]  Date: 2022  Prepared by: Marilyn Ponce    Exercises  Hook Lying Single Knee to Chest Stretch with Towel - 1 x  daily - 7 x weekly - 1 sets - 3 reps - 30 sec hold  Standing Hip Abduction with Counter Support - 1 x daily - 7 x weekly - 2 sets - 10 reps  Standing Hip Extension with Counter Support - 1 x daily - 7 x weekly - 2 sets - 10 reps  Seated Transversus Abdominis Bracing - 1 x daily - 7 x weekly - 2 sets - 10 reps      Total Timed Treatment:    45   mins  Total Time of Visit:             45   mins         ASSESSMENT/PLAN     GOALS  Goals                                                  Progress Note: 1/15/22                                                               Re-Cert due: 3/15/22     STG by: 3 weeks Comments Status Date   Pt to demonstrate decreased lumbar paraspinal muscle guarding upon palpation.  guarded throughout   ongoing   1/5   LTG by: 6 weeks         Pt to demonstrate independence with comprehensive HEP. Re-printed and reviewed her HEP   1/12    Pt to demonstrate B hip abductor strength of 4+/5.         Pt to demonstrate B hip extensor strength of 4+/5.         Pt demonstrates pain free lumbar ROM in all planes.          Pt to score < 10/50 on the ADELIA.           Assessment/Plan     ASSESSMENT: She responded well last session and had no pain today upon entry, therefore hip and core strengthening was progressed today with emphasis on PPT. By the end of session she was able to perform PPT in a seated and standing position.     PLAN: Consider reviewing lifting mechanics next visit.     SIGNATURE: Marilyn Ponce PTA, License #: [id number]  Electronically Signed on 1/14/2022        [address] [place] [address]. [address]  [phone]

## 2022-01-19 ENCOUNTER — TREATMENT (OUTPATIENT)
Dept: PHYSICAL THERAPY | Facility: CLINIC | Age: 42
End: 2022-01-19

## 2022-01-19 DIAGNOSIS — G89.29 CHRONIC MIDLINE LOW BACK PAIN WITHOUT SCIATICA: Primary | ICD-10-CM

## 2022-01-19 DIAGNOSIS — M54.50 CHRONIC MIDLINE LOW BACK PAIN WITHOUT SCIATICA: Primary | ICD-10-CM

## 2022-01-19 DIAGNOSIS — Z74.09 IMPAIRED MOBILITY: ICD-10-CM

## 2022-01-19 PROCEDURE — 97110 THERAPEUTIC EXERCISES: CPT | Performed by: PHYSICAL THERAPIST

## 2022-01-19 PROCEDURE — 97530 THERAPEUTIC ACTIVITIES: CPT | Performed by: PHYSICAL THERAPIST

## 2022-01-19 PROCEDURE — 97140 MANUAL THERAPY 1/> REGIONS: CPT | Performed by: PHYSICAL THERAPIST

## 2022-01-19 NOTE — PROGRESS NOTES
Physical Therapy Treatment Note and 30 Day Progress Note    Patient: Danelle Estrada                                                                     Visit Date: 2022  :     1980    Referring practitioner:    OLIVER Kessler MD  Date of Initial Visit:          Type: THERAPY  Noted: 2021    Patient seen for 5 sessions    Visit Diagnoses:    ICD-10-CM ICD-9-CM   1. Chronic midline low back pain without sciatica  M54.50 724.2    G89.29 338.29   2. Impaired mobility  Z74.09 799.89     SUBJECTIVE     Subjective Her back is still hurting.  She tries [employer] and it doesn't help but stretching does. She has a back brace that helps her but she hasn't had to wear it in a while.     PAIN: 4/10 mid back< 0/10     OBJECTIVE     Objective      Therapeutic Activities    [id number] Comments   Reviewed lifting mechanics     Squat lift 2.5 lb box                Timed Minutes 10     Manual Therapy     25380  Comments   STM w/ massage cream to B lumbar paraspinals Massage chair    IASTM w/ blue ridged roller to thoracolumbar paraspinals                Timed Minutes 20     Therapeutic Exercises    19150 Comments   Assessed B hip abd MMT    Assessed B hip ext MMT    HL marches 2x10   Modified mini-bridges B LEs on SB  2x10   HL deadbug w/ SB  2x10   B SL SLRs  2x10   B  SL openbooks  2x10   Quadruped hip ext over SB  2x10   Quadruped TA over SB  2x10                   Timed Minutes 25     Therapy Education/Self Care 32337   Details:    Given Home Exercise Program  symptom relief   Progress: New   Education provided to:  Patient   Level of understanding Verbalized   Timed Minutes        Access Code: [email or web address]  URL: [email or web address]  Date: 2022  Prepared by: Marilyn Ponce    Exercises  Hook Lying Single Knee to Chest Stretch with Towel - 1 x daily - 7 x weekly - 1 sets - 3 reps - 30 sec hold  Standing Hip Abduction with Counter Support - 1 x  daily - 7 x weekly - 2 sets - 10 reps  Standing Hip Extension with Counter Support - 1 x daily - 7 x weekly - 2 sets - 10 reps  Seated Transversus Abdominis Bracing - 1 x daily - 7 x weekly - 2 sets - 10 reps      Total Timed Treatment:    55   mins  Total Time of Visit:             60   mins         ASSESSMENT/PLAN     GOALS  Goals                                                  Progress Note: 2/18/22                                                               Re-Cert due: 3/15/22     STG by: 3 weeks Comments Status Date   Pt to demonstrate decreased lumbar paraspinal muscle guarding upon palpation.  guarded throughout   ongoing   1/19/22   LTG by: 6 weeks         Pt to demonstrate independence with comprehensive HEP. She performs her HEP daily.  Progressing   1/19/22   Pt to demonstrate B hip abductor strength of 4+/5.  R 4+/5   L 4/5  Partially Met  1/19/22   Pt to demonstrate B hip extensor strength of 4+/5.  B 4-/5  Ongoing  1/19/22   Pt demonstrates pain free lumbar ROM in all planes.   Pt reports flexion/ext hurt the most and rotation and lateral flexion hurt but not as bad. She reports the pain is about the same with these positions but notes improvement in flexibility.   Ongoing   1/19/22   Pt to score < 10/50 on the ADELIA.  18/50 on 1/19/22     22/50 on 12/16/21 Progressing   1/19/22     Assessment/Plan     ASSESSMENT: Today all goals were assessed for progress note. At this time, Danelle has partially met 1 of her 6 goals and feels 70% better than when she began therapy. Due to weather and transportation, she has only been able to attend 3 visits past her initial evaluation. Pt reports improvement in her flexibility and decrease in her symptoms. Her primary issue appears to be the increased amount of anterior pelvic tilt she demonstrates at all times. She demonstrated last session the ability to perform PPT in all positions, she just lacks the core strength and endurance to maintain that position to  reduce the strain on her lumbar. We reviewed lifting mechanics today and she demonstrated understanding. Lifting with proper form did irritate her knees but did not bother her back.     PLAN: Continued therapy is indicated to work on improving her hip and core strength and endurance to promote good posture and reduce lumbar strain.     SIGNATURE: Marilyn Ponce PTA, License #: [id number]  Electronically Signed on 1/19/2022        [address] [place] [address]. [address]  [phone]

## 2022-01-19 NOTE — PROGRESS NOTES
Progress Note Addendum      Patient: Danelle Estrada           : 1980  Visit Date: 2022  Referring practitioner: OLIVER Kessler MD  Date of Initial Visit: Type: THERAPY  Noted: 2021  Patient seen for 5 sessions  Visit Diagnoses:    ICD-10-CM ICD-9-CM   1. Chronic midline low back pain without sciatica  M54.50 724.2    G89.29 338.29   2. Impaired mobility  Z74.09 799.89          Clinical Progress: improved  Home Program Compliance: Yes  Progress toward previous goals: Partially Met  Prognosis to achieve goals: good    Objective     Assessment & Plan     Assessment  Impairments: abnormal or restricted ROM, activity intolerance, impaired physical strength, lacks appropriate home exercise program and pain with function  Functional Limitations: lifting, walking, pulling, pushing, uncomfortable because of pain, standing, stooping and unable to perform repetitive tasksPrognosis: good    Plan  Therapy options: will be seen for skilled therapy services  Planned modality interventions: dry needling, TENS and low level laser therapy  Planned therapy interventions: manual therapy, motor coordination training, neuromuscular re-education, balance/weight-bearing training, body mechanics training, postural training, soft tissue mobilization, spinal/joint mobilization, flexibility, functional ROM exercises, strengthening, stretching, gait training, home exercise program, therapeutic activities and joint mobilization  Duration in weeks: 12  Treatment plan discussed with: patient  Plan details:            SIGNATURE: Cameron Schneider PT DPT, License #: [id number]  Electronically Signed on 2022

## 2022-01-20 ENCOUNTER — OFFICE VISIT (OUTPATIENT)
Dept: OBSTETRICS AND GYNECOLOGY | Facility: CLINIC | Age: 42
End: 2022-01-20

## 2022-01-20 VITALS
SYSTOLIC BLOOD PRESSURE: 138 MMHG | HEIGHT: 62 IN | BODY MASS INDEX: 29.81 KG/M2 | DIASTOLIC BLOOD PRESSURE: 80 MMHG | WEIGHT: 162 LBS

## 2022-01-20 DIAGNOSIS — Z80.3 FAMILY HISTORY OF BREAST CANCER: ICD-10-CM

## 2022-01-20 DIAGNOSIS — Z80.41 FAMILY HISTORY OF OVARIAN CANCER: ICD-10-CM

## 2022-01-20 DIAGNOSIS — E66.3 OVERWEIGHT WITH BODY MASS INDEX (BMI) 25.0-29.9: ICD-10-CM

## 2022-01-20 DIAGNOSIS — Z01.419 WELL WOMAN EXAM WITH ROUTINE GYNECOLOGICAL EXAM: Primary | ICD-10-CM

## 2022-01-20 DIAGNOSIS — D21.9 FIBROIDS: ICD-10-CM

## 2022-01-20 PROCEDURE — G0123 SCREEN CERV/VAG THIN LAYER: HCPCS | Performed by: NURSE PRACTITIONER

## 2022-01-20 PROCEDURE — 99396 PREV VISIT EST AGE 40-64: CPT | Performed by: NURSE PRACTITIONER

## 2022-01-20 PROCEDURE — 87624 HPV HI-RISK TYP POOLED RSLT: CPT | Performed by: NURSE PRACTITIONER

## 2022-01-20 RX ORDER — NORGESTIMATE AND ETHINYL ESTRADIOL 0.25-0.035
KIT ORAL
Qty: 28 TABLET | Refills: 12 | Status: SHIPPED | OUTPATIENT
Start: 2022-01-20 | End: 2022-07-19

## 2022-01-20 NOTE — PROGRESS NOTES
Danelle Estrada is a 41 y.o.      Chief Complaint   Patient presents with   • Gynecologic Exam     Patient is here for annual exam her last pap was preformed on 20 and was normal. Patient last pap was 21 and was normal patient has no questions or concerns at this time.            CONSTANTINE Mcclendon is in for gyn exam , followup uterine fibroids.  She is having cyclic       menses and takes BCP.  She has a history of uterine fibroids that have been stable this past year.     The following portions of the patient's history were reviewed and updated as appropriate:vital signs, allergies, current medications, past family history, past medical history, past social history, past surgical history and problem list.      Current Outpatient Medications:   •  acetaminophen (TYLENOL) 500 MG tablet, Take 500 mg by mouth Every 6 (Six) Hours As Needed for Mild Pain ., Disp: , Rfl:   •  acyclovir (ZOVIRAX) 400 MG tablet, Take 2 tablets by mouth 3 (Three) Times a Day. Take no more than 5 doses a day., Disp: 30 tablet, Rfl: 2  •  diphenhydrAMINE (BENADRYL) 25 mg capsule, Take 25 mg by mouth Every 6 (Six) Hours As Needed for Itching., Disp: , Rfl:   •  EPINEPHrine (EPIPEN) 0.3 MG/0.3ML solution auto-injector injection, , Disp: , Rfl:   •  lisinopril (PRINIVIL,ZESTRIL) 5 MG tablet, Take 1 tablet by mouth Daily., Disp: 90 tablet, Rfl: 1  •  Multiple Vitamins-Minerals (MULTIVITAMIN ADULTS PO), Take  by mouth., Disp: , Rfl:   •  norgestimate-ethinyl estradiol (Sprintec 28) 0.25-35 MG-MCG per tablet, 1 po daily, Disp: 28 tablet, Rfl: 12  •  phenol (CHLORASEPTIC) 1.4 % liquid liquid, Apply 5 sprays to the mouth or throat Every 2 (Two) Hours As Needed (sore throat)., Disp: 20 mL, Rfl: 0  •  QUEtiapine (SEROquel) 200 MG tablet, Take 200 mg by mouth Every Night., Disp: , Rfl:   •  verapamil SR (CALAN-SR) 180 MG CR tablet, Take 180 mg by mouth Daily., Disp: , Rfl:     Review of Systems   Constitutional: Positive for  "unexpected weight gain (due to COVID). Negative for activity change and unexpected weight loss.   HENT: Negative for congestion.    Cardiovascular: Negative for chest pain.        Hypertension well controlled   Gastrointestinal: Negative for blood in stool, constipation and diarrhea.   Endocrine: Negative for cold intolerance and heat intolerance.   Genitourinary: Negative for breast lump, dysuria, pelvic pain, urinary incontinence and vaginal discharge.        OCP  Menses light and momthly   Musculoskeletal: Positive for back pain. Negative for arthralgias, neck pain and neck stiffness.   Skin: Negative for rash.   Neurological: Positive for headache (allergies). Negative for dizziness.   Psychiatric/Behavioral: Negative for agitation, sleep disturbance and depressed mood. The patient is not nervous/anxious.          Objective     /80   Ht 157.5 cm (62\")   Wt 73.5 kg (162 lb)   LMP 01/14/2022 (Approximate)   Breastfeeding No   BMI 29.63 kg/m²       Physical Exam  Vitals and nursing note reviewed. Exam conducted with a chaperone present.   Constitutional:       General: She is not in acute distress.     Appearance: She is well-developed. She is not diaphoretic.   HENT:      Head: Normocephalic.      Right Ear: External ear normal.      Left Ear: External ear normal.      Nose: Nose normal.   Eyes:      General: No scleral icterus.        Right eye: No discharge.         Left eye: No discharge.      Conjunctiva/sclera: Conjunctivae normal.      Pupils: Pupils are equal, round, and reactive to light.   Neck:      Thyroid: No thyromegaly.      Vascular: No carotid bruit.      Trachea: No tracheal deviation.   Cardiovascular:      Rate and Rhythm: Normal rate and regular rhythm.      Heart sounds: Normal heart sounds. No murmur heard.      Pulmonary:      Effort: Pulmonary effort is normal. No respiratory distress.      Breath sounds: Normal breath sounds. No wheezing.   Chest:   Breasts: Breasts are " symmetrical.      Right: Normal. No swelling, bleeding, inverted nipple, mass, nipple discharge, skin change, tenderness, axillary adenopathy or supraclavicular adenopathy.      Left: Normal. No swelling, bleeding, inverted nipple, mass, nipple discharge, skin change, tenderness, axillary adenopathy or supraclavicular adenopathy.       Abdominal:      General: There is no distension.      Palpations: Abdomen is soft. There is no mass.      Tenderness: There is no abdominal tenderness. There is no right CVA tenderness, left CVA tenderness or guarding.      Hernia: No hernia is present. There is no hernia in the left inguinal area or right inguinal area.   Genitourinary:     General: Normal vulva.      Exam position: Lithotomy position.      Labia:         Right: No rash, tenderness, lesion or injury.         Left: No rash, tenderness, lesion or injury.       Vagina: Normal. No signs of injury and foreign body. No vaginal discharge, erythema, tenderness or bleeding.      Cervix: Normal.      Uterus: Normal. Not enlarged, not fixed and not tender.       Adnexa: Right adnexa normal and left adnexa normal.        Right: No mass, tenderness or fullness.          Left: No mass, tenderness or fullness.        Rectum: Normal. No mass.      Comments:   BSU normal  Urethral meatus  Normal  Perineum  Normal  Musculoskeletal:         General: No tenderness. Normal range of motion.      Cervical back: Normal range of motion and neck supple.   Lymphadenopathy:      Head:      Right side of head: No submental, submandibular, tonsillar, preauricular, posterior auricular or occipital adenopathy.      Left side of head: No submental, submandibular, tonsillar, preauricular, posterior auricular or occipital adenopathy.      Cervical: No cervical adenopathy.      Right cervical: No superficial, deep or posterior cervical adenopathy.     Left cervical: No superficial, deep or posterior cervical adenopathy.      Upper Body:      Right  upper body: No supraclavicular, axillary or pectoral adenopathy.      Left upper body: No supraclavicular, axillary or pectoral adenopathy.      Lower Body: No right inguinal adenopathy. No left inguinal adenopathy.   Skin:     General: Skin is warm and dry.      Findings: No bruising, erythema or rash.   Neurological:      Mental Status: She is alert and oriented to person, place, and time.      Coordination: Coordination normal.   Psychiatric:         Mood and Affect: Mood normal.         Behavior: Behavior normal.         Thought Content: Thought content normal.         Judgment: Judgment normal.            Assessment/Plan       Diagnoses and all orders for this visit:    1. Well woman exam with routine gynecological exam (Primary)  Comments:  Doing well with Sprintec, denies needing STD screen  Orders:  -     Liquid-based Pap Smear, Screening    2. Fibroids  Comments:  Stable since 3/2021   patient has no pain   several fundal fibroidsl and some pedunculated  usterus measures about 7 cm  Orders:  -     norgestimate-ethinyl estradiol (Sprintec 28) 0.25-35 MG-MCG per tablet; 1 po daily  Dispense: 28 tablet; Refill: 12    3. Family history of ovarian cancer  Comments:  mgm  had ovarian cancer                                          patient reports she has had a negative enetic screening    4. Family history of breast cancer  Comments:  pat. aunt    5. Overweight with body mass index (BMI) 25.0-29.9  Comments:  Patient is counseled that her BMI is outside the desired parameters and she should decrease calories and exercise.    Patient is counseled re: BSE, diet, exercise, mammogram, calcium and Vit. D3    Patient is counseled re: BCP use, risks/benifits, and side effects.    RTO 6 months for US to monitor fibroids.      Patient is counseled re: BSE, diet, exercise, mammogram, calcium and Vit. D3      Alyssia Blankenship, APRN  1/20/2022

## 2022-01-26 ENCOUNTER — TREATMENT (OUTPATIENT)
Dept: PHYSICAL THERAPY | Facility: CLINIC | Age: 42
End: 2022-01-26

## 2022-01-26 DIAGNOSIS — M54.50 CHRONIC MIDLINE LOW BACK PAIN WITHOUT SCIATICA: Primary | ICD-10-CM

## 2022-01-26 DIAGNOSIS — Z74.09 IMPAIRED MOBILITY: ICD-10-CM

## 2022-01-26 DIAGNOSIS — G89.29 CHRONIC MIDLINE LOW BACK PAIN WITHOUT SCIATICA: Primary | ICD-10-CM

## 2022-01-26 PROCEDURE — 97140 MANUAL THERAPY 1/> REGIONS: CPT | Performed by: PHYSICAL THERAPIST

## 2022-01-26 PROCEDURE — 97110 THERAPEUTIC EXERCISES: CPT | Performed by: PHYSICAL THERAPIST

## 2022-01-26 NOTE — PROGRESS NOTES
Physical Therapy Treatment Note    Patient: Danelle Estrada                                                                     Visit Date: 2022  :     1980    Referring practitioner:    OLIVER Kessler MD  Date of Initial Visit:          Type: THERAPY  Noted: 2021    Patient seen for 6 sessions    Visit Diagnoses:    ICD-10-CM ICD-9-CM   1. Chronic midline low back pain without sciatica  M54.50 724.2    G89.29 338.29   2. Impaired mobility  Z74.09 799.89     SUBJECTIVE     Subjective She feels fine today and after last session. She had some pain either  or Monday carrying groceries up her steps. She stretched out afterwards and it helped. It was primarily in her lower back and it didn't last long. She would rate the pain 6/10. She takes tylenol but primarily for her migraines.     PAIN: 0/10 < 0/10     OBJECTIVE     Objective     Manual Therapy     [id number]  Comments   STM w/ massage cream to B lumbar paraspinals Massage chair    IASTM w/ double lacrosse ball to thoracic paraspinals     Upper thoracic extension mobilizations     Sacral PA mobs        Timed Minutes 24     Therapeutic Exercises    80567 Comments   HL isometric ball presses 2x10   HL lateral heel touches 2x10   HL marches over half bolster 2x10   Quadruped 5 lb lateral passes 2x10   Quadruped hip ext against red TB  2x10   HL bridges w/ ER at peak against red TB  2x10   Seated marches on raised table w/ 2 lb wand held in B UE at 90 degrees  2x10   Seated russian twists w/ 4 lb medball 2x10   Standing payloff press at cybex 2x10           Timed Minutes 30     Therapy Education/Self Care 09807   Details:    Given Home Exercise Program  symptom relief   Progress: New   Education provided to:  Patient   Level of understanding Verbalized   Timed Minutes        Access Code: [email or web address]  URL: [email or web address]  Date: 2022  Prepared by: Mrailyn  Kris    Exercises  Hook Lying Single Knee to Chest Stretch with Towel - 1 x daily - 7 x weekly - 1 sets - 3 reps - 30 sec hold  Standing Hip Abduction with Counter Support - 1 x daily - 7 x weekly - 2 sets - 10 reps  Standing Hip Extension with Counter Support - 1 x daily - 7 x weekly - 2 sets - 10 reps  Seated Transversus Abdominis Bracing - 1 x daily - 7 x weekly - 2 sets - 10 reps      Total Timed Treatment:    54   mins  Total Time of Visit:             60   mins         ASSESSMENT/PLAN     GOALS  Goals                                                  Progress Note: 2/18/22                                                               Re-Cert due: 3/15/22     STG by: 3 weeks Comments Status Date   Pt to demonstrate decreased lumbar paraspinal muscle guarding upon palpation.  guarded throughout   ongoing   1/19/22   LTG by: 6 weeks         Pt to demonstrate independence with comprehensive HEP. She performs her HEP daily.  Progressing   1/19/22   Pt to demonstrate B hip abductor strength of 4+/5.  R 4+/5   L 4/5  Partially Met  1/19/22   Pt to demonstrate B hip extensor strength of 4+/5.  B 4-/5  Ongoing  1/19/22   Pt demonstrates pain free lumbar ROM in all planes.   Pt reports flexion/ext hurt the most and rotation and lateral flexion hurt but not as bad. She reports the pain is about the same with these positions but notes improvement in flexibility.   Ongoing   1/19/22   Pt to score < 10/50 on the ADELIA.  18/50 on 1/19/22     22/50 on 12/16/21 Progressing   1/19/22     Assessment/Plan     ASSESSMENT: We continue to progress hip and core strengthening and she did very well. She continues to display increased lordosis in her lumbar spine. Her upper thoracic and sacrum were hypomobile.     PLAN: Bolster HEP next visit and consider adding pillow prop to improve her PPT ability.     SIGNATURE: Marilyn Ponce PTA, License #: [id number]  Electronically Signed on 1/26/2022        [address] [place]  [address]  [phone]

## 2022-01-28 ENCOUNTER — TREATMENT (OUTPATIENT)
Dept: PHYSICAL THERAPY | Facility: CLINIC | Age: 42
End: 2022-01-28

## 2022-01-28 DIAGNOSIS — Z74.09 IMPAIRED MOBILITY: ICD-10-CM

## 2022-01-28 DIAGNOSIS — G89.29 CHRONIC MIDLINE LOW BACK PAIN WITHOUT SCIATICA: Primary | ICD-10-CM

## 2022-01-28 DIAGNOSIS — M54.50 CHRONIC MIDLINE LOW BACK PAIN WITHOUT SCIATICA: Primary | ICD-10-CM

## 2022-01-28 PROCEDURE — 97110 THERAPEUTIC EXERCISES: CPT | Performed by: PHYSICAL THERAPIST

## 2022-01-28 PROCEDURE — 97140 MANUAL THERAPY 1/> REGIONS: CPT | Performed by: PHYSICAL THERAPIST

## 2022-01-28 NOTE — PROGRESS NOTES
Physical Therapy Treatment Note    Patient: Danelle Estrada                                                                     Visit Date: 2022  :     1980    Referring practitioner:    OLIVER Kessler MD  Date of Initial Visit:          Type: THERAPY  Noted: 2021    Patient seen for 7 sessions    Visit Diagnoses:    ICD-10-CM ICD-9-CM   1. Chronic midline low back pain without sciatica  M54.50 724.2    G89.29 338.29   2. Impaired mobility  Z74.09 799.89     SUBJECTIVE     Subjective She denies any soreness after last session. She went home and stretched. She does have an SB. She denies any LBP since last visit.     PAIN: 0/10 < 0/10     OBJECTIVE     Objective     Manual Therapy     [id number]  Comments   STM w/ massage cream to B lumbar paraspinals Massage chair    IASTM w/ pink foam roller to thoracic and gluteals             Timed Minutes 15     Therapeutic Exercises    42475 Comments   Seated march on SB  2x10   PPT on SB 2x10   Reviewed bolstered HEP     HL pillow prop 2 min; HL w/ 2 pillows under pelvis   HL 90/90 toe taps 2x10   Standing PPT against wall Needs work.            Timed Minutes 30     Therapy Education/Self Care 96278   Details:    Given Home Exercise Program  symptom relief   Progress: New   Education provided to:  Patient   Level of understanding Verbalized   Timed Minutes        Access Code: [email or web address]  URL: [email or web address]  Date: 2022  Prepared by: Marilyn Ponce    Exercises  Hook Lying Single Knee to Chest Stretch with Towel - 1 x daily - 7 x weekly - 1 sets - 3 reps - 30 sec hold  Standing Hip Abduction with Counter Support - 1 x daily - 7 x weekly - 2 sets - 10 reps  Standing Hip Extension with Counter Support - 1 x daily - 7 x weekly - 2 sets - 10 reps  Pelvic Tilt on Swiss Ball - 1 x daily - 7 x weekly - 2 sets - 10 reps  Swiss Ball March - 1 x daily - 7 x weekly - 2 sets - 10  reps      Total Timed Treatment:    45   mins  Total Time of Visit:             45   mins         ASSESSMENT/PLAN     GOALS  Goals                                                  Progress Note: 2/18/22                                                               Re-Cert due: 3/15/22     STG by: 3 weeks Comments Status Date   Pt to demonstrate decreased lumbar paraspinal muscle guarding upon palpation.  guarded throughout   ongoing   1/19/22   LTG by: 6 weeks         Pt to demonstrate independence with comprehensive HEP. Bolstered her HEP today Progressing   1/287/22   Pt to demonstrate B hip abductor strength of 4+/5.  R 4+/5   L 4/5  Partially Met  1/19/22   Pt to demonstrate B hip extensor strength of 4+/5.  B 4-/5  Ongoing  1/19/22   Pt demonstrates pain free lumbar ROM in all planes.   Pt reports flexion/ext hurt the most and rotation and lateral flexion hurt but not as bad. She reports the pain is about the same with these positions but notes improvement in flexibility.   Ongoing   1/19/22   Pt to score < 10/50 on the ADELIA.  18/50 on 1/19/22     22/50 on 12/16/21 Progressing   1/19/22     Assessment/Plan     ASSESSMENT: She continues to struggle with PPT, therefore we continued to work on her core strength. Her increased lumbar lordosis is potentially increasing strain in that region. She will not be able to attend therapy next week, therefore her HEP was bolstered today. She demonstrated understanding.     PLAN: Continue to prioritize her PPT ability and core strength to decrease strain in her lumbar.     SIGNATURE: Marilyn Ponce PTA, License #: [id number]  Electronically Signed on 1/28/2022        [address] [place] [address] [place] [address]  [phone]

## 2022-02-16 ENCOUNTER — TREATMENT (OUTPATIENT)
Dept: PHYSICAL THERAPY | Facility: CLINIC | Age: 42
End: 2022-02-16

## 2022-02-16 DIAGNOSIS — Z74.09 IMPAIRED MOBILITY: ICD-10-CM

## 2022-02-16 DIAGNOSIS — G89.29 CHRONIC MIDLINE LOW BACK PAIN WITHOUT SCIATICA: Primary | ICD-10-CM

## 2022-02-16 DIAGNOSIS — M54.50 CHRONIC MIDLINE LOW BACK PAIN WITHOUT SCIATICA: Primary | ICD-10-CM

## 2022-02-16 PROCEDURE — 97110 THERAPEUTIC EXERCISES: CPT

## 2022-02-16 PROCEDURE — 97140 MANUAL THERAPY 1/> REGIONS: CPT

## 2022-02-16 NOTE — PROGRESS NOTES
Physical Therapy Treatment Note and 30 Day Progress Note    Patient: Danelle Estrada                                                                     Visit Date: 2022  :     1980    Referring practitioner:    OLIVER Kessler MD  Date of Initial Visit:          Type: THERAPY  Noted: 2021    Patient seen for 8 sessions    Visit Diagnoses:    ICD-10-CM ICD-9-CM   1. Chronic midline low back pain without sciatica  M54.50 724.2    G89.29 338.29   2. Impaired mobility  Z74.09 799.89     SUBJECTIVE     Subjective She reports that her low back has been doing a lot better, no complaints of pain currently.     PAIN: 0/10      OBJECTIVE     Objective     Manual Therapy     04445  Comments   STM  to B lumbar paraspinals and gluteals  SL   IASTM w/ blue ridged roller to thoracolumbar and gluteals  SL   L lumbar distraction  R SL        Timed Minutes 15     Therapeutic Exercises    48884 Comments   PPT 2x10 with 5 sec hold    Bridging  2 x 10 with 5 second hold and cue fo rPPT    HL 90/90 toe taps 2x10   Standing lumbar AROM    B hip MMT    Seated rollout with 55 cm ball for thoracolumbar stretching  10 x 10 sec hold    Timed Minutes 25     Therapy Education/Self Care 46494   Details: Reviewed and did not progress as her POC is going to be placed on hold    Given Home Exercise Program  symptom relief   Progress: New   Education provided to:  Patient   Level of understanding Verbalized   Timed Minutes        Access Code: [email or web address]  URL: [email or web address]  Date: 2022  Prepared by: Marilyn Ponce    Exercises  Hook Lying Single Knee to Chest Stretch with Towel - 1 x daily - 7 x weekly - 1 sets - 3 reps - 30 sec hold  Standing Hip Abduction with Counter Support - 1 x daily - 7 x weekly - 2 sets - 10 reps  Standing Hip Extension with Counter Support - 1 x daily - 7 x weekly - 2 sets - 10 reps  Pelvic Tilt on Swiss Ball - 1 x daily -  7 x weekly - 2 sets - 10 reps  Swiss Ball March - 1 x daily - 7 x weekly - 2 sets - 10 reps      Total Timed Treatment:    45   mins  Total Time of Visit:             45   mins         ASSESSMENT/PLAN     GOALS  Goals                                                  Progress Note: 3/16/22                                                               Re-Cert due: 3/15/22     STG by: 3 weeks Comments Status Date   Pt to demonstrate decreased lumbar paraspinal muscle guarding upon palpation. Mild guarding, however improved since initial eval and no longer symptomatic  MET 2/16/22   LTG by: 6 weeks         Pt to demonstrate independence with comprehensive HEP. Not progressed as today is her last session, she has been compliant  Ongoing   2/16/22   Pt to demonstrate B hip abductor strength of 4+/5.  R 4+/5   L 4+/5 MET 2/16/22   Pt to demonstrate B hip extensor strength of 4+/5. L 4/5  R 4+/5 Partially met 2/16/22   Pt demonstrates pain free lumbar ROM in all planes.  Painful lumbar flexion   Ongoing  2/16/22   Pt to score < 10/50 on the ADELIA. 16/50 today      18/50 on 1/19/22     22/50 on 12/16/21 Ongoing  2/16/22     Assessment & Plan     Assessment  Impairments: abnormal or restricted ROM, activity intolerance, impaired physical strength, lacks appropriate home exercise program and pain with function  Functional Limitations: lifting, walking, pulling, pushing, uncomfortable because of pain, standing, stooping and unable to perform repetitive tasksPrognosis: good    Plan  Therapy options: will be seen for skilled therapy services  Planned modality interventions: dry needling, TENS and low level laser therapy  Planned therapy interventions: manual therapy, motor coordination training, neuromuscular re-education, balance/weight-bearing training, body mechanics training, postural training, soft tissue mobilization, spinal/joint mobilization, flexibility, functional ROM exercises, strengthening, stretching, gait training,  home exercise program, therapeutic activities and joint mobilization  Treatment plan discussed with: patient  Plan details: POC on hold          ASSESSMENT: Ms. Estrada has progressed very well with physical therapy, she has met or partially met 50% of her goals. She continues to demonstrate painful end range lumbar AROM, however the can perform all functional activities with minimal pain. She feels ready to have POC placed on hold considering her pain has been minimal and she can perform exercises appropriately at home. Reinforced proper core activation today and educated on stretching to perform.     PLAN: POC on hold, she will contact office should needs arise.     SIGNATURE: Melinda Francis, PT DPT, License #: [id number]  Electronically Signed on 2/16/2022        [address] [place] [address]. [address]  [phone]

## 2022-02-24 ENCOUNTER — OFFICE VISIT (OUTPATIENT)
Dept: INTERNAL MEDICINE | Facility: CLINIC | Age: 42
End: 2022-02-24

## 2022-02-24 ENCOUNTER — LAB (OUTPATIENT)
Dept: LAB | Facility: HOSPITAL | Age: 42
End: 2022-02-24

## 2022-02-24 VITALS
SYSTOLIC BLOOD PRESSURE: 168 MMHG | BODY MASS INDEX: 31.65 KG/M2 | DIASTOLIC BLOOD PRESSURE: 112 MMHG | WEIGHT: 172 LBS | HEIGHT: 62 IN | HEART RATE: 90 BPM | OXYGEN SATURATION: 100 %

## 2022-02-24 DIAGNOSIS — I10 ESSENTIAL HYPERTENSION: ICD-10-CM

## 2022-02-24 DIAGNOSIS — Z00.00 MEDICARE ANNUAL WELLNESS VISIT, SUBSEQUENT: ICD-10-CM

## 2022-02-24 DIAGNOSIS — Z00.00 MEDICARE ANNUAL WELLNESS VISIT, SUBSEQUENT: Primary | ICD-10-CM

## 2022-02-24 DIAGNOSIS — Z13.31 DEPRESSION SCREEN: ICD-10-CM

## 2022-02-24 DIAGNOSIS — Z11.59 NEED FOR HEPATITIS C SCREENING TEST: ICD-10-CM

## 2022-02-24 LAB
ALBUMIN SERPL-MCNC: 3.9 G/DL (ref 3.5–5.2)
ALBUMIN/GLOB SERPL: 1.3 G/DL
ALP SERPL-CCNC: 76 U/L (ref 39–117)
ALT SERPL W P-5'-P-CCNC: 9 U/L (ref 1–33)
ANION GAP SERPL CALCULATED.3IONS-SCNC: 10.9 MMOL/L (ref 5–15)
AST SERPL-CCNC: 15 U/L (ref 1–32)
BILIRUB SERPL-MCNC: 0.2 MG/DL (ref 0–1.2)
BUN SERPL-MCNC: 10 MG/DL (ref 6–20)
BUN/CREAT SERPL: 12.2 (ref 7–25)
CALCIUM SPEC-SCNC: 9.2 MG/DL (ref 8.6–10.5)
CHLORIDE SERPL-SCNC: 104 MMOL/L (ref 98–107)
CHOLEST SERPL-MCNC: 196 MG/DL (ref 0–200)
CO2 SERPL-SCNC: 23.1 MMOL/L (ref 22–29)
CREAT SERPL-MCNC: 0.82 MG/DL (ref 0.57–1)
GFR SERPL CREATININE-BSD FRML MDRD: 92 ML/MIN/1.73
GLOBULIN UR ELPH-MCNC: 2.9 GM/DL
GLUCOSE SERPL-MCNC: 91 MG/DL (ref 65–99)
HBA1C MFR BLD: 5.2 % (ref 4.8–5.6)
HCV AB SER DONR QL: NORMAL
HDLC SERPL-MCNC: 73 MG/DL (ref 40–60)
LDLC SERPL CALC-MCNC: 105 MG/DL (ref 0–100)
LDLC/HDLC SERPL: 1.4 {RATIO}
POTASSIUM SERPL-SCNC: 3.9 MMOL/L (ref 3.5–5.2)
PROT SERPL-MCNC: 6.8 G/DL (ref 6–8.5)
SODIUM SERPL-SCNC: 138 MMOL/L (ref 136–145)
TRIGL SERPL-MCNC: 103 MG/DL (ref 0–150)
VLDLC SERPL-MCNC: 18 MG/DL (ref 5–40)

## 2022-02-24 PROCEDURE — 1170F FXNL STATUS ASSESSED: CPT | Performed by: INTERNAL MEDICINE

## 2022-02-24 PROCEDURE — 1159F MED LIST DOCD IN RCRD: CPT | Performed by: INTERNAL MEDICINE

## 2022-02-24 PROCEDURE — 86803 HEPATITIS C AB TEST: CPT

## 2022-02-24 PROCEDURE — 80061 LIPID PANEL: CPT

## 2022-02-24 PROCEDURE — G0439 PPPS, SUBSEQ VISIT: HCPCS | Performed by: INTERNAL MEDICINE

## 2022-02-24 PROCEDURE — 80053 COMPREHEN METABOLIC PANEL: CPT

## 2022-02-24 PROCEDURE — 1126F AMNT PAIN NOTED NONE PRSNT: CPT | Performed by: INTERNAL MEDICINE

## 2022-02-24 PROCEDURE — 83036 HEMOGLOBIN GLYCOSYLATED A1C: CPT

## 2022-02-24 PROCEDURE — 36415 COLL VENOUS BLD VENIPUNCTURE: CPT

## 2022-02-24 RX ORDER — ACYCLOVIR 400 MG/1
800 TABLET ORAL 3 TIMES DAILY
Qty: 30 TABLET | Refills: 2 | Status: CANCELLED | OUTPATIENT
Start: 2022-02-24

## 2022-02-24 RX ORDER — LISINOPRIL 5 MG/1
5 TABLET ORAL DAILY
Qty: 90 TABLET | Refills: 1 | Status: CANCELLED | OUTPATIENT
Start: 2022-02-24

## 2022-02-24 RX ORDER — EPINEPHRINE 0.3 MG/.3ML
0.3 INJECTION SUBCUTANEOUS ONCE
Qty: 2 EACH | Refills: 0 | Status: SHIPPED | OUTPATIENT
Start: 2022-02-24 | End: 2022-02-24

## 2022-02-24 RX ORDER — ACYCLOVIR 400 MG/1
800 TABLET ORAL 3 TIMES DAILY
Qty: 30 TABLET | Refills: 2 | Status: SHIPPED | OUTPATIENT
Start: 2022-02-24 | End: 2023-03-29 | Stop reason: SDUPTHER

## 2022-02-24 RX ORDER — LISINOPRIL 5 MG/1
5 TABLET ORAL DAILY
Qty: 90 TABLET | Refills: 1 | Status: SHIPPED | OUTPATIENT
Start: 2022-02-24 | End: 2022-03-24 | Stop reason: SDUPTHER

## 2022-02-24 RX ORDER — EPINEPHRINE 0.3 MG/.3ML
INJECTION SUBCUTANEOUS
Qty: 1 EACH | Status: CANCELLED | OUTPATIENT
Start: 2022-02-24

## 2022-02-24 NOTE — PROGRESS NOTES
The ABCs of the Annual Wellness Visit  Subsequent Medicare Wellness Visit    Chief Complaint   Patient presents with   • Medicare Wellness-subsequent      Subjective    History of Present Illness:  Danelle Estrada is a 41 y.o. female who presents for a Subsequent Medicare Wellness Visit.    She has completed the physical therapy for her back and is doing much better.    Blood pressure very high today 168/112.  She reports being out of her blood pressure medications for the last 1 week.  Denies any chest pain, shortness of breath, headaches, dizziness or change in vision.      The following portions of the patient's history were reviewed and   updated as appropriate: allergies, current medications, past family history, past medical history, past social history, past surgical history and problem list.    Compared to one year ago, the patient feels her physical   health is better.    Compared to one year ago, the patient feels her mental   health is the same.    Recent Hospitalizations:  She was not admitted to the hospital during the last year.       Current Medical Providers:  Patient Care Team:  OLIVER Kessler MD as PCP - General (Internal Medicine)    Outpatient Medications Prior to Visit   Medication Sig Dispense Refill   • acetaminophen (TYLENOL) 500 MG tablet Take 500 mg by mouth Every 6 (Six) Hours As Needed for Mild Pain .     • diphenhydrAMINE (BENADRYL) 25 mg capsule Take 25 mg by mouth Every 6 (Six) Hours As Needed for Itching.     • Multiple Vitamins-Minerals (MULTIVITAMIN ADULTS PO) Take  by mouth.     • norgestimate-ethinyl estradiol (Sprintec 28) 0.25-35 MG-MCG per tablet 1 po daily 28 tablet 12   • QUEtiapine (SEROquel) 200 MG tablet Take 200 mg by mouth Every Night.     • acyclovir (ZOVIRAX) 400 MG tablet Take 2 tablets by mouth 3 (Three) Times a Day. Take no more than 5 doses a day. 30 tablet 2   • EPINEPHrine (EPIPEN) 0.3 MG/0.3ML solution auto-injector injection      • lisinopril  "(PRINIVIL,ZESTRIL) 5 MG tablet Take 1 tablet by mouth Daily. 90 tablet 1   • verapamil SR (CALAN-SR) 180 MG CR tablet Take 180 mg by mouth Daily.     • phenol (CHLORASEPTIC) 1.4 % liquid liquid Apply 5 sprays to the mouth or throat Every 2 (Two) Hours As Needed (sore throat). 20 mL 0     No facility-administered medications prior to visit.       No opioid medication identified on active medication list. I have reviewed chart for other potential  high risk medication/s and harmful drug interactions in the elderly.          Aspirin is not on active medication list.  Aspirin use is not indicated based on review of current medical condition/s. Risk of harm outweighs potential benefits.  .    Patient Active Problem List   Diagnosis   • Post-operative state   • Essential hypertension   • Chronic midline low back pain without sciatica   • Schizophrenia (HCC)   • DDD (degenerative disc disease), lumbar     Advance Care Planning  Advance Directive is not on file.  ACP discussion was held with the patient during this visit. Patient does not have an advance directive, declines further assistance.    Review of Systems   Constitutional: Negative for activity change, appetite change, diaphoresis, fatigue and fever.   Eyes: Negative for visual disturbance.   Respiratory: Negative for cough and shortness of breath.    Cardiovascular: Negative for chest pain, palpitations and leg swelling.   Gastrointestinal: Negative.    Musculoskeletal: Negative.    Skin: Negative.    Neurological: Negative.    Psychiatric/Behavioral: Negative.         Objective    Vitals:    02/24/22 1026   BP: (!) 168/112   BP Location: Left arm   Patient Position: Sitting   Cuff Size: Adult   Pulse: 90   SpO2: 100%   Weight: 78 kg (172 lb)   Height: 157.5 cm (62\")     BMI Readings from Last 1 Encounters:   02/24/22 31.46 kg/m²   BMI is above normal parameters. Recommendations include: exercise counseling    Does the patient have evidence of cognitive " impairment? No    Physical Exam  Vitals reviewed.   Constitutional:       General: She is not in acute distress.     Appearance: Normal appearance. She is well-developed.   HENT:      Head: Normocephalic and atraumatic.      Right Ear: Tympanic membrane, ear canal and external ear normal. There is no impacted cerumen.      Left Ear: Tympanic membrane, ear canal and external ear normal. There is no impacted cerumen.      Mouth/Throat:      Pharynx: No oropharyngeal exudate.   Eyes:      General: No scleral icterus.     Conjunctiva/sclera: Conjunctivae normal.      Pupils: Pupils are equal, round, and reactive to light.   Neck:      Thyroid: No thyromegaly.      Vascular: No carotid bruit or JVD.   Cardiovascular:      Rate and Rhythm: Normal rate and regular rhythm.      Heart sounds: Normal heart sounds. No murmur heard.  No friction rub. No gallop.    Pulmonary:      Effort: Pulmonary effort is normal. No respiratory distress.      Breath sounds: Normal breath sounds. No stridor. No wheezing, rhonchi or rales.   Abdominal:      General: Bowel sounds are normal. There is no distension.      Palpations: Abdomen is soft.      Tenderness: There is no abdominal tenderness.   Skin:     General: Skin is warm and dry.      Coloration: Skin is not jaundiced.   Neurological:      General: No focal deficit present.      Mental Status: She is alert.      GCS: GCS eye subscore is 4. GCS verbal subscore is 5. GCS motor subscore is 6.      Cranial Nerves: No cranial nerve deficit.      Deep Tendon Reflexes:      Reflex Scores:       Patellar reflexes are 1+ on the right side and 1+ on the left side.  Psychiatric:         Mood and Affect: Mood normal.         Behavior: Behavior normal.         Thought Content: Thought content normal.         Judgment: Judgment normal.                 HEALTH RISK ASSESSMENT    Smoking Status:  Social History     Tobacco Use   Smoking Status Never Smoker   Smokeless Tobacco Never Used     Alcohol  Consumption:  Social History     Substance and Sexual Activity   Alcohol Use No     Fall Risk Screen:    SKYEADI Fall Risk Assessment was completed, and patient is at LOW risk for falls.Assessment completed on:2/24/2022    Depression Screening:  PHQ-2/PHQ-9 Depression Screening 2/24/2022   Little interest or pleasure in doing things 0   Feeling down, depressed, or hopeless 0   Total Score 0       Health Habits and Functional and Cognitive Screening:  Functional & Cognitive Status 2/24/2022   Do you have difficulty preparing food and eating? No   Do you have difficulty bathing yourself, getting dressed or grooming yourself? No   Do you have difficulty using the toilet? No   Do you have difficulty moving around from place to place? No   Do you have trouble with steps or getting out of a bed or a chair? No   Current Diet Well Balanced Diet   Dental Exam Up to date   Eye Exam Up to date   Exercise (times per week) 2 times per week   Current Exercises Include House Cleaning   Do you need help using the phone?  No   Are you deaf or do you have serious difficulty hearing?  No   Do you need help with transportation? No   Do you need help shopping? No   Do you need help preparing meals?  No   Do you need help with housework?  No   Do you need help with laundry? No   Do you need help taking your medications? No   Do you need help managing money? No   Do you ever drive or ride in a car without wearing a seat belt? No   Have you felt unusual stress, anger or loneliness in the last month? Yes   Who do you live with? Alone   If you need help, do you have trouble finding someone available to you? No   Do you have difficulty concentrating, remembering or making decisions? No       Age-appropriate Screening Schedule:  Refer to the list below for future screening recommendations based on patient's age, sex and/or medical conditions. Orders for these recommended tests are listed in the plan section. The patient has been provided with a  written plan.    Health Maintenance   Topic Date Due   • TDAP/TD VACCINES (2 - Tdap) 08/10/2014   • INFLUENZA VACCINE  08/01/2021   • PAP SMEAR  01/20/2025              Assessment/Plan   CMS Preventative Services Quick Reference  Risk Factors Identified During Encounter  Obesity/Overweight   The above risks/problems have been discussed with the patient.  Follow up actions/plans if indicated are seen below in the Assessment/Plan Section.  Pertinent information has been shared with the patient in the After Visit Summary.    Diagnoses and all orders for this visit:    1. Medicare annual wellness visit, subsequent (Primary)  -     Comprehensive Metabolic Panel; Future  -     Lipid Panel; Future  -     Hemoglobin A1c; Future  -     Hepatitis C Antibody; Future    2. Depression screen    3. Essential hypertension  -     lisinopril (PRINIVIL,ZESTRIL) 5 MG tablet; Take 1 tablet by mouth Daily.  Dispense: 90 tablet; Refill: 1    4. Need for hepatitis C screening test  -     Hepatitis C Antibody; Future    Other orders  -     EPINEPHrine (EPIPEN) 0.3 MG/0.3ML solution auto-injector injection; Inject 0.3 mL into the appropriate muscle as directed by prescriber 1 (One) Time for 1 dose.  Dispense: 2 each; Refill: 0  -     verapamil SR (CALAN-SR) 180 MG CR tablet; Take 1 tablet by mouth Daily.  Dispense: 90 tablet; Refill: 1  -     acyclovir (Zovirax) 400 MG tablet; Take 2 tablets by mouth 3 (Three) Times a Day. Take no more than 5 doses a day.  Dispense: 30 tablet; Refill: 2      Restart her antihypertensive medications.  I would expect her blood pressure to return back to normal.  However, would like to see her in the office in 4 weeks to recheck her blood pressure.  Further adjustment of medications as needed.    Depression screen was negative with PHQ 2 of 0.  She is up-to-date on her breast cancer screening and had a BI-RADS Category 1 mammogram at [facility] December 8, 2021.    Refill the above medications    I would like to  check CMP, lipid panel, A1c, and a hepatitis C antibody.  Further work-up or management based on those results.    Follow-up in 4 weeks with a blood pressure check.  Follow-up sooner if needed.      Follow Up:   Return in about 4 weeks (around 3/24/2022) for Recheck BP.     An After Visit Summary and PPPS were made available to the patient.

## 2022-03-24 ENCOUNTER — OFFICE VISIT (OUTPATIENT)
Dept: INTERNAL MEDICINE | Facility: CLINIC | Age: 42
End: 2022-03-24

## 2022-03-24 VITALS
TEMPERATURE: 97.6 F | BODY MASS INDEX: 30.91 KG/M2 | RESPIRATION RATE: 15 BRPM | WEIGHT: 168 LBS | HEART RATE: 98 BPM | DIASTOLIC BLOOD PRESSURE: 98 MMHG | SYSTOLIC BLOOD PRESSURE: 150 MMHG | OXYGEN SATURATION: 98 % | HEIGHT: 62 IN

## 2022-03-24 DIAGNOSIS — I10 ESSENTIAL HYPERTENSION: ICD-10-CM

## 2022-03-24 PROCEDURE — 99213 OFFICE O/P EST LOW 20 MIN: CPT | Performed by: INTERNAL MEDICINE

## 2022-03-24 RX ORDER — EPINEPHRINE 0.3 MG/.3ML
INJECTION SUBCUTANEOUS AS NEEDED
COMMUNITY
Start: 2022-02-24 | End: 2022-08-29 | Stop reason: SDUPTHER

## 2022-03-24 RX ORDER — LISINOPRIL 10 MG/1
10 TABLET ORAL DAILY
Qty: 90 TABLET | Refills: 1 | Status: SHIPPED | OUTPATIENT
Start: 2022-03-24 | End: 2022-08-29 | Stop reason: SDUPTHER

## 2022-03-24 NOTE — PROGRESS NOTES
Chief Complaint   Patient presents with   • Hypertension         History:  Danelle Estrada is a 41 y.o. female who presents today for evaluation of the above problems.      Presents for 4-week follow-up on hypertension.  At the last visit she was out of her antihypertensive medications.  Those were restarted and her blood pressure remains elevated.  She states her blood pressure at home is about the same as it is here.  She does not watch her salt intake but does report eating quite a salt.    Denies any new issues or complaints    HPI      ROS:  Review of Systems     See above      Current Outpatient Medications:   •  acetaminophen (TYLENOL) 500 MG tablet, Take 500 mg by mouth Every 6 (Six) Hours As Needed for Mild Pain ., Disp: , Rfl:   •  acyclovir (Zovirax) 400 MG tablet, Take 2 tablets by mouth 3 (Three) Times a Day. Take no more than 5 doses a day., Disp: 30 tablet, Rfl: 2  •  diphenhydrAMINE (BENADRYL) 25 mg capsule, Take 25 mg by mouth Every 6 (Six) Hours As Needed for Itching., Disp: , Rfl:   •  EPINEPHrine (EPIPEN) 0.3 MG/0.3ML solution auto-injector injection, As Needed., Disp: , Rfl:   •  lisinopril (PRINIVIL,ZESTRIL) 10 MG tablet, Take 1 tablet by mouth Daily., Disp: 90 tablet, Rfl: 1  •  Multiple Vitamins-Minerals (MULTIVITAMIN ADULTS PO), Take  by mouth., Disp: , Rfl:   •  norgestimate-ethinyl estradiol (Sprintec 28) 0.25-35 MG-MCG per tablet, 1 po daily, Disp: 28 tablet, Rfl: 12  •  QUEtiapine (SEROquel) 200 MG tablet, Take 200 mg by mouth Every Night., Disp: , Rfl:   •  verapamil SR (CALAN-SR) 180 MG CR tablet, Take 1 tablet by mouth Daily., Disp: 90 tablet, Rfl: 1    Lab Results   Component Value Date    GLUCOSE 91 02/24/2022    BUN 10 02/24/2022    CREATININE 0.82 02/24/2022    EGFRIFAFRI 92 02/24/2022    BCR 12.2 02/24/2022    K 3.9 02/24/2022    CO2 23.1 02/24/2022    CALCIUM 9.2 02/24/2022    ALBUMIN 3.90 02/24/2022    AST 15 02/24/2022    ALT 9 02/24/2022       WBC   Date Value Ref Range  Status   06/11/2021 5.42 3.40 - 10.80 10*3/mm3 Final     RBC   Date Value Ref Range Status   06/11/2021 4.89 3.77 - 5.28 10*6/mm3 Final     Hemoglobin   Date Value Ref Range Status   06/11/2021 14.6 12.0 - 15.9 g/dL Final     Hematocrit   Date Value Ref Range Status   06/11/2021 43.6 34.0 - 46.6 % Final     MCV   Date Value Ref Range Status   06/11/2021 89.2 79.0 - 97.0 fL Final     MCH   Date Value Ref Range Status   06/11/2021 29.9 26.6 - 33.0 pg Final     MCHC   Date Value Ref Range Status   06/11/2021 33.5 31.5 - 35.7 g/dL Final     RDW   Date Value Ref Range Status   06/11/2021 12.9 12.3 - 15.4 % Final     RDW-SD   Date Value Ref Range Status   06/11/2021 42.5 37.0 - 54.0 fl Final     MPV   Date Value Ref Range Status   06/11/2021 9.4 6.0 - 12.0 fL Final     Platelets   Date Value Ref Range Status   06/11/2021 315 140 - 450 10*3/mm3 Final     Neutrophil %   Date Value Ref Range Status   06/11/2021 51.7 42.7 - 76.0 % Final     Lymphocyte %   Date Value Ref Range Status   06/11/2021 35.2 19.6 - 45.3 % Final     Monocyte %   Date Value Ref Range Status   06/11/2021 10.7 5.0 - 12.0 % Final     Eosinophil %   Date Value Ref Range Status   06/11/2021 1.1 0.3 - 6.2 % Final     Basophil %   Date Value Ref Range Status   06/11/2021 1.1 0.0 - 1.5 % Final     Immature Grans %   Date Value Ref Range Status   06/11/2021 0.2 0.0 - 0.5 % Final     Neutrophils, Absolute   Date Value Ref Range Status   06/11/2021 2.80 1.70 - 7.00 10*3/mm3 Final     Lymphocytes, Absolute   Date Value Ref Range Status   06/11/2021 1.91 0.70 - 3.10 10*3/mm3 Final     Monocytes, Absolute   Date Value Ref Range Status   06/11/2021 0.58 0.10 - 0.90 10*3/mm3 Final     Eosinophils, Absolute   Date Value Ref Range Status   06/11/2021 0.06 0.00 - 0.40 10*3/mm3 Final     Basophils, Absolute   Date Value Ref Range Status   06/11/2021 0.06 0.00 - 0.20 10*3/mm3 Final     Immature Grans, Absolute   Date Value Ref Range Status   06/11/2021 0.01 0.00 - 0.05  "10*3/mm3 Final     [facility]   Date Value Ref Range Status   06/11/2021 0.0 0.0 - 0.2 /100 WBC Final         OBJECTIVE:  Visit Vitals  /98 (BP Location: Left arm, Patient Position: Sitting, Cuff Size: Adult)   Pulse 98   Temp 97.6 °F (36.4 °C) (Oral)   Resp 15   Ht 157.5 cm (62.01\")   Wt 76.2 kg (168 lb)   SpO2 98%   BMI 30.72 kg/m²      Physical Exam  Vitals and nursing note reviewed.   Constitutional:       General: She is not in acute distress.     Appearance: She is well-developed. She is not diaphoretic.      Comments: Pleasant     HENT:      Head: Normocephalic and atraumatic.   Eyes:      Pupils: Pupils are equal, round, and reactive to light.   Neck:      Thyroid: No thyromegaly.      Trachea: Phonation normal.   Pulmonary:      Effort: No respiratory distress.   Skin:     Coloration: Skin is not pale.      Findings: No erythema.   Neurological:      Mental Status: She is alert.   Psychiatric:         Behavior: Behavior normal.         Thought Content: Thought content normal.         Judgment: Judgment normal.         Assessment/Plan    Diagnoses and all orders for this visit:    1. Essential hypertension  -     lisinopril (PRINIVIL,ZESTRIL) 10 MG tablet; Take 1 tablet by mouth Daily.  Dispense: 90 tablet; Refill: 1      The blood pressure is better, but not controlled.  Increase lisinopril to 10 mg daily.  We discussed low-salt diet and given information regarding DASH diet and ways to cope with less salt.  We will follow-up in 4 weeks to recheck her blood pressure, or sooner if needed.    Return in about 4 weeks (around 4/21/2022) for Recheck BP.      YVETTE Kessler MD  08:30 CDT  3/24/2022   "

## 2022-04-27 ENCOUNTER — OFFICE VISIT (OUTPATIENT)
Dept: INTERNAL MEDICINE | Facility: CLINIC | Age: 42
End: 2022-04-27

## 2022-04-27 VITALS
SYSTOLIC BLOOD PRESSURE: 132 MMHG | HEART RATE: 92 BPM | HEIGHT: 62 IN | TEMPERATURE: 97.6 F | WEIGHT: 169 LBS | RESPIRATION RATE: 15 BRPM | OXYGEN SATURATION: 99 % | DIASTOLIC BLOOD PRESSURE: 75 MMHG | BODY MASS INDEX: 31.1 KG/M2

## 2022-04-27 DIAGNOSIS — E87.1 HYPONATREMIA: ICD-10-CM

## 2022-04-27 DIAGNOSIS — I10 ESSENTIAL HYPERTENSION: Primary | ICD-10-CM

## 2022-04-27 PROCEDURE — 99213 OFFICE O/P EST LOW 20 MIN: CPT | Performed by: INTERNAL MEDICINE

## 2022-04-27 NOTE — PROGRESS NOTES
Chief Complaint   Patient presents with   • Follow-up   • Hypertension         History:  Danelle Estrada is a 41 y.o. female who presents today for evaluation of the above problems.      Presents for 4-week follow-up on hypertension.  At the last visit we increased lisinopril to 10mg and recommended low salt diet. She was continued on verapamil 180mg daily    BP is much better today  She is decrease her salt intake.    Denies any new issues or complaints    No refills needed.    Reviewed blood work obtained at [facility].  Her sodium was 134 and her serum osmolality was low at 280.  He reports drinking a full gallon of water per day.  I recommended she increase her water intake.    HPI      ROS:  Review of Systems     See above      Current Outpatient Medications:   •  acetaminophen (TYLENOL) 500 MG tablet, Take 500 mg by mouth Every 6 (Six) Hours As Needed for Mild Pain ., Disp: , Rfl:   •  acyclovir (Zovirax) 400 MG tablet, Take 2 tablets by mouth 3 (Three) Times a Day. Take no more than 5 doses a day., Disp: 30 tablet, Rfl: 2  •  diphenhydrAMINE (BENADRYL) 25 mg capsule, Take 25 mg by mouth Every 6 (Six) Hours As Needed for Itching., Disp: , Rfl:   •  EPINEPHrine (EPIPEN) 0.3 MG/0.3ML solution auto-injector injection, As Needed., Disp: , Rfl:   •  lisinopril (PRINIVIL,ZESTRIL) 10 MG tablet, Take 1 tablet by mouth Daily., Disp: 90 tablet, Rfl: 1  •  Multiple Vitamins-Minerals (MULTIVITAMIN ADULTS PO), Take  by mouth., Disp: , Rfl:   •  norgestimate-ethinyl estradiol (Sprintec 28) 0.25-35 MG-MCG per tablet, 1 po daily, Disp: 28 tablet, Rfl: 12  •  QUEtiapine (SEROquel) 200 MG tablet, Take 200 mg by mouth Every Night., Disp: , Rfl:   •  verapamil SR (CALAN-SR) 180 MG CR tablet, Take 1 tablet by mouth Daily., Disp: 90 tablet, Rfl: 1    Lab Results   Component Value Date    GLUCOSE 91 02/24/2022    BUN 10 02/24/2022    CREATININE 0.82 02/24/2022    EGFRIFAFRI 92 02/24/2022    BCR 12.2 02/24/2022    K  3.9 02/24/2022    CO2 23.1 02/24/2022    CALCIUM 9.2 02/24/2022    ALBUMIN 3.90 02/24/2022    AST 15 02/24/2022    ALT 9 02/24/2022       WBC   Date Value Ref Range Status   06/11/2021 5.42 3.40 - 10.80 10*3/mm3 Final     RBC   Date Value Ref Range Status   06/11/2021 4.89 3.77 - 5.28 10*6/mm3 Final     Hemoglobin   Date Value Ref Range Status   06/11/2021 14.6 12.0 - 15.9 g/dL Final     Hematocrit   Date Value Ref Range Status   06/11/2021 43.6 34.0 - 46.6 % Final     MCV   Date Value Ref Range Status   06/11/2021 89.2 79.0 - 97.0 fL Final     MCH   Date Value Ref Range Status   06/11/2021 29.9 26.6 - 33.0 pg Final     MCHC   Date Value Ref Range Status   06/11/2021 33.5 31.5 - 35.7 g/dL Final     RDW   Date Value Ref Range Status   06/11/2021 12.9 12.3 - 15.4 % Final     RDW-SD   Date Value Ref Range Status   06/11/2021 42.5 37.0 - 54.0 fl Final     MPV   Date Value Ref Range Status   06/11/2021 9.4 6.0 - 12.0 fL Final     Platelets   Date Value Ref Range Status   06/11/2021 315 140 - 450 10*3/mm3 Final     Neutrophil %   Date Value Ref Range Status   06/11/2021 51.7 42.7 - 76.0 % Final     Lymphocyte %   Date Value Ref Range Status   06/11/2021 35.2 19.6 - 45.3 % Final     Monocyte %   Date Value Ref Range Status   06/11/2021 10.7 5.0 - 12.0 % Final     Eosinophil %   Date Value Ref Range Status   06/11/2021 1.1 0.3 - 6.2 % Final     Basophil %   Date Value Ref Range Status   06/11/2021 1.1 0.0 - 1.5 % Final     Immature Grans %   Date Value Ref Range Status   06/11/2021 0.2 0.0 - 0.5 % Final     Neutrophils, Absolute   Date Value Ref Range Status   06/11/2021 2.80 1.70 - 7.00 10*3/mm3 Final     Lymphocytes, Absolute   Date Value Ref Range Status   06/11/2021 1.91 0.70 - 3.10 10*3/mm3 Final     Monocytes, Absolute   Date Value Ref Range Status   06/11/2021 0.58 0.10 - 0.90 10*3/mm3 Final     Eosinophils, Absolute   Date Value Ref Range Status   06/11/2021 0.06 0.00 - 0.40 10*3/mm3 Final     Basophils, Absolute  "  Date Value Ref Range Status   06/11/2021 0.06 0.00 - 0.20 10*3/mm3 Final     Immature Grans, Absolute   Date Value Ref Range Status   06/11/2021 0.01 0.00 - 0.05 10*3/mm3 Final     nRBC   Date Value Ref Range Status   06/11/2021 0.0 0.0 - 0.2 /100 WBC Final         OBJECTIVE:  Visit Vitals  /75 (BP Location: Left arm, Patient Position: Sitting, Cuff Size: Adult)   Pulse 92   Temp 97.6 °F (36.4 °C) (Oral)   Resp 15   Ht 157.5 cm (62.01\")   Wt 76.7 kg (169 lb)   SpO2 99%   BMI 30.90 kg/m²      Physical Exam  Vitals and nursing note reviewed.   Constitutional:       General: She is not in acute distress.     Appearance: She is well-developed. She is not diaphoretic.      Comments: Pleasant     HENT:      Head: Normocephalic and atraumatic.   Eyes:      Pupils: Pupils are equal, round, and reactive to light.   Neck:      Thyroid: No thyromegaly.      Trachea: Phonation normal.   Pulmonary:      Effort: No respiratory distress.   Skin:     Coloration: Skin is not pale.      Findings: No erythema.   Neurological:      Mental Status: She is alert.   Psychiatric:         Behavior: Behavior normal.         Thought Content: Thought content normal.         Judgment: Judgment normal.       Reviewed my last office note from March 24, 2022  Assessment/Plan    Diagnoses and all orders for this visit:    1. Essential hypertension (Primary)    2. Hyponatremia    Her blood pressure is much better and is now at her goal.  We will continue lisinopril 10 mg and verapamil 180 mg daily.  Continue DASH diet.    She had mild hyponatremia with a sodium of 134.  I believe she is drinking too much water and I have asked her to cut back slightly on the amount she drinks per day.  She notes understanding.    Follow-up in 6 months or sooner if needed    Return in about 6 months (around 10/27/2022) for Recheck.      YVETTE Kessler MD  08:30 CDT  4/27/2022   "

## 2022-07-19 ENCOUNTER — OFFICE VISIT (OUTPATIENT)
Dept: OBSTETRICS AND GYNECOLOGY | Facility: CLINIC | Age: 42
End: 2022-07-19

## 2022-07-19 VITALS
DIASTOLIC BLOOD PRESSURE: 90 MMHG | HEIGHT: 62 IN | WEIGHT: 166 LBS | BODY MASS INDEX: 30.55 KG/M2 | SYSTOLIC BLOOD PRESSURE: 130 MMHG

## 2022-07-19 DIAGNOSIS — E66.9 OBESITY (BMI 30-39.9): Primary | ICD-10-CM

## 2022-07-19 DIAGNOSIS — D21.9 FIBROIDS: ICD-10-CM

## 2022-07-19 DIAGNOSIS — Z30.41 ENCOUNTER FOR SURVEILLANCE OF CONTRACEPTIVE PILLS: ICD-10-CM

## 2022-07-19 PROCEDURE — 99213 OFFICE O/P EST LOW 20 MIN: CPT | Performed by: NURSE PRACTITIONER

## 2022-07-19 RX ORDER — NORGESTIMATE AND ETHINYL ESTRADIOL 0.25-0.035
KIT ORAL
Qty: 84 TABLET | Refills: 1 | Status: SHIPPED | OUTPATIENT
Start: 2022-07-19 | End: 2023-01-24 | Stop reason: SDUPTHER

## 2022-07-19 NOTE — PROGRESS NOTES
Danelle Estrada is a 41 y.o.      Chief Complaint   Patient presents with   • Fibroids     Patient is  here to follow up on fibroids after having an ultrasound.            HPI - Patient is here for followup US (6 months) for fibroids.  She has no symptoms and is happy with the BCP with cyclic menses.    The following portions of the patient's history were reviewed and updated as appropriate:vital signs, allergies, current medications, past family history, past medical history, past social history, past surgical history and problem list.      Current Outpatient Medications:   •  acetaminophen (TYLENOL) 500 MG tablet, Take 500 mg by mouth Every 6 (Six) Hours As Needed for Mild Pain ., Disp: , Rfl:   •  acyclovir (Zovirax) 400 MG tablet, Take 2 tablets by mouth 3 (Three) Times a Day. Take no more than 5 doses a day., Disp: 30 tablet, Rfl: 2  •  diphenhydrAMINE (BENADRYL) 25 mg capsule, Take 25 mg by mouth Every 6 (Six) Hours As Needed for Itching., Disp: , Rfl:   •  EPINEPHrine (EPIPEN) 0.3 MG/0.3ML solution auto-injector injection, As Needed., Disp: , Rfl:   •  lisinopril (PRINIVIL,ZESTRIL) 10 MG tablet, Take 1 tablet by mouth Daily., Disp: 90 tablet, Rfl: 1  •  Multiple Vitamins-Minerals (MULTIVITAMIN ADULTS PO), Take  by mouth., Disp: , Rfl:   •  norgestimate-ethinyl estradiol (Sprintec 28) 0.25-35 MG-MCG per tablet, 1 po daily, Disp: 28 tablet, Rfl: 12  •  QUEtiapine (SEROquel) 200 MG tablet, Take 200 mg by mouth Every Night., Disp: , Rfl:   •  verapamil SR (CALAN-SR) 180 MG CR tablet, Take 1 tablet by mouth Daily., Disp: 90 tablet, Rfl: 1    Review of Systems   Constitutional: Negative for activity change, unexpected weight gain (due to COVID) and unexpected weight loss.   HENT: Negative for congestion.    Cardiovascular: Negative for chest pain.        Hypertension well controlled   Gastrointestinal: Negative for blood in stool, constipation and diarrhea.   Endocrine: Negative for cold intolerance  "and heat intolerance.   Genitourinary: Negative for breast lump, dysuria, pelvic pain, urinary incontinence and vaginal discharge.        OCP  Menses light and monthly  Patient has uterine fiboids   Musculoskeletal: Positive for back pain. Negative for arthralgias, neck pain and neck stiffness.   Skin: Negative for rash.   Neurological: Positive for headache (allergies). Negative for dizziness.   Psychiatric/Behavioral: Negative for agitation, sleep disturbance and depressed mood. The patient is not nervous/anxious.          Objective     /90   Ht 157.5 cm (62.01\")   Wt 75.3 kg (166 lb)   LMP 07/01/2022   Breastfeeding No   BMI 30.35 kg/m²       Physical Exam  Vitals and nursing note reviewed. Exam conducted with a chaperone present.   HENT:      Head: Normocephalic and atraumatic.      Right Ear: External ear normal.      Left Ear: External ear normal.      Nose: No congestion or rhinorrhea.   Eyes:      General:         Right eye: No discharge.         Left eye: No discharge.   Cardiovascular:      Rate and Rhythm: Normal rate.   Pulmonary:      Effort: Pulmonary effort is normal.   Musculoskeletal:         General: No swelling.   Skin:     General: Skin is warm.   Neurological:      General: No focal deficit present.      Mental Status: She is alert and oriented to person, place, and time.   Psychiatric:         Mood and Affect: Mood normal.         Behavior: Behavior normal.         Thought Content: Thought content normal.            Assessment & Plan       Diagnoses and all orders for this visit:    1. Fibroids            Alyssia Blankenship, APRN  7/19/2022  "

## 2022-08-29 DIAGNOSIS — I10 ESSENTIAL HYPERTENSION: ICD-10-CM

## 2022-08-29 RX ORDER — EPINEPHRINE 0.3 MG/.3ML
0.3 INJECTION SUBCUTANEOUS AS NEEDED
Qty: 1 EACH | Refills: 0 | Status: SHIPPED | OUTPATIENT
Start: 2022-08-29

## 2022-08-29 RX ORDER — LISINOPRIL 10 MG/1
10 TABLET ORAL DAILY
Qty: 90 TABLET | Refills: 1 | Status: SHIPPED | OUTPATIENT
Start: 2022-08-29 | End: 2022-10-21 | Stop reason: SDUPTHER

## 2022-10-20 ENCOUNTER — OFFICE VISIT (OUTPATIENT)
Dept: OBSTETRICS AND GYNECOLOGY | Facility: CLINIC | Age: 42
End: 2022-10-20

## 2022-10-20 VITALS
WEIGHT: 160 LBS | DIASTOLIC BLOOD PRESSURE: 90 MMHG | HEIGHT: 62 IN | SYSTOLIC BLOOD PRESSURE: 136 MMHG | BODY MASS INDEX: 29.44 KG/M2

## 2022-10-20 DIAGNOSIS — Z30.41 ENCOUNTER FOR SURVEILLANCE OF CONTRACEPTIVE PILLS: ICD-10-CM

## 2022-10-20 DIAGNOSIS — D21.9 FIBROIDS: Primary | ICD-10-CM

## 2022-10-20 PROCEDURE — 99213 OFFICE O/P EST LOW 20 MIN: CPT | Performed by: OBSTETRICS & GYNECOLOGY

## 2022-10-20 NOTE — PROGRESS NOTES
"Subjective   Danelle Estrada is a 42 y.o. female.     Chief Complaint   Patient presents with   • Contraception     Patient currently taking OCP but would like to discuss Nexplanon. Patient also has uterine fibroids and states she gets very painful ovarian cysts if she is not taking birth control.        History of Present Illness  42 year old female  presents for follow-up on fibroids.  Patient was previously placed on combined oral contraceptives.  She has been doing well with them.  She is interested in discussing a Nexplanon.  She also reports a history of ovarian cyst.  She denies any smoking at this time.  She is up-to-date on her mammogram.       Review of Systems   Genitourinary: Negative for menstrual problem.       Objective   /90   Ht 157.5 cm (62\")   Wt 72.6 kg (160 lb)   BMI 29.26 kg/m²   No LMP recorded.  Physical Exam  Vitals and nursing note reviewed.   Constitutional:       General: She is not in acute distress.     Appearance: She is well-developed.   HENT:      Head: Normocephalic and atraumatic.   Eyes:      Conjunctiva/sclera: Conjunctivae normal.   Pulmonary:      Effort: Pulmonary effort is normal.   Musculoskeletal:         General: Normal range of motion.      Cervical back: Normal range of motion and neck supple.   Skin:     General: Skin is warm and dry.   Neurological:      Mental Status: She is alert and oriented to person, place, and time.   Psychiatric:         Behavior: Behavior normal.         Judgment: Judgment normal.           Assessment & Plan   Problems Addressed this Visit    None  Visit Diagnoses     Fibroids    -  Primary    Encounter for surveillance of contraceptive pills          Diagnoses       Codes Comments    Fibroids    -  Primary ICD-10-CM: D21.9  ICD-9-CM: 215.9     Encounter for surveillance of contraceptive pills     ICD-10-CM: Z30.41  ICD-9-CM: V25.41       Discussed with patient that at this time I do not feel like Nexplanon is the best option for " her.  Discussed with patient most common reason to discontinue Nexplanon is irregular and/or heavy bleeding.  Also discussed with patient that at times Nexplanon can cause ovarian cyst.  At this time she elected to stay on her combined oral contraceptives.  All questions answered patient verbalized understanding of plan.       Bre Jordan, DO

## 2022-10-21 DIAGNOSIS — I10 ESSENTIAL HYPERTENSION: ICD-10-CM

## 2022-10-21 RX ORDER — LISINOPRIL 20 MG/1
10 TABLET ORAL DAILY
Qty: 45 TABLET | Refills: 1 | Status: SHIPPED | OUTPATIENT
Start: 2022-10-21 | End: 2023-03-29 | Stop reason: SDUPTHER

## 2022-10-27 ENCOUNTER — OFFICE VISIT (OUTPATIENT)
Dept: INTERNAL MEDICINE | Facility: CLINIC | Age: 42
End: 2022-10-27

## 2022-10-27 VITALS
OXYGEN SATURATION: 100 % | HEIGHT: 62 IN | SYSTOLIC BLOOD PRESSURE: 138 MMHG | BODY MASS INDEX: 29.7 KG/M2 | RESPIRATION RATE: 18 BRPM | HEART RATE: 91 BPM | DIASTOLIC BLOOD PRESSURE: 94 MMHG | WEIGHT: 161.4 LBS

## 2022-10-27 DIAGNOSIS — Z00.00 HEALTHCARE MAINTENANCE: ICD-10-CM

## 2022-10-27 DIAGNOSIS — I10 ESSENTIAL HYPERTENSION: Primary | ICD-10-CM

## 2022-10-27 PROCEDURE — 99213 OFFICE O/P EST LOW 20 MIN: CPT | Performed by: INTERNAL MEDICINE

## 2022-10-27 NOTE — PROGRESS NOTES
Chief Complaint   Patient presents with   • Hypertension     Answers for HPI/ROS submitted by the patient on 10/21/2022  What is the primary reason for your visit?: High Blood Pressure        History:  Danelle Estrada is a 42 y.o. female who presents today for evaluation of the above problems.      Danelle presents today for her 6-month follow-up.  Overall she is feeling good without any new issues or complaints.  She has lost 8 pounds by dieting since her last visit!    Her blood pressure is good.  Her lisinopril was changed from a 10 mg tablet to a 20 mg tablet due to a recall of the 10 mg dose.  This was sent in to her pharmacy on October 21.    HPI      ROS:  Review of Systems     As above      Current Outpatient Medications:   •  acetaminophen (TYLENOL) 500 MG tablet, Take 500 mg by mouth Every 6 (Six) Hours As Needed for Mild Pain ., Disp: , Rfl:   •  acyclovir (Zovirax) 400 MG tablet, Take 2 tablets by mouth 3 (Three) Times a Day. Take no more than 5 doses a day., Disp: 30 tablet, Rfl: 2  •  diphenhydrAMINE (BENADRYL) 25 mg capsule, Take 25 mg by mouth Every 6 (Six) Hours As Needed for Itching., Disp: , Rfl:   •  EPINEPHrine (EPIPEN) 0.3 MG/0.3ML solution auto-injector injection, Inject 0.3 mL into the appropriate muscle as directed by prescriber As Needed (anaphylaxis)., Disp: 1 each, Rfl: 0  •  lisinopril (PRINIVIL,ZESTRIL) 20 MG tablet, Take 0.5 tablets by mouth Daily., Disp: 45 tablet, Rfl: 1  •  Multiple Vitamins-Minerals (MULTIVITAMIN ADULTS PO), Take  by mouth., Disp: , Rfl:   •  norgestimate-ethinyl estradiol (ORTHO-CYCLEN) 0.25-35 MG-MCG per tablet, 1 po daily, Disp: 84 tablet, Rfl: 1  •  QUEtiapine (SEROquel) 200 MG tablet, Take 200 mg by mouth Every Night., Disp: , Rfl:   •  verapamil SR (CALAN-SR) 180 MG CR tablet, Take 1 tablet by mouth Daily., Disp: 90 tablet, Rfl: 1    Lab Results   Component Value Date    GLUCOSE 91 02/24/2022    BUN 10 02/24/2022    CREATININE 0.82 02/24/2022    EGFRIFAFRI 92  02/24/2022    BCR 12.2 02/24/2022    K 3.9 02/24/2022    CO2 23.1 02/24/2022    CALCIUM 9.2 02/24/2022    ALBUMIN 3.90 02/24/2022    AST 15 02/24/2022    ALT 9 02/24/2022       WBC   Date Value Ref Range Status   06/11/2021 5.42 3.40 - 10.80 10*3/mm3 Final     RBC   Date Value Ref Range Status   06/11/2021 4.89 3.77 - 5.28 10*6/mm3 Final     Hemoglobin   Date Value Ref Range Status   06/11/2021 14.6 12.0 - 15.9 g/dL Final     Hematocrit   Date Value Ref Range Status   06/11/2021 43.6 34.0 - 46.6 % Final     MCV   Date Value Ref Range Status   06/11/2021 89.2 79.0 - 97.0 fL Final     MCH   Date Value Ref Range Status   06/11/2021 29.9 26.6 - 33.0 pg Final     MCHC   Date Value Ref Range Status   06/11/2021 33.5 31.5 - 35.7 g/dL Final     RDW   Date Value Ref Range Status   06/11/2021 12.9 12.3 - 15.4 % Final     RDW-SD   Date Value Ref Range Status   06/11/2021 42.5 37.0 - 54.0 fl Final     MPV   Date Value Ref Range Status   06/11/2021 9.4 6.0 - 12.0 fL Final     Platelets   Date Value Ref Range Status   06/11/2021 315 140 - 450 10*3/mm3 Final     Neutrophil %   Date Value Ref Range Status   06/11/2021 51.7 42.7 - 76.0 % Final     Lymphocyte %   Date Value Ref Range Status   06/11/2021 35.2 19.6 - 45.3 % Final     Monocyte %   Date Value Ref Range Status   06/11/2021 10.7 5.0 - 12.0 % Final     Eosinophil %   Date Value Ref Range Status   06/11/2021 1.1 0.3 - 6.2 % Final     Basophil %   Date Value Ref Range Status   06/11/2021 1.1 0.0 - 1.5 % Final     Immature Grans %   Date Value Ref Range Status   06/11/2021 0.2 0.0 - 0.5 % Final     Neutrophils, Absolute   Date Value Ref Range Status   06/11/2021 2.80 1.70 - 7.00 10*3/mm3 Final     Lymphocytes, Absolute   Date Value Ref Range Status   06/11/2021 1.91 0.70 - 3.10 10*3/mm3 Final     Monocytes, Absolute   Date Value Ref Range Status   06/11/2021 0.58 0.10 - 0.90 10*3/mm3 Final     Eosinophils, Absolute   Date Value Ref Range Status   06/11/2021 0.06 0.00 - 0.40  "10*3/mm3 Final     Basophils, Absolute   Date Value Ref Range Status   06/11/2021 0.06 0.00 - 0.20 10*3/mm3 Final     Immature Grans, Absolute   Date Value Ref Range Status   06/11/2021 0.01 0.00 - 0.05 10*3/mm3 Final     nRBC   Date Value Ref Range Status   06/11/2021 0.0 0.0 - 0.2 /100 WBC Final         OBJECTIVE:  Visit Vitals  /94 (BP Location: Left arm, Patient Position: Sitting, Cuff Size: Adult)   Pulse 91   Resp 18   Ht 157.5 cm (62\")   Wt 73.2 kg (161 lb 6.4 oz)   SpO2 100%   BMI 29.52 kg/m²      Physical Exam  Vitals and nursing note reviewed.   Constitutional:       General: She is not in acute distress.     Appearance: She is well-developed. She is not diaphoretic.      Comments: Pleasant   HENT:      Head: Normocephalic and atraumatic.   Eyes:      Pupils: Pupils are equal, round, and reactive to light.   Neck:      Thyroid: No thyromegaly.      Trachea: Phonation normal.   Pulmonary:      Effort: No respiratory distress.   Skin:     Coloration: Skin is not pale.      Findings: No erythema.   Neurological:      Mental Status: She is alert.   Psychiatric:         Behavior: Behavior normal.         Thought Content: Thought content normal.         Judgment: Judgment normal.         Assessment/Plan    Diagnoses and all orders for this visit:    1. Essential hypertension (Primary)    2. Healthcare maintenance  -     Comprehensive Metabolic Panel; Future  -     Hemoglobin A1c; Future  -     Lipid Panel; Future  -     CBC (No Diff); Future    Other orders  -     verapamil SR (CALAN-SR) 180 MG CR tablet; Take 1 tablet by mouth Daily.  Dispense: 90 tablet; Refill: 1      Her blood pressure is well controlled.  We will continue lisinopril 10 mg, but the milligrams tablet was adjusted to 20 mg due to the recent recall.  I encouraged her to  the 20 mg tablet at her pharmacy today.  I sent a refill on her verapamil as well.    She has already received the influenza vaccine for the season    Will get " blood work couple days before her next visit in 6 months which will be her annual physical.  Follow-up sooner if indicated.      Return in about 6 months (around 4/27/2023) for Annual physical.      YVETTE Kessler MD  08:36 CDT  10/27/2022

## 2022-11-14 ENCOUNTER — LAB (OUTPATIENT)
Dept: LAB | Facility: HOSPITAL | Age: 42
End: 2022-11-14

## 2022-11-14 ENCOUNTER — TRANSCRIBE ORDERS (OUTPATIENT)
Dept: ADMINISTRATIVE | Facility: HOSPITAL | Age: 42
End: 2022-11-14

## 2022-11-14 DIAGNOSIS — Z79.899 ENCOUNTER FOR LONG-TERM (CURRENT) USE OF OTHER MEDICATIONS: Primary | ICD-10-CM

## 2022-11-14 DIAGNOSIS — Z79.899 ENCOUNTER FOR LONG-TERM (CURRENT) USE OF OTHER MEDICATIONS: ICD-10-CM

## 2022-11-14 LAB
ALBUMIN SERPL-MCNC: 4.4 G/DL (ref 3.5–5.2)
ALBUMIN/GLOB SERPL: 1.4 G/DL
ALP SERPL-CCNC: 67 U/L (ref 39–117)
ALT SERPL W P-5'-P-CCNC: 15 U/L (ref 1–33)
ANION GAP SERPL CALCULATED.3IONS-SCNC: 10 MMOL/L (ref 5–15)
AST SERPL-CCNC: 22 U/L (ref 1–32)
BASOPHILS # BLD AUTO: 0.04 10*3/MM3 (ref 0–0.2)
BASOPHILS NFR BLD AUTO: 0.7 % (ref 0–1.5)
BILIRUB SERPL-MCNC: 0.3 MG/DL (ref 0–1.2)
BUN SERPL-MCNC: 7 MG/DL (ref 6–20)
BUN/CREAT SERPL: 9 (ref 7–25)
CALCIUM SPEC-SCNC: 9.5 MG/DL (ref 8.6–10.5)
CHLORIDE SERPL-SCNC: 100 MMOL/L (ref 98–107)
CHOLEST SERPL-MCNC: 204 MG/DL (ref 0–200)
CO2 SERPL-SCNC: 23 MMOL/L (ref 22–29)
CREAT SERPL-MCNC: 0.78 MG/DL (ref 0.57–1)
DEPRECATED RDW RBC AUTO: 39.3 FL (ref 37–54)
EGFRCR SERPLBLD CKD-EPI 2021: 96.2 ML/MIN/1.73
EOSINOPHIL # BLD AUTO: 0.01 10*3/MM3 (ref 0–0.4)
EOSINOPHIL NFR BLD AUTO: 0.2 % (ref 0.3–6.2)
ERYTHROCYTE [DISTWIDTH] IN BLOOD BY AUTOMATED COUNT: 12.4 % (ref 12.3–15.4)
GLOBULIN UR ELPH-MCNC: 3.2 GM/DL
GLUCOSE SERPL-MCNC: 81 MG/DL (ref 65–99)
HBA1C MFR BLD: 5.3 % (ref 4.8–5.6)
HCT VFR BLD AUTO: 40.5 % (ref 34–46.6)
HDLC SERPL-MCNC: 66 MG/DL (ref 40–60)
HGB BLD-MCNC: 13.8 G/DL (ref 12–15.9)
IMM GRANULOCYTES # BLD AUTO: 0.01 10*3/MM3 (ref 0–0.05)
IMM GRANULOCYTES NFR BLD AUTO: 0.2 % (ref 0–0.5)
LDLC SERPL CALC-MCNC: 118 MG/DL (ref 0–100)
LDLC/HDLC SERPL: 1.75 {RATIO}
LYMPHOCYTES # BLD AUTO: 2.03 10*3/MM3 (ref 0.7–3.1)
LYMPHOCYTES NFR BLD AUTO: 37.9 % (ref 19.6–45.3)
MCH RBC QN AUTO: 29.7 PG (ref 26.6–33)
MCHC RBC AUTO-ENTMCNC: 34.1 G/DL (ref 31.5–35.7)
MCV RBC AUTO: 87.1 FL (ref 79–97)
MONOCYTES # BLD AUTO: 0.57 10*3/MM3 (ref 0.1–0.9)
MONOCYTES NFR BLD AUTO: 10.7 % (ref 5–12)
NEUTROPHILS NFR BLD AUTO: 2.69 10*3/MM3 (ref 1.7–7)
NEUTROPHILS NFR BLD AUTO: 50.3 % (ref 42.7–76)
NRBC BLD AUTO-RTO: 0 /100 WBC (ref 0–0.2)
PLATELET # BLD AUTO: 332 10*3/MM3 (ref 140–450)
PMV BLD AUTO: 9.2 FL (ref 6–12)
POTASSIUM SERPL-SCNC: 3.5 MMOL/L (ref 3.5–5.2)
PROT SERPL-MCNC: 7.6 G/DL (ref 6–8.5)
RBC # BLD AUTO: 4.65 10*6/MM3 (ref 3.77–5.28)
SODIUM SERPL-SCNC: 133 MMOL/L (ref 136–145)
TRIGL SERPL-MCNC: 111 MG/DL (ref 0–150)
VLDLC SERPL-MCNC: 20 MG/DL (ref 5–40)
WBC NRBC COR # BLD: 5.35 10*3/MM3 (ref 3.4–10.8)

## 2022-11-14 PROCEDURE — 80053 COMPREHEN METABOLIC PANEL: CPT

## 2022-11-14 PROCEDURE — 85025 COMPLETE CBC W/AUTO DIFF WBC: CPT

## 2022-11-14 PROCEDURE — 83036 HEMOGLOBIN GLYCOSYLATED A1C: CPT

## 2022-11-14 PROCEDURE — 80061 LIPID PANEL: CPT

## 2022-11-14 PROCEDURE — 36415 COLL VENOUS BLD VENIPUNCTURE: CPT

## 2023-01-24 ENCOUNTER — OFFICE VISIT (OUTPATIENT)
Dept: OBSTETRICS AND GYNECOLOGY | Facility: CLINIC | Age: 43
End: 2023-01-24
Payer: MEDICARE

## 2023-01-24 VITALS
SYSTOLIC BLOOD PRESSURE: 118 MMHG | HEIGHT: 62 IN | DIASTOLIC BLOOD PRESSURE: 72 MMHG | BODY MASS INDEX: 28.71 KG/M2 | WEIGHT: 156 LBS

## 2023-01-24 DIAGNOSIS — N92.1 MENORRHAGIA WITH IRREGULAR CYCLE: ICD-10-CM

## 2023-01-24 DIAGNOSIS — Z01.419 WOMEN'S ANNUAL ROUTINE GYNECOLOGICAL EXAMINATION: Primary | ICD-10-CM

## 2023-01-24 DIAGNOSIS — D21.9 FIBROIDS: ICD-10-CM

## 2023-01-24 DIAGNOSIS — Z12.11 SCREENING FOR COLON CANCER: ICD-10-CM

## 2023-01-24 DIAGNOSIS — Z30.41 ENCOUNTER FOR SURVEILLANCE OF CONTRACEPTIVE PILLS: ICD-10-CM

## 2023-01-24 DIAGNOSIS — Z12.31 ENCOUNTER FOR SCREENING MAMMOGRAM FOR MALIGNANT NEOPLASM OF BREAST: ICD-10-CM

## 2023-01-24 PROCEDURE — G0101 CA SCREEN;PELVIC/BREAST EXAM: HCPCS | Performed by: NURSE PRACTITIONER

## 2023-01-24 PROCEDURE — 99213 OFFICE O/P EST LOW 20 MIN: CPT | Performed by: NURSE PRACTITIONER

## 2023-01-24 RX ORDER — NORGESTIMATE AND ETHINYL ESTRADIOL 0.25-0.035
KIT ORAL
Qty: 84 TABLET | Refills: 2 | Status: SHIPPED | OUTPATIENT
Start: 2023-01-24

## 2023-01-24 NOTE — PROGRESS NOTES
Chief Complaint   Patient presents with   • Gynecologic Exam     Patient is here for annual GYN exam.  Last GYN exam and pap were 1/20/22 with [facility], and were normal.  Last mammogram was 12/12/22 BI-RADS 1, normal.  Pt denies pelvic pain, discharge, or gyn problems.  She is requesting refill on OCP.  She states it controls her cycles/cyst pain well.  Pt voices no other complaints or concerns at this time.   • Fibroids     TVUS today for Uterine Fibroids also.  Last evaluated 10/20/22 by Dr. Jordan.       History:  Danelle Estrada is a 42 y.o. female who presents today for follow-up for evaluation of the above:    HPI      Danelle Etsrada is a 42 y.o. female ,  who comes to the office today for annual GYN examination. Last menstrual period was 01/12/2023  and her last Pap smear was 01/20/2022, and was normal. She has no history of cervical dysplasia. She is currently on OCP for contraception and is doing well with them without complaints. Her medical history is reviewed.     She had ultrasound today to monitor uterine fibroids. Denies bleeding or pain.         ROS:  Review of Systems   Constitutional: Negative for fatigue and unexpected weight change.   HENT: Negative.    Eyes: Negative.    Respiratory: Negative.    Cardiovascular: Negative.    Gastrointestinal: Negative for abdominal pain, constipation and diarrhea.   Endocrine: Negative.    Genitourinary: Negative for difficulty urinating, dyspareunia, genital sores, menstrual problem, pelvic pain, vaginal bleeding, vaginal discharge and vaginal pain.   Musculoskeletal: Negative.    Skin: Negative.    Neurological: Negative.    Psychiatric/Behavioral: Negative.        Ms. Estrada  reports that she has never smoked. She has never used smokeless tobacco. She reports that she does not drink alcohol and does not use drugs.      Current Outpatient Medications:   •  acetaminophen (TYLENOL) 500 MG tablet, Take 500 mg by mouth Every 6 (Six) Hours As Needed for Mild Pain .,  "Disp: , Rfl:   •  acyclovir (Zovirax) 400 MG tablet, Take 2 tablets by mouth 3 (Three) Times a Day. Take no more than 5 doses a day., Disp: 30 tablet, Rfl: 2  •  diphenhydrAMINE (BENADRYL) 25 mg capsule, Take 25 mg by mouth Every 6 (Six) Hours As Needed for Itching., Disp: , Rfl:   •  EPINEPHrine (EPIPEN) 0.3 MG/0.3ML solution auto-injector injection, Inject 0.3 mL into the appropriate muscle as directed by prescriber As Needed (anaphylaxis)., Disp: 1 each, Rfl: 0  •  lisinopril (PRINIVIL,ZESTRIL) 20 MG tablet, Take 0.5 tablets by mouth Daily., Disp: 45 tablet, Rfl: 1  •  Multiple Vitamins-Minerals (MULTIVITAMIN ADULTS PO), Take  by mouth., Disp: , Rfl:   •  norgestimate-ethinyl estradiol (ORTHO-CYCLEN) 0.25-35 MG-MCG per tablet, 1 po daily, Disp: 84 tablet, Rfl: 2  •  QUEtiapine (SEROquel) 200 MG tablet, Take 200 mg by mouth Every Night., Disp: , Rfl:   •  verapamil SR (CALAN-SR) 180 MG CR tablet, Take 1 tablet by mouth Daily., Disp: 90 tablet, Rfl: 1      OBJECTIVE:  /72   Ht 157.5 cm (62\")   Wt 70.8 kg (156 lb)   LMP 01/12/2023 (Exact Date)   BMI 28.53 kg/m²    Physical Exam  Exam conducted with a chaperone present.   Constitutional:       Appearance: She is well-developed.   HENT:      Head: Normocephalic and atraumatic.   Eyes:      General: Lids are normal.      Conjunctiva/sclera: Conjunctivae normal.      Pupils: Pupils are equal, round, and reactive to light.   Neck:      Thyroid: No thyromegaly.   Cardiovascular:      Rate and Rhythm: Normal rate and regular rhythm.      Heart sounds: Normal heart sounds.   Pulmonary:      Effort: Pulmonary effort is normal.      Breath sounds: Normal breath sounds.   Chest:   Breasts:     Breasts are symmetrical.      Right: No inverted nipple, mass, nipple discharge, skin change or tenderness.      Left: No inverted nipple, mass, nipple discharge, skin change or tenderness.   Abdominal:      General: Bowel sounds are normal.      Palpations: Abdomen is soft. "   Genitourinary:     Exam position: Supine.      Labia:         Right: No rash, tenderness, lesion or injury.         Left: No rash, tenderness, lesion or injury.       Vagina: No signs of injury and foreign body. No vaginal discharge, erythema, tenderness or bleeding.      Cervix: No cervical motion tenderness, discharge or friability.      Uterus: Enlarged. Not deviated, not fixed and not tender.       Adnexa:         Right: No mass, tenderness or fullness.          Left: No mass, tenderness or fullness.        Rectum: Normal. No tenderness or external hemorrhoid.   Musculoskeletal:         General: Normal range of motion.      Cervical back: Normal range of motion and neck supple.   Skin:     General: Skin is warm and dry.   Neurological:      Mental Status: She is alert and oriented to person, place, and time.         Assessment/Plan    Diagnoses and all orders for this visit:    1. Women's annual routine gynecological examination (Primary)  Immunizations:      - Tetanus: Unknown or >10 years ago. Recommend to have at pharmacy or on injury.      - Influenza: recommended annually      - Pneumovax:once after age 65      - Prevnar: Once after age 65      - Zostavax: Once after age 60  Colon Cancer Screening: Due at 45  Mammogram: order placed. Due in December   PAP: due 2025  DEXA: DEXA scan at 65  COVID vaccine information is available at vaccine.[place].gov   2. Menorrhagia with irregular cycle  Stable on OCP    3. Fibroids  Enlarging by US which is reviewed with patient in the office today. F/u in 6 months.     4. Encounter for surveillance of contraceptive pills  -     norgestimate-ethinyl estradiol (ORTHO-CYCLEN) 0.25-35 MG-MCG per tablet; 1 po daily  Dispense: 84 tablet; Refill: 2    5. Screening for colon cancer  -     Ambulatory Referral to Gastroenterology         I have refilled her birth control for a year.  She will return in 6 months. In the meantime if she develops questions or problems, she will notify  the office.         An After Visit Summary was printed and given to the patient at discharge.  Return in about 6 months (around 7/24/2023) for with GYN US. Sooner if problems arise.          Clarissa CAVAZOS. 1/24/2023   Electronically Signed

## 2023-03-07 ENCOUNTER — OFFICE VISIT (OUTPATIENT)
Dept: GASTROENTEROLOGY | Facility: CLINIC | Age: 43
End: 2023-03-07
Payer: MEDICARE

## 2023-03-07 VITALS
OXYGEN SATURATION: 100 % | HEART RATE: 85 BPM | TEMPERATURE: 97.1 F | DIASTOLIC BLOOD PRESSURE: 82 MMHG | HEIGHT: 62 IN | WEIGHT: 160 LBS | BODY MASS INDEX: 29.44 KG/M2 | SYSTOLIC BLOOD PRESSURE: 142 MMHG

## 2023-03-07 DIAGNOSIS — Z12.11 ENCOUNTER FOR SCREENING FOR MALIGNANT NEOPLASM OF COLON: Primary | ICD-10-CM

## 2023-03-07 PROCEDURE — 3079F DIAST BP 80-89 MM HG: CPT | Performed by: NURSE PRACTITIONER

## 2023-03-07 PROCEDURE — 1160F RVW MEDS BY RX/DR IN RCRD: CPT | Performed by: NURSE PRACTITIONER

## 2023-03-07 PROCEDURE — 1159F MED LIST DOCD IN RCRD: CPT | Performed by: NURSE PRACTITIONER

## 2023-03-07 PROCEDURE — S0260 H&P FOR SURGERY: HCPCS | Performed by: NURSE PRACTITIONER

## 2023-03-07 PROCEDURE — 3077F SYST BP >= 140 MM HG: CPT | Performed by: NURSE PRACTITIONER

## 2023-03-07 NOTE — PROGRESS NOTES
Primary Physician: OLIVER Kessler MD    Chief Complaint   Patient presents with   • Colon Cancer Screening     Pt presents today for evaluation for screening colonoscopy        Subjective     Danelle Estrada is a 42 y.o. female.    HPI   Colon Screening  Pt here to set up 1st colonoscopy. No family hx colon cancer.  She is doing well with no changes in bowel pattern.  NO abdominal pain, rectal bleeding or diarrhea.      Past Medical History:   Diagnosis Date   • Allergic    • Bipolar disorder (HCC)    • Depression    • H/O schizophrenia    • Hyperlipidemia    • Hypertension        Past Surgical History:   Procedure Laterality Date   • EYE SURGERY     • RHINOPLASTY     • WISDOM TOOTH EXTRACTION          Current Outpatient Medications:   •  acetaminophen (TYLENOL) 500 MG tablet, Take 1 tablet by mouth Every 6 (Six) Hours As Needed for Mild Pain., Disp: , Rfl:   •  acyclovir (Zovirax) 400 MG tablet, Take 2 tablets by mouth 3 (Three) Times a Day. Take no more than 5 doses a day., Disp: 30 tablet, Rfl: 2  •  diphenhydrAMINE (BENADRYL) 25 mg capsule, Take 1 capsule by mouth Every 6 (Six) Hours As Needed for Itching., Disp: , Rfl:   •  EPINEPHrine (EPIPEN) 0.3 MG/0.3ML solution auto-injector injection, Inject 0.3 mL into the appropriate muscle as directed by prescriber As Needed (anaphylaxis)., Disp: 1 each, Rfl: 0  •  lisinopril (PRINIVIL,ZESTRIL) 20 MG tablet, Take 0.5 tablets by mouth Daily., Disp: 45 tablet, Rfl: 1  •  Multiple Vitamins-Minerals (MULTIVITAMIN ADULTS PO), Take 1 tablet by mouth Daily., Disp: , Rfl:   •  norgestimate-ethinyl estradiol (ORTHO-CYCLEN) 0.25-35 MG-MCG per tablet, 1 po daily (Patient taking differently: Take 1 tablet by mouth Daily. 1 po daily), Disp: 84 tablet, Rfl: 2  •  QUEtiapine (SEROquel) 200 MG tablet, Take 1 tablet by mouth Every Night., Disp: , Rfl:   •  verapamil SR (CALAN-SR) 180 MG CR tablet, Take 1 tablet by mouth Daily., Disp: 90 tablet, Rfl: 1  •  polyethylene glycol  "(GoLYTELY) 236 g solution, Take as directed split dose, Disp: 4000 mL, Rfl: 0    No Known Allergies    Social History     Socioeconomic History   • Marital status: Single   Tobacco Use   • Smoking status: Never   • Smokeless tobacco: Never   Vaping Use   • Vaping Use: Never used   Substance and Sexual Activity   • Alcohol use: Not Currently   • Drug use: No   • Sexual activity: Not Currently     Partners: Male     Birth control/protection: Condom, Abstinence, Birth control pill       Family History   Problem Relation Age of Onset   • No Known Problems Mother    • No Known Problems Father    • No Known Problems Sister    • No Known Problems Brother    • Breast cancer Paternal Aunt    • Ovarian cancer Maternal Grandmother    • Uterine cancer Neg Hx    • Colon cancer Neg Hx    • Melanoma Neg Hx    • Colon polyps Neg Hx    • Esophageal cancer Neg Hx    • Liver cancer Neg Hx    • Stomach cancer Neg Hx    • Liver disease Neg Hx    • Rectal cancer Neg Hx        Review of Systems   Constitutional: Negative for unexpected weight change.   Respiratory: Negative for shortness of breath.    Cardiovascular: Negative for chest pain.       Objective     /82 (BP Location: Left arm, Patient Position: Sitting, Cuff Size: Adult)   Pulse 85   Temp 97.1 °F (36.2 °C) (Infrared)   Ht 157.5 cm (62\")   Wt 72.6 kg (160 lb)   SpO2 100%   Breastfeeding No   BMI 29.26 kg/m²     Physical Exam  Vitals reviewed.   Constitutional:       Appearance: Normal appearance.   Cardiovascular:      Rate and Rhythm: Normal rate and regular rhythm.      Heart sounds: Normal heart sounds.   Pulmonary:      Effort: Pulmonary effort is normal.      Breath sounds: Normal breath sounds.   Neurological:      Mental Status: She is alert.         Lab Results - Last 18 Months   Lab Units 11/14/22  1128 02/24/22  1119   GLUCOSE mg/dL 81 91   BUN mg/dL 7 10   CREATININE mg/dL 0.78 0.82   SODIUM mmol/L 133* 138   POTASSIUM mmol/L 3.5 3.9   CHLORIDE mmol/L " 100 104   CO2 mmol/L 23.0 23.1   TOTAL PROTEIN g/dL 7.6 6.8   ALBUMIN g/dL 4.40 3.90   ALT (SGPT) U/L 15 9   AST (SGOT) U/L 22 15   ALK PHOS U/L 67 76   BILIRUBIN mg/dL 0.3 0.2   GLOBULIN gm/dL 3.2 2.9       Lab Results - Last 18 Months   Lab Units 11/14/22  1128   HEMOGLOBIN g/dL 13.8   HEMATOCRIT % 40.5   MCV fL 87.1   WBC 10*3/mm3 5.35   RDW % 12.4   MPV fL 9.2   PLATELETS 10*3/mm3 332         IMPRESSION/PLAN:    Assessment & Plan      Problem List Items Addressed This Visit        Gastrointestinal Abdominal     Encounter for screening for malignant neoplasm of colon - Primary    Relevant Medications    polyethylene glycol (GoLYTELY) 236 g solution     Colonoscopy per Dr Cristian Palomares Prep            ..The risks, benefits, and alternatives of colonoscopy were reviewed with the patient today.  Risks including perforation of the colon possibly requiring surgery or colostomy.  Additional risks include risk of bleeding from biopsies or removal of colon tissue.  There is also the risk of a drug reaction or problems with anesthesia.  This will be discussed with the further by the anesthesia team on the day of the procedure.  Lastly there is a possibility of missing a colon polyp or cancer.  The benefits include the diagnosis and management of disease of the colon and rectum.  Alternatives to colonoscopy include barium enema, laboratory testing, radiographic evaluation, or no intervention.  The patient verbalizes understanding and agrees.    In accordance with requirements under the Affordable Care Act, [facility] has provided pricing for all hospital services and items on each of its websites. However, a patient's actual cost may differ based on the services the patient receives to meet individual healthcare needs and based on the benefits provided under the patient’s insurance coverage.        Gisela Persaud, APRN  03/07/23  10:09 CST    Part of this note may be an electronic transcription/translation of  spoken language to printed text.

## 2023-03-08 ENCOUNTER — PATIENT ROUNDING (BHMG ONLY) (OUTPATIENT)
Dept: GASTROENTEROLOGY | Facility: CLINIC | Age: 43
End: 2023-03-08
Payer: MEDICARE

## 2023-03-08 NOTE — PROGRESS NOTES
March 8, 2023    Hello, may I speak with Danelle Estrada?    My name is       I am  with [facility]  [address], [address]  [place] [address] [phone].    Before we get started may I verify your date of birth? 1980    I am calling to officially welcome you to our practice and ask about your recent visit. Is this a good time to talk? yes    Tell me about your visit with us. What things went well?  Pt was happy with everything and was glad to be able to book it when she turns 43 in September and not have to come back in for an office visit.       We're always looking for ways to make our patients' experiences even better. Do you have recommendations on ways we may improve?  no    Overall were you satisfied with your first visit to our practice? yes       I appreciate you taking the time to speak with me today. Is there anything else I can do for you? no      Thank you, and have a great day.

## 2023-03-15 ENCOUNTER — OFFICE VISIT (OUTPATIENT)
Dept: OBSTETRICS AND GYNECOLOGY | Facility: CLINIC | Age: 43
End: 2023-03-15
Payer: MEDICARE

## 2023-03-15 VITALS
SYSTOLIC BLOOD PRESSURE: 138 MMHG | BODY MASS INDEX: 29.26 KG/M2 | WEIGHT: 159 LBS | HEIGHT: 62 IN | DIASTOLIC BLOOD PRESSURE: 80 MMHG

## 2023-03-15 DIAGNOSIS — Z31.9 INFERTILITY MANAGEMENT: Primary | ICD-10-CM

## 2023-03-15 PROCEDURE — 3075F SYST BP GE 130 - 139MM HG: CPT | Performed by: OBSTETRICS & GYNECOLOGY

## 2023-03-15 PROCEDURE — 3079F DIAST BP 80-89 MM HG: CPT | Performed by: OBSTETRICS & GYNECOLOGY

## 2023-03-15 PROCEDURE — 99213 OFFICE O/P EST LOW 20 MIN: CPT | Performed by: OBSTETRICS & GYNECOLOGY

## 2023-03-15 NOTE — PROGRESS NOTES
"Subjective   Danelle Estrada is a 42 y.o. female.     Chief Complaint   Patient presents with   • Fertility     Patient here today with questions regarding IVF.        History of Present Illness  42-year-old female  0 para 0 presents to discuss IVF.  Patient has known history of fibroids and has been on combined oral contraceptives for cycle regulation.  She voices no other new complaints or concerns at this time.       Review of Systems   Genitourinary: Negative for menstrual problem.       Objective   /80   Ht 157.5 cm (62\")   Wt 72.1 kg (159 lb)   LMP 2023 (Approximate)   BMI 29.08 kg/m²   Patient's last menstrual period was 2023 (approximate).  Physical Exam  Vitals and nursing note reviewed.   Constitutional:       General: She is not in acute distress.     Appearance: She is well-developed.   HENT:      Head: Normocephalic and atraumatic.   Eyes:      Conjunctiva/sclera: Conjunctivae normal.   Pulmonary:      Effort: Pulmonary effort is normal.   Musculoskeletal:         General: Normal range of motion.      Cervical back: Normal range of motion and neck supple.   Skin:     General: Skin is warm and dry.   Neurological:      Mental Status: She is alert and oriented to person, place, and time.   Psychiatric:         Behavior: Behavior normal.         Judgment: Judgment normal.       Assessment & Plan   Problems Addressed this Visit    None  Visit Diagnoses     Infertility management    -  Primary      Diagnoses       Codes Comments    Infertility management    -  Primary ICD-10-CM: Z31.9  ICD-9-CM: V26.9       Discussed with patient that I do not perform IVF.  Offered to refer patient to reproductive endocrinology.  At this time patient declined.  We will continue with current management of combined oral contraceptive pills.  All questions answered patient verbalized understanding of plan.       Bre Jordan, DO  "

## 2023-03-29 ENCOUNTER — TELEPHONE (OUTPATIENT)
Dept: INTERNAL MEDICINE | Facility: CLINIC | Age: 43
End: 2023-03-29
Payer: MEDICARE

## 2023-03-29 DIAGNOSIS — I10 ESSENTIAL HYPERTENSION: ICD-10-CM

## 2023-03-29 RX ORDER — ACYCLOVIR 400 MG/1
800 TABLET ORAL 3 TIMES DAILY
Qty: 60 TABLET | Refills: 5 | Status: SHIPPED | OUTPATIENT
Start: 2023-03-29

## 2023-03-29 RX ORDER — LISINOPRIL 20 MG/1
10 TABLET ORAL DAILY
Qty: 45 TABLET | Refills: 1 | Status: SHIPPED | OUTPATIENT
Start: 2023-03-29

## 2023-03-29 NOTE — TELEPHONE ENCOUNTER
Caller: Opal Estradaa R    Relationship: Self    Best call back number:   [phone]    Requested Prescriptions:   Requested Prescriptions     Pending Prescriptions Disp Refills   • lisinopril (PRINIVIL,ZESTRIL) 20 MG tablet 45 tablet 1     Sig: Take 0.5 tablets by mouth Daily.   • acyclovir (Zovirax) 400 MG tablet 30 tablet 2     Sig: Take 2 tablets by mouth 3 (Three) Times a Day. Take no more than 5 doses a day.   • verapamil SR (CALAN-SR) 180 MG CR tablet 90 tablet 1     Sig: Take 1 tablet by mouth Daily.      Pharmacy where request should be sent: [id number] - [facility] [address] [phone] [facility] [phone]      Last office visit with prescribing clinician: 10/27/2022   Last telemedicine visit with prescribing clinician: 2023   Next office visit with prescribing clinician: 2023     Additional details provided by patient:  PATIENT REPORTEDLY HAS NO LISINOPRIL LEFT, BUT HAS 2 WEEKS OF VERAPAMIL LEFT & ACYCLOVIR HAS  (STILL WANTS REFILL IN ORDER TO PREVENT FEVER BLISTERS)    Does the patient have less than a 3 day supply:  [x] Yes  [] No    Would you like a call back once the refill request has been completed: [x] Yes [] No    If the office needs to give you a call back, can they leave a voicemail: [x] Yes [] No    Red Barron Rep   23 08:16 CDT

## 2023-04-21 DIAGNOSIS — Z30.41 ENCOUNTER FOR SURVEILLANCE OF CONTRACEPTIVE PILLS: ICD-10-CM

## 2023-04-21 RX ORDER — NORGESTIMATE AND ETHINYL ESTRADIOL 0.25-0.035
KIT ORAL
Qty: 28 TABLET | OUTPATIENT
Start: 2023-04-21

## 2023-04-25 ENCOUNTER — LAB (OUTPATIENT)
Dept: LAB | Facility: HOSPITAL | Age: 43
End: 2023-04-25
Payer: COMMERCIAL

## 2023-04-25 DIAGNOSIS — Z00.00 HEALTHCARE MAINTENANCE: ICD-10-CM

## 2023-04-25 LAB
ALBUMIN SERPL-MCNC: 4 G/DL (ref 3.5–5.2)
ALBUMIN/GLOB SERPL: 1.2 G/DL
ALP SERPL-CCNC: 67 U/L (ref 39–117)
ALT SERPL W P-5'-P-CCNC: 11 U/L (ref 1–33)
ANION GAP SERPL CALCULATED.3IONS-SCNC: 10 MMOL/L (ref 5–15)
AST SERPL-CCNC: 17 U/L (ref 1–32)
BILIRUB SERPL-MCNC: 0.3 MG/DL (ref 0–1.2)
BUN SERPL-MCNC: 10 MG/DL (ref 6–20)
BUN/CREAT SERPL: 10.6 (ref 7–25)
CALCIUM SPEC-SCNC: 8.9 MG/DL (ref 8.6–10.5)
CHLORIDE SERPL-SCNC: 104 MMOL/L (ref 98–107)
CHOLEST SERPL-MCNC: 192 MG/DL (ref 0–200)
CO2 SERPL-SCNC: 24 MMOL/L (ref 22–29)
CREAT SERPL-MCNC: 0.94 MG/DL (ref 0.57–1)
DEPRECATED RDW RBC AUTO: 38 FL (ref 37–54)
EGFRCR SERPLBLD CKD-EPI 2021: 76.9 ML/MIN/1.73
ERYTHROCYTE [DISTWIDTH] IN BLOOD BY AUTOMATED COUNT: 11.9 % (ref 12.3–15.4)
GLOBULIN UR ELPH-MCNC: 3.3 GM/DL
GLUCOSE SERPL-MCNC: 83 MG/DL (ref 65–99)
HBA1C MFR BLD: 5.1 % (ref 4.8–5.6)
HCT VFR BLD AUTO: 41.5 % (ref 34–46.6)
HDLC SERPL-MCNC: 72 MG/DL (ref 40–60)
HGB BLD-MCNC: 13.9 G/DL (ref 12–15.9)
LDLC SERPL CALC-MCNC: 103 MG/DL (ref 0–100)
LDLC/HDLC SERPL: 1.4 {RATIO}
MCH RBC QN AUTO: 29.6 PG (ref 26.6–33)
MCHC RBC AUTO-ENTMCNC: 33.5 G/DL (ref 31.5–35.7)
MCV RBC AUTO: 88.5 FL (ref 79–97)
PLATELET # BLD AUTO: 340 10*3/MM3 (ref 140–450)
PMV BLD AUTO: 9.3 FL (ref 6–12)
POTASSIUM SERPL-SCNC: 3.9 MMOL/L (ref 3.5–5.2)
PROT SERPL-MCNC: 7.3 G/DL (ref 6–8.5)
RBC # BLD AUTO: 4.69 10*6/MM3 (ref 3.77–5.28)
SODIUM SERPL-SCNC: 138 MMOL/L (ref 136–145)
TRIGL SERPL-MCNC: 95 MG/DL (ref 0–150)
VLDLC SERPL-MCNC: 17 MG/DL (ref 5–40)
WBC NRBC COR # BLD: 5.64 10*3/MM3 (ref 3.4–10.8)

## 2023-04-25 PROCEDURE — 80061 LIPID PANEL: CPT

## 2023-04-25 PROCEDURE — 85027 COMPLETE CBC AUTOMATED: CPT

## 2023-04-25 PROCEDURE — 83036 HEMOGLOBIN GLYCOSYLATED A1C: CPT

## 2023-04-25 PROCEDURE — 80053 COMPREHEN METABOLIC PANEL: CPT

## 2023-04-25 PROCEDURE — 36415 COLL VENOUS BLD VENIPUNCTURE: CPT

## 2023-05-02 ENCOUNTER — OFFICE VISIT (OUTPATIENT)
Dept: INTERNAL MEDICINE | Facility: CLINIC | Age: 43
End: 2023-05-02
Payer: COMMERCIAL

## 2023-05-02 VITALS
WEIGHT: 156.3 LBS | DIASTOLIC BLOOD PRESSURE: 86 MMHG | SYSTOLIC BLOOD PRESSURE: 120 MMHG | HEIGHT: 62 IN | HEART RATE: 97 BPM | BODY MASS INDEX: 28.76 KG/M2 | TEMPERATURE: 97.9 F | OXYGEN SATURATION: 99 %

## 2023-05-02 DIAGNOSIS — Z13.31 DEPRESSION SCREEN: ICD-10-CM

## 2023-05-02 DIAGNOSIS — Z91.81 AT LOW RISK FOR FALL: ICD-10-CM

## 2023-05-02 DIAGNOSIS — Z00.00 INITIAL MEDICARE ANNUAL WELLNESS VISIT: Primary | ICD-10-CM

## 2023-05-02 DIAGNOSIS — I10 ESSENTIAL HYPERTENSION: ICD-10-CM

## 2023-05-02 RX ORDER — EPINEPHRINE 0.3 MG/.3ML
0.3 INJECTION SUBCUTANEOUS AS NEEDED
Qty: 1 EACH | Refills: 0 | Status: SHIPPED | OUTPATIENT
Start: 2023-05-02

## 2023-05-02 RX ORDER — LISINOPRIL 20 MG/1
10 TABLET ORAL DAILY
Qty: 45 TABLET | Refills: 3 | Status: SHIPPED | OUTPATIENT
Start: 2023-05-02

## 2023-05-02 RX ORDER — QUETIAPINE FUMARATE 400 MG/1
TABLET, FILM COATED ORAL
COMMUNITY
Start: 2023-04-03

## 2023-05-02 NOTE — PROGRESS NOTES
The ABCs of the Annual Wellness Visit  Initial Medicare Wellness Visit    Subjective     Danelle Estrada is a 42 y.o. female who presents for an Initial Medicare Wellness Visit.    She feels well without any new issues or complaints.    She reports having Medicare for over 1 year, but has never had a Medicare wellness visit.    The following portions of the patient's history were reviewed and   updated as appropriate: allergies, current medications, past family history, past medical history, past social history, past surgical history and problem list.     Compared to one year ago, the patient feels her physical   health is the same.    Compared to one year ago, the patient feels her mental   health is the same.    Recent Hospitalizations:  She was not admitted to the hospital during the last year.       Current Medical Providers:  Patient Care Team:  OLIVER Kessler MD as PCP - General (Internal Medicine)  Bre Jordan DO as Consulting Physician (Obstetrics and Gynecology)  Larisa Foster MD as Consulting Physician (Gastroenterology)    Outpatient Medications Prior to Visit   Medication Sig Dispense Refill   • acetaminophen (TYLENOL) 500 MG tablet Take 1 tablet by mouth Every 6 (Six) Hours As Needed for Mild Pain.     • acyclovir (Zovirax) 400 MG tablet Take 2 tablets by mouth 3 (Three) Times a Day. Take no more than 5 doses a day. 60 tablet 5   • diphenhydrAMINE (BENADRYL) 25 mg capsule Take 1 capsule by mouth Every 6 (Six) Hours As Needed for Itching.     • Multiple Vitamins-Minerals (MULTIVITAMIN ADULTS PO) Take 1 tablet by mouth Daily.     • norgestimate-ethinyl estradiol (ORTHO-CYCLEN) 0.25-35 MG-MCG per tablet 1 po daily (Patient taking differently: Take 1 tablet by mouth Daily. 1 po daily) 84 tablet 2   • polyethylene glycol (GoLYTELY) 236 g solution Take as directed split dose 4000 mL 0   • EPINEPHrine (EPIPEN) 0.3 MG/0.3ML solution auto-injector injection Inject 0.3 mL into the appropriate  "muscle as directed by prescriber As Needed (anaphylaxis). 1 each 0   • lisinopril (PRINIVIL,ZESTRIL) 20 MG tablet Take 0.5 tablets by mouth Daily. 45 tablet 1   • QUEtiapine (SEROquel) 200 MG tablet Take 1 tablet by mouth Every Night.     • verapamil SR (CALAN-SR) 180 MG CR tablet Take 1 tablet by mouth Daily. 90 tablet 1   • QUEtiapine (SEROquel) 400 MG tablet        No facility-administered medications prior to visit.       No opioid medication identified on active medication list. I have reviewed chart for other potential  high risk medication/s and harmful drug interactions in the elderly.          Aspirin is not on active medication list.  Aspirin use is not indicated based on review of current medical condition/s. Risk of harm outweighs potential benefits.  .    The 10-year ASCVD risk score (Chavez SMITH, et al., 2019) is: 0.9%    Values used to calculate the score:      Age: 42 years      Sex: Female      Is Non- : Yes      Diabetic: No      Tobacco smoker: No      Systolic Blood Pressure: 120 mmHg      Is BP treated: Yes      HDL Cholesterol: 72 mg/dL      Total Cholesterol: 192 mg/dL    Patient Active Problem List   Diagnosis   • Essential hypertension   • Chronic midline low back pain without sciatica   • Schizophrenia   • DDD (degenerative disc disease), lumbar   • Encounter for screening for malignant neoplasm of colon     Advance Care Planning   Advance Care Planning     Advance Directive is on file.  ACP discussion was held with the patient during this visit. Patient has an advance directive in EMR which is still valid.        Objective    Vitals:    05/02/23 0825   BP: 120/86   BP Location: Left arm   Patient Position: Sitting   Cuff Size: Adult   Pulse: 97   Temp: 97.9 °F (36.6 °C)   TempSrc: Temporal   SpO2: 99%   Weight: 70.9 kg (156 lb 4.8 oz)   Height: 157.5 cm (62\")   PainSc: 0-No pain     Estimated body mass index is 28.59 kg/m² as calculated from the following:    Height " "as of this encounter: 157.5 cm (62\").    Weight as of this encounter: 70.9 kg (156 lb 4.8 oz).    BMI is >= 25 and <30. (Overweight) The following options were offered after discussion;: exercise counseling/recommendations      Does the patient have evidence of cognitive impairment?   No    Lab Results   Component Value Date    TRIG 95 2023    HDL 72 (H) 2023     (H) 2023    VLDL 17 2023    HGBA1C 5.10 2023        HEALTH RISK ASSESSMENT    Smoking Status:  Social History     Tobacco Use   Smoking Status Never   Smokeless Tobacco Never     Alcohol Consumption:  Social History     Substance and Sexual Activity   Alcohol Use Not Currently     Fall Risk Screen:    JOSEFINA Fall Risk Assessment was completed, and patient is at LOW risk for falls.Assessment completed on:2023    Depression Screen:       2023     8:25 AM   PHQ-2/PHQ-9 Depression Screening   Little Interest or Pleasure in Doing Things 0-->not at all   Feeling Down, Depressed or Hopeless 0-->not at all   PHQ-9: Brief Depression Severity Measure Score 0       Health Habits and Functional and Cognitive Screenin/2/2023     8:26 AM   Functional & Cognitive Status   Current Diet Well Balanced Diet   Dental Exam Up to date   Eye Exam Up to date   Exercise (times per week) 7 times per week   Current Exercises Include Treadmill   Do you need help using the phone?  No   Are you deaf or do you have serious difficulty hearing?  No   Do you need help with transportation? No   Do you need help shopping? No   Do you need help preparing meals?  No   Do you need help with housework?  No   Do you need help with laundry? No   Do you need help taking your medications? No   Do you need help managing money? No   Do you ever drive or ride in a car without wearing a seat belt? No   Have you felt unusual stress, anger or loneliness in the last month? No   Who do you live with? Alone   If you need help, do you have trouble finding " someone available to you? No   Have you been bothered in the last four weeks by sexual problems? No   Do you have difficulty concentrating, remembering or making decisions? No       Age-appropriate Screening Schedule:  Refer to the list below for future screening recommendations based on patient's age, sex and/or medical conditions. Orders for these recommended tests are listed in the plan section. The patient has been provided with a written plan.    Health Maintenance   Topic Date Due   • ANNUAL WELLNESS VISIT  02/24/2023   • INFLUENZA VACCINE  08/01/2023   • LIPID PANEL  04/25/2024   • PAP SMEAR  01/20/2025   • TDAP/TD VACCINES (3 - Td or Tdap) 08/29/2032   • HEPATITIS C SCREENING  Completed   • COVID-19 Vaccine  Completed   • Pneumococcal Vaccine 0-64  Aged Out          CMS Preventative Services Quick Reference  Risk Factors Identified During Encounter    None Identified    The above risks/problems have been discussed with the patient.  Pertinent information has been shared with the patient in the After Visit Summary.  An After Visit Summary and PPPS were made available to the patient.  Diagnoses and all orders for this visit:    1. Initial Medicare annual wellness visit (Primary)    2. At low risk for fall    3. Depression screen    4. Essential hypertension  -     verapamil SR (CALAN-SR) 180 MG CR tablet; Take 1 tablet by mouth Daily.  Dispense: 90 tablet; Refill: 3  -     lisinopril (PRINIVIL,ZESTRIL) 20 MG tablet; Take 0.5 tablets by mouth Daily.  Dispense: 45 tablet; Refill: 3    Other orders  -     EPINEPHrine (EPIPEN) 0.3 MG/0.3ML solution auto-injector injection; Inject 0.3 mL into the appropriate muscle as directed by prescriber As Needed (anaphylaxis).  Dispense: 1 each; Refill: 0      Danelle is doing very well overall.  Reviewed her recent labs and they all look very good.  Cholesterol was slightly elevated but her LDL to HDL ratio was 1.4 and her 10-year ASCVD risk was quite low at 0.9%.    She  reports being diagnosed with hypothyroidism in the past and would like to get a TSH ordered with her next labs.    She had a mammogram on 7/12/2022 at [facility]    She will be eligible for colorectal cancer screening when she turns 43 in 5 months.  I will discuss further at the next visit.    Follow Up:  6-month recheck and next Medicare Wellness visit to be scheduled in 1 year.

## 2023-05-10 ENCOUNTER — TELEPHONE (OUTPATIENT)
Dept: GASTROENTEROLOGY | Facility: CLINIC | Age: 43
End: 2023-05-10
Payer: MEDICARE

## 2023-05-10 NOTE — TELEPHONE ENCOUNTER
Pt called to cx her colonoscopy scheduled on 9/18. I asked her when she would like to r/s to and she tells me she wants to call back at a later time to r/s. Placing pt in recall to follow up.

## 2023-07-26 ENCOUNTER — OFFICE VISIT (OUTPATIENT)
Dept: OBSTETRICS AND GYNECOLOGY | Facility: CLINIC | Age: 43
End: 2023-07-26
Payer: MEDICARE

## 2023-07-26 VITALS
BODY MASS INDEX: 28.71 KG/M2 | WEIGHT: 156 LBS | DIASTOLIC BLOOD PRESSURE: 84 MMHG | SYSTOLIC BLOOD PRESSURE: 122 MMHG | HEIGHT: 62 IN

## 2023-07-26 DIAGNOSIS — D25.9 UTERINE LEIOMYOMA, UNSPECIFIED LOCATION: Primary | ICD-10-CM

## 2023-07-26 DIAGNOSIS — Z30.41 ENCOUNTER FOR SURVEILLANCE OF CONTRACEPTIVE PILLS: ICD-10-CM

## 2023-07-26 RX ORDER — NORGESTIMATE AND ETHINYL ESTRADIOL 0.25-0.035
1 KIT ORAL DAILY
Qty: 84 TABLET | Refills: 3 | Status: SHIPPED | OUTPATIENT
Start: 2023-07-26

## 2023-07-26 RX ORDER — QUETIAPINE FUMARATE 200 MG/1
TABLET, FILM COATED ORAL
COMMUNITY
Start: 2023-06-01

## 2023-07-26 NOTE — PROGRESS NOTES
Chief Complaint   Patient presents with    Fibroids     Pt here to follow up on uterine fibroids they have been watching for some time. TVUS in office today to follow up on these.  Pt denies current pelvic pain, abnormal vaginal bleeding or discharge.  On OCP Ortho Cyclen, requesting refills today.       History:  Danelle Estrada is a 42 y.o. female who presents today for follow-up for evaluation of the above:    HPI    Patient presents today for F/u for fibroids by ultrasound. She denies current pelvic plan or heavy bleeding.   Doing well on her current OCP        ROS:  Review of Systems   Constitutional: Negative.    HENT: Negative.     Eyes: Negative.    Respiratory: Negative.     Cardiovascular: Negative.    Gastrointestinal: Negative.    Endocrine: Negative.    Genitourinary: Negative.    Musculoskeletal: Negative.    Skin: Negative.    Neurological: Negative.    Psychiatric/Behavioral: Negative.       Ms. Estrada  reports that she has never smoked. She has never used smokeless tobacco. She reports that she does not currently use alcohol. She reports that she does not use drugs.      Current Outpatient Medications:     acetaminophen (TYLENOL) 500 MG tablet, Take 1 tablet by mouth Every 6 (Six) Hours As Needed for Mild Pain., Disp: , Rfl:     acyclovir (Zovirax) 400 MG tablet, Take 2 tablets by mouth 3 (Three) Times a Day. Take no more than 5 doses a day., Disp: 60 tablet, Rfl: 5    diphenhydrAMINE (BENADRYL) 25 mg capsule, Take 1 capsule by mouth Every 6 (Six) Hours As Needed for Itching., Disp: , Rfl:     EPINEPHrine (EPIPEN) 0.3 MG/0.3ML solution auto-injector injection, Inject 0.3 mL into the appropriate muscle as directed by prescriber As Needed (anaphylaxis)., Disp: 1 each, Rfl: 0    lisinopril (PRINIVIL,ZESTRIL) 20 MG tablet, Take 0.5 tablets by mouth Daily., Disp: 45 tablet, Rfl: 3    Multiple Vitamins-Minerals (MULTIVITAMIN ADULTS PO), Take 1 tablet by mouth Daily., Disp: , Rfl:     norgestimate-ethinyl  "estradiol (ORTHO-CYCLEN) 0.25-35 MG-MCG per tablet, Take 1 tablet by mouth Daily. 1 po daily, Disp: 84 tablet, Rfl: 3    QUEtiapine (SEROquel) 200 MG tablet, , Disp: , Rfl:     verapamil SR (CALAN-SR) 180 MG CR tablet, Take 1 tablet by mouth Daily., Disp: 90 tablet, Rfl: 3      OBJECTIVE:  /84   Ht 157.5 cm (62\")   Wt 70.8 kg (156 lb)   LMP 07/26/2023 (Exact Date)   BMI 28.53 kg/m²    Physical Exam  Vitals reviewed.   Constitutional:       Appearance: She is well-developed.   HENT:      Head: Normocephalic and atraumatic.   Eyes:      Pupils: Pupils are equal, round, and reactive to light.   Cardiovascular:      Rate and Rhythm: Normal rate and regular rhythm.      Heart sounds: Normal heart sounds.   Pulmonary:      Effort: Pulmonary effort is normal.      Breath sounds: Normal breath sounds.   Musculoskeletal:         General: Normal range of motion.      Cervical back: Normal range of motion and neck supple.   Skin:     General: Skin is warm and dry.   Neurological:      Mental Status: She is alert and oriented to person, place, and time.       Assessment/Plan    Diagnoses and all orders for this visit:    1. Uterine leiomyoma, unspecified location (Primary)  Comments:  stable from prior US   she is on OCP  discussed that she may discuss surgical options with MD if she has pelvic pain or bleeding occur.    2. Encounter for surveillance of contraceptive pills  -     norgestimate-ethinyl estradiol (ORTHO-CYCLEN) 0.25-35 MG-MCG per tablet; Take 1 tablet by mouth Daily. 1 po daily  Dispense: 84 tablet; Refill: 3    Discussed risk factors with OCP  She is not a current smoker       An After Visit Summary was printed and given to the patient at discharge.  Return in about 6 months (around 1/26/2024) for Annual physical. Sooner if problems arise.          Clarissa CAVAZOS. 7/26/2023   Electronically Signed  "

## 2023-08-28 ENCOUNTER — LAB (OUTPATIENT)
Dept: LAB | Facility: HOSPITAL | Age: 43
End: 2023-08-28
Payer: MEDICARE

## 2023-08-28 ENCOUNTER — TRANSCRIBE ORDERS (OUTPATIENT)
Dept: ADMINISTRATIVE | Facility: HOSPITAL | Age: 43
End: 2023-08-28
Payer: MEDICARE

## 2023-08-28 DIAGNOSIS — Z79.899 ENCOUNTER FOR LONG-TERM (CURRENT) USE OF OTHER MEDICATIONS: Primary | ICD-10-CM

## 2023-08-28 DIAGNOSIS — Z79.899 ENCOUNTER FOR LONG-TERM (CURRENT) USE OF OTHER MEDICATIONS: ICD-10-CM

## 2023-08-28 LAB
ALBUMIN SERPL-MCNC: 4.5 G/DL (ref 3.5–5.2)
ALBUMIN/GLOB SERPL: 1.4 G/DL
ALP SERPL-CCNC: 78 U/L (ref 39–117)
ALT SERPL W P-5'-P-CCNC: 15 U/L (ref 1–33)
ANION GAP SERPL CALCULATED.3IONS-SCNC: 13 MMOL/L (ref 5–15)
AST SERPL-CCNC: 24 U/L (ref 1–32)
BASOPHILS # BLD AUTO: 0.05 10*3/MM3 (ref 0–0.2)
BASOPHILS NFR BLD AUTO: 0.8 % (ref 0–1.5)
BILIRUB SERPL-MCNC: 0.4 MG/DL (ref 0–1.2)
BUN SERPL-MCNC: 10 MG/DL (ref 6–20)
BUN/CREAT SERPL: 13.9 (ref 7–25)
CALCIUM SPEC-SCNC: 9.5 MG/DL (ref 8.6–10.5)
CHLORIDE SERPL-SCNC: 105 MMOL/L (ref 98–107)
CHOLEST SERPL-MCNC: 237 MG/DL (ref 0–200)
CO2 SERPL-SCNC: 20 MMOL/L (ref 22–29)
CREAT SERPL-MCNC: 0.72 MG/DL (ref 0.57–1)
DEPRECATED RDW RBC AUTO: 41.3 FL (ref 37–54)
EGFRCR SERPLBLD CKD-EPI 2021: 105.9 ML/MIN/1.73
EOSINOPHIL # BLD AUTO: 0.03 10*3/MM3 (ref 0–0.4)
EOSINOPHIL NFR BLD AUTO: 0.5 % (ref 0.3–6.2)
ERYTHROCYTE [DISTWIDTH] IN BLOOD BY AUTOMATED COUNT: 12.8 % (ref 12.3–15.4)
GLOBULIN UR ELPH-MCNC: 3.3 GM/DL
GLUCOSE SERPL-MCNC: 75 MG/DL (ref 65–99)
HBA1C MFR BLD: 5.3 % (ref 4.8–5.6)
HCT VFR BLD AUTO: 43.3 % (ref 34–46.6)
HDLC SERPL-MCNC: 81 MG/DL (ref 40–60)
HGB BLD-MCNC: 14.1 G/DL (ref 12–15.9)
IMM GRANULOCYTES # BLD AUTO: 0.02 10*3/MM3 (ref 0–0.05)
IMM GRANULOCYTES NFR BLD AUTO: 0.3 % (ref 0–0.5)
LDLC SERPL CALC-MCNC: 142 MG/DL (ref 0–100)
LDLC/HDLC SERPL: 1.72 {RATIO}
LYMPHOCYTES # BLD AUTO: 1.84 10*3/MM3 (ref 0.7–3.1)
LYMPHOCYTES NFR BLD AUTO: 29 % (ref 19.6–45.3)
MCH RBC QN AUTO: 28.7 PG (ref 26.6–33)
MCHC RBC AUTO-ENTMCNC: 32.6 G/DL (ref 31.5–35.7)
MCV RBC AUTO: 88.2 FL (ref 79–97)
MONOCYTES # BLD AUTO: 0.57 10*3/MM3 (ref 0.1–0.9)
MONOCYTES NFR BLD AUTO: 9 % (ref 5–12)
NEUTROPHILS NFR BLD AUTO: 3.84 10*3/MM3 (ref 1.7–7)
NEUTROPHILS NFR BLD AUTO: 60.4 % (ref 42.7–76)
NRBC BLD AUTO-RTO: 0 /100 WBC (ref 0–0.2)
PLATELET # BLD AUTO: 372 10*3/MM3 (ref 140–450)
PMV BLD AUTO: 8.6 FL (ref 6–12)
POTASSIUM SERPL-SCNC: 4.2 MMOL/L (ref 3.5–5.2)
PROT SERPL-MCNC: 7.8 G/DL (ref 6–8.5)
RBC # BLD AUTO: 4.91 10*6/MM3 (ref 3.77–5.28)
SODIUM SERPL-SCNC: 138 MMOL/L (ref 136–145)
TRIGL SERPL-MCNC: 84 MG/DL (ref 0–150)
VLDLC SERPL-MCNC: 14 MG/DL (ref 5–40)
WBC NRBC COR # BLD: 6.35 10*3/MM3 (ref 3.4–10.8)

## 2023-08-28 PROCEDURE — 80053 COMPREHEN METABOLIC PANEL: CPT

## 2023-08-28 PROCEDURE — 85025 COMPLETE CBC W/AUTO DIFF WBC: CPT

## 2023-08-28 PROCEDURE — 83036 HEMOGLOBIN GLYCOSYLATED A1C: CPT

## 2023-08-28 PROCEDURE — 36415 COLL VENOUS BLD VENIPUNCTURE: CPT

## 2023-08-28 PROCEDURE — 80061 LIPID PANEL: CPT

## 2023-09-19 ENCOUNTER — TELEPHONE (OUTPATIENT)
Dept: INTERNAL MEDICINE | Facility: CLINIC | Age: 43
End: 2023-09-19

## 2023-09-19 NOTE — TELEPHONE ENCOUNTER
Caller: Danelle Estrada    Relationship: Self    Best call back number: [phone]     What form or medical record are you requesting: NOTE STATING THAT PATIENT IS MENTALLY UNABLE TO WORK     Who is requesting this form or medical record from you: STATE     How would you like to receive the form or medical records (pick-up, mail, fax): PICK-UP     Timeframe paperwork needed: ASAP    Additional notes:     PLEASE CALL WHEN READY FOR PICK-UP.

## 2023-11-06 ENCOUNTER — OFFICE VISIT (OUTPATIENT)
Dept: INTERNAL MEDICINE | Facility: CLINIC | Age: 43
End: 2023-11-06
Payer: MEDICARE

## 2023-11-06 VITALS
HEIGHT: 62 IN | DIASTOLIC BLOOD PRESSURE: 90 MMHG | TEMPERATURE: 97.7 F | HEART RATE: 95 BPM | OXYGEN SATURATION: 99 % | SYSTOLIC BLOOD PRESSURE: 130 MMHG | BODY MASS INDEX: 29.08 KG/M2 | WEIGHT: 158 LBS

## 2023-11-06 DIAGNOSIS — I10 ESSENTIAL HYPERTENSION: Primary | ICD-10-CM

## 2023-11-06 DIAGNOSIS — I10 ESSENTIAL HYPERTENSION: ICD-10-CM

## 2023-11-06 DIAGNOSIS — Z00.00 HEALTHCARE MAINTENANCE: ICD-10-CM

## 2023-11-06 DIAGNOSIS — E78.2 MODERATE MIXED HYPERLIPIDEMIA NOT REQUIRING STATIN THERAPY: ICD-10-CM

## 2023-11-06 RX ORDER — LISINOPRIL 10 MG/1
10 TABLET ORAL DAILY
Qty: 90 TABLET | Refills: 1 | Status: SHIPPED | OUTPATIENT
Start: 2023-11-06

## 2023-11-06 NOTE — PROGRESS NOTES
Chief Complaint   Patient presents with    Follow-up    Hypertension         History:  Danelle Estrada is a 43 y.o. female who presents today for evaluation of the above problems.      6 month follow-up.  She recently went to urgent care for diarrhea.  She was instructed to start taking probiotics.  Her symptoms have since resolved.    She is due for colorectal cancer screening.  She had her colonoscopy set up for September 22, 2023, but had to cancel it.  She is now planning on getting her colonoscopy early next year.    Difficult time cutting her lisinopril 20 mg tablets in half.    Needs refill on her diltiazem.    HPI      ROS:  Review of Systems  As above    Current Outpatient Medications:     acetaminophen (TYLENOL) 500 MG tablet, Take 1 tablet by mouth Every 6 (Six) Hours As Needed for Mild Pain., Disp: , Rfl:     acyclovir (Zovirax) 400 MG tablet, Take 2 tablets by mouth 3 (Three) Times a Day. Take no more than 5 doses a day., Disp: 60 tablet, Rfl: 5    benztropine (COGENTIN) 0.5 MG tablet, , Disp: , Rfl:     diphenhydrAMINE (BENADRYL) 25 mg capsule, Take 1 capsule by mouth Every 6 (Six) Hours As Needed for Itching., Disp: , Rfl:     EPINEPHrine (EPIPEN) 0.3 MG/0.3ML solution auto-injector injection, Inject 0.3 mL into the appropriate muscle as directed by prescriber As Needed (anaphylaxis)., Disp: 1 each, Rfl: 0    fluticasone (FLONASE) 50 MCG/ACT nasal spray, 2 sprays into the nostril(s) as directed by provider Daily., Disp: 16 g, Rfl: 0    lisinopril (PRINIVIL,ZESTRIL) 10 MG tablet, Take 1 tablet by mouth Daily., Disp: 90 tablet, Rfl: 1    Multiple Vitamins-Minerals (MULTIVITAMIN ADULTS PO), Take 1 tablet by mouth Daily., Disp: , Rfl:     norgestimate-ethinyl estradiol (ORTHO-CYCLEN) 0.25-35 MG-MCG per tablet, Take 1 tablet by mouth Daily. 1 po daily, Disp: 84 tablet, Rfl: 3    QUEtiapine (SEROquel) 200 MG tablet, , Disp: , Rfl:     verapamil SR (CALAN-SR) 180 MG CR tablet, Take 1 tablet by mouth Daily.,  Disp: 90 tablet, Rfl: 3    Lab Results   Component Value Date    GLUCOSE 75 08/28/2023    BUN 10 08/28/2023    CREATININE 0.72 08/28/2023    EGFR 105.9 08/28/2023    BCR 13.9 08/28/2023    K 4.2 08/28/2023    CO2 20.0 (L) 08/28/2023    CALCIUM 9.5 08/28/2023    ALBUMIN 4.5 08/28/2023    BILITOT 0.4 08/28/2023    AST 24 08/28/2023    ALT 15 08/28/2023       WBC   Date Value Ref Range Status   08/28/2023 6.35 3.40 - 10.80 10*3/mm3 Final     RBC   Date Value Ref Range Status   08/28/2023 4.91 3.77 - 5.28 10*6/mm3 Final     Hemoglobin   Date Value Ref Range Status   08/28/2023 14.1 12.0 - 15.9 g/dL Final     Hematocrit   Date Value Ref Range Status   08/28/2023 43.3 34.0 - 46.6 % Final     MCV   Date Value Ref Range Status   08/28/2023 88.2 79.0 - 97.0 fL Final     MCH   Date Value Ref Range Status   08/28/2023 28.7 26.6 - 33.0 pg Final     MCHC   Date Value Ref Range Status   08/28/2023 32.6 31.5 - 35.7 g/dL Final     RDW   Date Value Ref Range Status   08/28/2023 12.8 12.3 - 15.4 % Final     RDW-SD   Date Value Ref Range Status   08/28/2023 41.3 37.0 - 54.0 fl Final     MPV   Date Value Ref Range Status   08/28/2023 8.6 6.0 - 12.0 fL Final     Platelets   Date Value Ref Range Status   08/28/2023 372 140 - 450 10*3/mm3 Final     Neutrophil %   Date Value Ref Range Status   08/28/2023 60.4 42.7 - 76.0 % Final     Lymphocyte %   Date Value Ref Range Status   08/28/2023 29.0 19.6 - 45.3 % Final     Monocyte %   Date Value Ref Range Status   08/28/2023 9.0 5.0 - 12.0 % Final     Eosinophil %   Date Value Ref Range Status   08/28/2023 0.5 0.3 - 6.2 % Final     Basophil %   Date Value Ref Range Status   08/28/2023 0.8 0.0 - 1.5 % Final     Immature Grans %   Date Value Ref Range Status   08/28/2023 0.3 0.0 - 0.5 % Final     Neutrophils, Absolute   Date Value Ref Range Status   08/28/2023 3.84 1.70 - 7.00 10*3/mm3 Final     Lymphocytes, Absolute   Date Value Ref Range Status   08/28/2023 1.84 0.70 - 3.10 10*3/mm3 Final  "    Monocytes, Absolute   Date Value Ref Range Status   08/28/2023 0.57 0.10 - 0.90 10*3/mm3 Final     Eosinophils, Absolute   Date Value Ref Range Status   08/28/2023 0.03 0.00 - 0.40 10*3/mm3 Final     Basophils, Absolute   Date Value Ref Range Status   08/28/2023 0.05 0.00 - 0.20 10*3/mm3 Final     Immature Grans, Absolute   Date Value Ref Range Status   08/28/2023 0.02 0.00 - 0.05 10*3/mm3 Final     nRBC   Date Value Ref Range Status   08/28/2023 0.0 0.0 - 0.2 /100 WBC Final         OBJECTIVE:  Visit Vitals  /90 (BP Location: Left arm, Patient Position: Sitting, Cuff Size: Adult)   Pulse 95   Temp 97.7 °F (36.5 °C) (Temporal)   Ht 157.5 cm (62\")   Wt 71.7 kg (158 lb)   LMP 10/18/2023 (Exact Date)   SpO2 99%   BMI 28.90 kg/m²      Physical Exam  Vitals and nursing note reviewed.   Constitutional:       General: She is not in acute distress.     Appearance: Normal appearance. She is well-developed. She is not diaphoretic.      Comments: Pleasant   HENT:      Head: Normocephalic and atraumatic.   Eyes:      Pupils: Pupils are equal, round, and reactive to light.   Neck:      Thyroid: No thyromegaly.      Trachea: Phonation normal.   Pulmonary:      Effort: No respiratory distress.   Skin:     Coloration: Skin is not pale.      Findings: No erythema.   Neurological:      Mental Status: She is alert.   Psychiatric:         Behavior: Behavior normal.         Thought Content: Thought content normal.         Judgment: Judgment normal.         Assessment/Plan    Diagnoses and all orders for this visit:    1. Essential hypertension (Primary)  -     verapamil SR (CALAN-SR) 180 MG CR tablet; Take 1 tablet by mouth Daily.  Dispense: 90 tablet; Refill: 3  -     lisinopril (PRINIVIL,ZESTRIL) 10 MG tablet; Take 1 tablet by mouth Daily.  Dispense: 90 tablet; Refill: 1  -     Comprehensive Metabolic Panel; Future    2. Moderate mixed hyperlipidemia not requiring statin therapy  -     Lipid Panel; Future    3. Healthcare " maintenance  -     Comprehensive Metabolic Panel; Future  -     Hemoglobin A1c; Future  -     Lipid Panel; Future  -     CBC (No Diff); Future      Refill verapamil.  Change lisinopril tablet 10 mg.    Cholesterol slightly increased and she does not need any medication at this time.    She has had influenza vaccine COVID-19 vaccine earlier this season.    I recommend she reschedule her colonoscopy with Dr. Foster.    She is not needing labs today, but I have ordered labs to be done a few days before her next visit in 6 months.  This will be her Medicare wellness visit.  She will sooner if indicated.      Return in about 6 months (around 5/6/2024) for Medicare Wellness.      YVETTE Kessler MD  10:41 CST  11/6/2023   Electronically signed

## 2023-12-13 SDOH — HEALTH STABILITY: PHYSICAL HEALTH: ON AVERAGE, HOW MANY MINUTES DO YOU ENGAGE IN EXERCISE AT THIS LEVEL?: 0 MIN

## 2023-12-13 SDOH — HEALTH STABILITY: PHYSICAL HEALTH: ON AVERAGE, HOW MANY DAYS PER WEEK DO YOU ENGAGE IN MODERATE TO STRENUOUS EXERCISE (LIKE A BRISK WALK)?: 0 DAYS

## 2023-12-15 ENCOUNTER — OFFICE VISIT (OUTPATIENT)
Dept: INTERNAL MEDICINE | Age: 43
End: 2023-12-15
Payer: MEDICARE

## 2023-12-15 VITALS
HEART RATE: 118 BPM | WEIGHT: 155 LBS | DIASTOLIC BLOOD PRESSURE: 88 MMHG | HEIGHT: 62 IN | OXYGEN SATURATION: 97 % | BODY MASS INDEX: 28.52 KG/M2 | TEMPERATURE: 97.3 F | SYSTOLIC BLOOD PRESSURE: 140 MMHG

## 2023-12-15 DIAGNOSIS — Z00.00 ENCOUNTER FOR MEDICAL EXAMINATION TO ESTABLISH CARE: Primary | ICD-10-CM

## 2023-12-15 DIAGNOSIS — Z13.29 SCREENING FOR THYROID DISORDER: ICD-10-CM

## 2023-12-15 DIAGNOSIS — Z13.1 SCREENING FOR DIABETES MELLITUS: ICD-10-CM

## 2023-12-15 DIAGNOSIS — E78.5 HYPERLIPIDEMIA, UNSPECIFIED HYPERLIPIDEMIA TYPE: ICD-10-CM

## 2023-12-15 DIAGNOSIS — I10 HYPERTENSION, UNSPECIFIED TYPE: ICD-10-CM

## 2023-12-15 PROCEDURE — 3079F DIAST BP 80-89 MM HG: CPT | Performed by: NURSE PRACTITIONER

## 2023-12-15 PROCEDURE — G8484 FLU IMMUNIZE NO ADMIN: HCPCS | Performed by: NURSE PRACTITIONER

## 2023-12-15 PROCEDURE — 3077F SYST BP >= 140 MM HG: CPT | Performed by: NURSE PRACTITIONER

## 2023-12-15 PROCEDURE — 99203 OFFICE O/P NEW LOW 30 MIN: CPT | Performed by: NURSE PRACTITIONER

## 2023-12-15 PROCEDURE — G8419 CALC BMI OUT NRM PARAM NOF/U: HCPCS | Performed by: NURSE PRACTITIONER

## 2023-12-15 PROCEDURE — 1036F TOBACCO NON-USER: CPT | Performed by: NURSE PRACTITIONER

## 2023-12-15 PROCEDURE — G8427 DOCREV CUR MEDS BY ELIG CLIN: HCPCS | Performed by: NURSE PRACTITIONER

## 2023-12-15 RX ORDER — EPINEPHRINE 0.3 MG/.3ML
0.3 INJECTION SUBCUTANEOUS PRN
COMMUNITY
Start: 2023-05-02

## 2023-12-15 RX ORDER — QUETIAPINE FUMARATE 200 MG/1
200 TABLET, FILM COATED ORAL NIGHTLY
COMMUNITY
Start: 2023-06-01

## 2023-12-15 RX ORDER — ACYCLOVIR 400 MG/1
800 TABLET ORAL 3 TIMES DAILY
COMMUNITY
Start: 2023-03-29

## 2023-12-15 RX ORDER — NORGESTIMATE AND ETHINYL ESTRADIOL 0.25-0.035
1 KIT ORAL DAILY
COMMUNITY
Start: 2023-07-26

## 2023-12-15 RX ORDER — ACETAMINOPHEN 500 MG
500 TABLET ORAL EVERY 6 HOURS PRN
COMMUNITY

## 2023-12-15 RX ORDER — LISINOPRIL 10 MG/1
10 TABLET ORAL DAILY
COMMUNITY
Start: 2023-11-06

## 2023-12-15 RX ORDER — DIPHENHYDRAMINE HCL 25 MG
25 CAPSULE ORAL EVERY 6 HOURS PRN
COMMUNITY

## 2023-12-15 RX ORDER — BENZTROPINE MESYLATE 0.5 MG/1
0.5 TABLET ORAL 2 TIMES DAILY
COMMUNITY
Start: 2023-10-02

## 2023-12-15 SDOH — ECONOMIC STABILITY: INCOME INSECURITY: HOW HARD IS IT FOR YOU TO PAY FOR THE VERY BASICS LIKE FOOD, HOUSING, MEDICAL CARE, AND HEATING?: NOT HARD AT ALL

## 2023-12-15 SDOH — ECONOMIC STABILITY: HOUSING INSECURITY
IN THE LAST 12 MONTHS, WAS THERE A TIME WHEN YOU DID NOT HAVE A STEADY PLACE TO SLEEP OR SLEPT IN A SHELTER (INCLUDING NOW)?: NO

## 2023-12-15 SDOH — ECONOMIC STABILITY: FOOD INSECURITY: WITHIN THE PAST 12 MONTHS, THE FOOD YOU BOUGHT JUST DIDN'T LAST AND YOU DIDN'T HAVE MONEY TO GET MORE.: NEVER TRUE

## 2023-12-15 SDOH — ECONOMIC STABILITY: FOOD INSECURITY: WITHIN THE PAST 12 MONTHS, YOU WORRIED THAT YOUR FOOD WOULD RUN OUT BEFORE YOU GOT MONEY TO BUY MORE.: NEVER TRUE

## 2023-12-15 ASSESSMENT — PATIENT HEALTH QUESTIONNAIRE - PHQ9
SUM OF ALL RESPONSES TO PHQ QUESTIONS 1-9: 0
2. FEELING DOWN, DEPRESSED OR HOPELESS: 0
SUM OF ALL RESPONSES TO PHQ QUESTIONS 1-9: 0
1. LITTLE INTEREST OR PLEASURE IN DOING THINGS: 0
SUM OF ALL RESPONSES TO PHQ9 QUESTIONS 1 & 2: 0

## 2023-12-15 NOTE — PROGRESS NOTES
[facility] INTERNAL MEDICINE  [address]  [place] [address]  Dept: [phone]  Dept Fax: [phone]  Loc: [phone]    Tera Mccarthy is a 37 y.o. female who presents today for her medical conditions/complaintsas noted below. Tera Mccarthy is c/o of New Patient        HPI:     HPI  Maria Isabel Minors presents today to establish care. Transferring care from Dr. Norman Alexander due to insurance. Has history of high cholesterol, high blood pressure, and uterine fiborids. On medications. Has history of seasonal allergies and takes benadryl. Not fasting today. No concerns today  Past Medical History:   Diagnosis Date    High cholesterol     Hypertension      No past surgical history on file. Family History   Problem Relation Age of Onset    Breast Cancer Mother     Other Maternal Grandfather        Social History     Tobacco Use    Smoking status: Never    Smokeless tobacco: Never   Substance Use Topics    Alcohol use: Never      Current Outpatient Medications   Medication Sig Dispense Refill    EPINEPHrine (EPIPEN) 0.3 MG/0.3ML SOAJ injection Inject 0.3 mLs into the muscle as needed      verapamil (CALAN SR) 180 MG extended release tablet Take 1 tablet by mouth daily      QUEtiapine (SEROQUEL) 200 MG tablet Take 1 tablet by mouth at bedtime      benztropine (COGENTIN) 0.5 MG tablet Take 1 tablet by mouth in the morning and at bedtime      norgestimate-ethinyl estradiol (ORTHO-CYCLEN) 0.25-35 MG-MCG per tablet Take 1 tablet by mouth daily      acyclovir (ZOVIRAX) 400 MG tablet Take 2 tablets by mouth 3 times daily PRN      lisinopril (PRINIVIL;ZESTRIL) 10 MG tablet Take 1 tablet by mouth daily      diphenhydrAMINE (BENADRYL) 25 MG capsule Take 1 capsule by mouth every 6 hours as needed      acetaminophen (TYLENOL) 500 MG tablet Take 1 tablet by mouth every 6 hours as needed       No current facility-administered medications for this visit.      No Known

## 2024-01-11 DIAGNOSIS — Z13.29 SCREENING FOR THYROID DISORDER: ICD-10-CM

## 2024-01-11 DIAGNOSIS — E78.5 HYPERLIPIDEMIA, UNSPECIFIED HYPERLIPIDEMIA TYPE: ICD-10-CM

## 2024-01-11 DIAGNOSIS — I10 HYPERTENSION, UNSPECIFIED TYPE: ICD-10-CM

## 2024-01-11 LAB
ALBUMIN SERPL-MCNC: 3.8 G/DL (ref 3.5–5.2)
ALP SERPL-CCNC: 79 U/L (ref 35–104)
ALT SERPL-CCNC: 12 U/L (ref 5–33)
ANION GAP SERPL CALCULATED.3IONS-SCNC: 11 MMOL/L (ref 7–19)
AST SERPL-CCNC: 18 U/L (ref 5–32)
BASOPHILS # BLD: 0.1 K/UL (ref 0–0.2)
BASOPHILS NFR BLD: 0.9 % (ref 0–1)
BILIRUB SERPL-MCNC: 0.3 MG/DL (ref 0.2–1.2)
BUN SERPL-MCNC: 9 MG/DL (ref 6–20)
CALCIUM SERPL-MCNC: 9.1 MG/DL (ref 8.6–10)
CHLORIDE SERPL-SCNC: 105 MMOL/L (ref 98–111)
CHOLEST SERPL-MCNC: 228 MG/DL (ref 160–199)
CO2 SERPL-SCNC: 22 MMOL/L (ref 22–29)
CREAT SERPL-MCNC: 0.7 MG/DL (ref 0.5–0.9)
EOSINOPHIL # BLD: 0 K/UL (ref 0–0.6)
EOSINOPHIL NFR BLD: 0.3 % (ref 0–5)
ERYTHROCYTE [DISTWIDTH] IN BLOOD BY AUTOMATED COUNT: 12.3 % (ref 11.5–14.5)
GLUCOSE SERPL-MCNC: 82 MG/DL (ref 74–109)
HBA1C MFR BLD: 5.2 % (ref 4–6)
HCT VFR BLD AUTO: 42.7 % (ref 37–47)
HDLC SERPL-MCNC: 75 MG/DL (ref 65–121)
HGB BLD-MCNC: 14.3 G/DL (ref 12–16)
IMM GRANULOCYTES # BLD: 0 K/UL
LDLC SERPL CALC-MCNC: 139 MG/DL
LYMPHOCYTES # BLD: 1.8 K/UL (ref 1.1–4.5)
LYMPHOCYTES NFR BLD: 30.3 % (ref 20–40)
MCH RBC QN AUTO: 30 PG (ref 27–31)
MCHC RBC AUTO-ENTMCNC: 33.5 G/DL (ref 33–37)
MCV RBC AUTO: 89.5 FL (ref 81–99)
MONOCYTES # BLD: 0.5 K/UL (ref 0–0.9)
MONOCYTES NFR BLD: 8.7 % (ref 0–10)
NEUTROPHILS # BLD: 3.5 K/UL (ref 1.5–7.5)
NEUTS SEG NFR BLD: 59.5 % (ref 50–65)
PLATELET # BLD AUTO: 353 K/UL (ref 130–400)
PMV BLD AUTO: 9 FL (ref 9.4–12.3)
POTASSIUM SERPL-SCNC: 3.7 MMOL/L (ref 3.5–5)
PROT SERPL-MCNC: 7.4 G/DL (ref 6.6–8.7)
RBC # BLD AUTO: 4.77 M/UL (ref 4.2–5.4)
SODIUM SERPL-SCNC: 138 MMOL/L (ref 136–145)
TRIGL SERPL-MCNC: 68 MG/DL (ref 0–149)
TSH SERPL DL<=0.005 MIU/L-ACNC: 0.25 UIU/ML (ref 0.27–4.2)
WBC # BLD AUTO: 5.9 K/UL (ref 4.8–10.8)

## 2024-01-12 DIAGNOSIS — R79.89 LOW TSH LEVEL: Primary | ICD-10-CM

## 2024-01-12 DIAGNOSIS — E07.9 DISORDER OF THYROID, UNSPECIFIED: ICD-10-CM

## 2024-01-17 ASSESSMENT — PATIENT HEALTH QUESTIONNAIRE - PHQ9
SUM OF ALL RESPONSES TO PHQ QUESTIONS 1-9: 1
SUM OF ALL RESPONSES TO PHQ9 QUESTIONS 1 & 2: 1
1. LITTLE INTEREST OR PLEASURE IN DOING THINGS: SEVERAL DAYS
SUM OF ALL RESPONSES TO PHQ9 QUESTIONS 1 & 2: 1
SUM OF ALL RESPONSES TO PHQ QUESTIONS 1-9: 1
SUM OF ALL RESPONSES TO PHQ QUESTIONS 1-9: 1
1. LITTLE INTEREST OR PLEASURE IN DOING THINGS: 1
2. FEELING DOWN, DEPRESSED OR HOPELESS: NOT AT ALL
SUM OF ALL RESPONSES TO PHQ QUESTIONS 1-9: 1
2. FEELING DOWN, DEPRESSED OR HOPELESS: 0

## 2024-01-18 DIAGNOSIS — R79.89 LOW TSH LEVEL: ICD-10-CM

## 2024-01-18 DIAGNOSIS — E07.9 DISORDER OF THYROID, UNSPECIFIED: ICD-10-CM

## 2024-01-18 LAB
T3FREE SERPL-MCNC: 2.4 PG/ML (ref 2–4.4)
T4 FREE SERPL-MCNC: 0.84 NG/DL (ref 0.93–1.7)

## 2024-01-24 ENCOUNTER — HOSPITAL ENCOUNTER (OUTPATIENT)
Dept: GENERAL RADIOLOGY | Age: 44
Discharge: HOME | End: 2024-01-24
Payer: MEDICARE

## 2024-01-24 ENCOUNTER — OFFICE VISIT (OUTPATIENT)
Dept: INTERNAL MEDICINE | Age: 44
End: 2024-01-24
Payer: MEDICARE

## 2024-01-24 VITALS
HEART RATE: 89 BPM | DIASTOLIC BLOOD PRESSURE: 70 MMHG | SYSTOLIC BLOOD PRESSURE: 104 MMHG | BODY MASS INDEX: 28.17 KG/M2 | OXYGEN SATURATION: 98 % | WEIGHT: 154 LBS

## 2024-01-24 DIAGNOSIS — E07.9 DISORDER OF THYROID, UNSPECIFIED: ICD-10-CM

## 2024-01-24 DIAGNOSIS — R79.89 LOW TSH LEVEL: ICD-10-CM

## 2024-01-24 DIAGNOSIS — M79.671 PAIN OF RIGHT HEEL: ICD-10-CM

## 2024-01-24 DIAGNOSIS — Z09 FOLLOW-UP EXAM: Primary | ICD-10-CM

## 2024-01-24 DIAGNOSIS — Z12.11 ENCOUNTER FOR SCREENING COLONOSCOPY: ICD-10-CM

## 2024-01-24 DIAGNOSIS — E07.89 OTHER SPECIFIED DISORDERS OF THYROID: ICD-10-CM

## 2024-01-24 PROCEDURE — G8484 FLU IMMUNIZE NO ADMIN: HCPCS | Performed by: NURSE PRACTITIONER

## 2024-01-24 PROCEDURE — G8419 CALC BMI OUT NRM PARAM NOF/U: HCPCS | Performed by: NURSE PRACTITIONER

## 2024-01-24 PROCEDURE — G8427 DOCREV CUR MEDS BY ELIG CLIN: HCPCS | Performed by: NURSE PRACTITIONER

## 2024-01-24 PROCEDURE — 99213 OFFICE O/P EST LOW 20 MIN: CPT | Performed by: NURSE PRACTITIONER

## 2024-01-24 PROCEDURE — 73630 X-RAY EXAM OF FOOT: CPT

## 2024-01-24 PROCEDURE — 1036F TOBACCO NON-USER: CPT | Performed by: NURSE PRACTITIONER

## 2024-01-24 NOTE — PROGRESS NOTES
[facility]  [facility] INTERNAL MEDICINE  [facility] [address]  [address]  [place] [address]  Dept: [phone]  Dept Fax: [phone]  Loc: [phone]    Torito Salgado is a 43 y.o. female who presents today for her medical conditions/complaintsas noted below.  Torito Salgado is c/o of Follow-up        HPI:     SENTHIL Ugarte presents today for follow up. Her TSH was low and her Free T4 was low. Reports had a thyroid level in the past that was off once and never since. No family history of thyroid disease. Is not taking biotin but has in the past. Currently only taking a multivitamin and her routine daily medications. Is about to start taking a womens health probiotic.     Had mammogram at [facility] 12/18/23 and it was normal    Needs colonoscopy. Was scheduled last year but had to cancel due to scheduling conflicts and then the insurance with [facility].     Has appointment with GYN 2/1/24    Reports she has been having pain from her back down to her R foot. Stretches help, but still having constant pain that is worse with walking on the R heel. No known injury. Nothing is helping it.   Past Medical History:   Diagnosis Date    High cholesterol     Hypertension      No past surgical history on file.    Family History   Problem Relation Age of Onset    Breast Cancer Mother     Other Maternal Grandfather        Social History     Tobacco Use    Smoking status: Never    Smokeless tobacco: Never   Substance Use Topics    Alcohol use: Never      Current Outpatient Medications   Medication Sig Dispense Refill    EPINEPHrine (EPIPEN) 0.3 MG/0.3ML SOAJ injection Inject 0.3 mLs into the muscle as needed      verapamil (CALAN SR) 180 MG extended release tablet Take 1 tablet by mouth daily      QUEtiapine (SEROQUEL) 200 MG tablet Take 1 tablet by mouth at bedtime      benztropine (COGENTIN) 0.5 MG tablet Take 1 tablet by mouth in the morning and at bedtime      norgestimate-ethinyl estradiol

## 2024-01-26 DIAGNOSIS — M79.671 PAIN OF RIGHT HEEL: Primary | ICD-10-CM

## 2024-02-01 ENCOUNTER — OFFICE VISIT (OUTPATIENT)
Dept: OBGYN CLINIC | Age: 44
End: 2024-02-01
Payer: MEDICARE

## 2024-02-01 VITALS
WEIGHT: 154 LBS | HEART RATE: 74 BPM | DIASTOLIC BLOOD PRESSURE: 99 MMHG | SYSTOLIC BLOOD PRESSURE: 147 MMHG | HEIGHT: 62 IN | BODY MASS INDEX: 28.34 KG/M2

## 2024-02-01 DIAGNOSIS — Z11.51 SCREENING FOR HPV (HUMAN PAPILLOMAVIRUS): ICD-10-CM

## 2024-02-01 DIAGNOSIS — Z76.89 ENCOUNTER TO ESTABLISH CARE: Primary | ICD-10-CM

## 2024-02-01 DIAGNOSIS — Z12.31 SCREENING MAMMOGRAM FOR BREAST CANCER: ICD-10-CM

## 2024-02-01 DIAGNOSIS — Z12.11 COLON CANCER SCREENING: ICD-10-CM

## 2024-02-01 DIAGNOSIS — Z12.4 ENCOUNTER FOR SCREENING FOR CERVICAL CANCER: ICD-10-CM

## 2024-02-01 DIAGNOSIS — Z30.41 ENCOUNTER FOR SURVEILLANCE OF CONTRACEPTIVE PILLS: ICD-10-CM

## 2024-02-01 DIAGNOSIS — Z01.419 ENCOUNTER FOR ANNUAL ROUTINE GYNECOLOGICAL EXAMINATION: ICD-10-CM

## 2024-02-01 DIAGNOSIS — Z12.39 ENCOUNTER FOR SCREENING BREAST EXAMINATION: ICD-10-CM

## 2024-02-01 PROCEDURE — 99386 PREV VISIT NEW AGE 40-64: CPT

## 2024-02-01 PROCEDURE — G8484 FLU IMMUNIZE NO ADMIN: HCPCS

## 2024-02-01 NOTE — PROGRESS NOTES
[facility]  CNM Office Note    Torito Salgado is a 43 y.o. female who presents today for her medical conditions/ complaints as noted below.  No chief complaint on file.    Last mammogram:  23  Last pap smear:  23  Contraception:    :  0  Para:  0  AB:    Last bone density:  needs one if old enough  Last colonoscopy:   needs one    HPI  Torito Salgado presents today for annual exam. She is due for pap smear and mammogram. She reports history of abnormal pap smears and denies history of abnormal mammograms. She reports family history of breast cancer in paternal aunt, and maternal grandmother had ovarian cancer. She had testing for ovarian cancer markers and it was negative. She reports her periods are regular every 28-30 days. She reports her menses are not problematic on OCP. She has history of uterine fibroids. She denies issues with bladder, bowel. She is not currently sexually active. Her choice of contraception is: oral contraceptives (estrogen/progesterone). She feels her sleep pattern and quality is Good.      Problems/Complaints today:  1. Encounter to establish care  2. Encounter for annual routine gynecological examination  -     Human papillomavirus (HPV) DNA probe thin prep high risk  -     PAP SMEAR  3. Encounter for screening for cervical cancer  -     PAP SMEAR  4. Screening for HPV (human papillomavirus)  -     Human papillomavirus (HPV) DNA probe thin prep high risk  -     PAP SMEAR  5. Screening mammogram for breast cancer  -     SHRADDHA DIGITAL SCREEN W OR WO CAD BILATERAL; Future  6. Encounter for screening breast examination  7. Colon cancer screening  8. Encounter for surveillance of contraceptive pills  -     norgestimate-ethinyl estradiol (ORTHO-CYCLEN) 0.25-35 MG-MCG per tablet; Take 1 tablet by mouth daily, Disp-3 packet, R-3Normal       There is no problem list on file for this patient.      Patient's last menstrual period was 2024.      Past 
Pt presents today for pap smear and breast exam.  She also complains of     Last mammogram:  23  Last pap smear:  23  Contraception:    :  0  Para:  0  AB:    Last bone density:  needs one if old enough  Last colonoscopy:   needs one    Rash from medication from [facility]    Uterine full of fibroids  
no

## 2024-02-06 LAB
HPV HR 12 DNA SPEC QL NAA+PROBE: NOT DETECTED
HPV16 DNA SPEC QL NAA+PROBE: NOT DETECTED
HPV16+18+H RISK 12 DNA SPEC-IMP: NORMAL
HPV18 DNA SPEC QL NAA+PROBE: NOT DETECTED

## 2024-02-12 RX ORDER — NORGESTIMATE AND ETHINYL ESTRADIOL 0.25-0.035
1 KIT ORAL DAILY
Qty: 3 PACKET | Refills: 3 | Status: SHIPPED | OUTPATIENT
Start: 2024-02-12

## 2024-02-16 ENCOUNTER — TELEPHONE (OUTPATIENT)
Dept: OBGYN CLINIC | Age: 44
End: 2024-02-16

## 2024-02-16 DIAGNOSIS — Z12.11 COLON CANCER SCREENING: Primary | ICD-10-CM

## 2024-02-21 DIAGNOSIS — E07.89 OTHER SPECIFIED DISORDERS OF THYROID: ICD-10-CM

## 2024-02-21 DIAGNOSIS — R79.89 LOW TSH LEVEL: ICD-10-CM

## 2024-02-21 DIAGNOSIS — E07.9 DISORDER OF THYROID, UNSPECIFIED: ICD-10-CM

## 2024-02-21 LAB
T4 FREE SERPL-MCNC: 0.89 NG/DL (ref 0.93–1.7)
TSH SERPL DL<=0.005 MIU/L-ACNC: 0.29 UIU/ML (ref 0.27–4.2)

## 2024-03-04 ENCOUNTER — TELEPHONE (OUTPATIENT)
Dept: OBGYN CLINIC | Age: 44
End: 2024-03-04

## 2024-03-04 NOTE — TELEPHONE ENCOUNTER
Torito returned a missed call to the office to discuss OA. Patient advised that she is available anytime today for a call back.     Thank you.

## 2024-03-07 LAB — NONINV COLON CA DNA+OCC BLD SCRN STL QL: NEGATIVE

## 2024-03-29 ENCOUNTER — TELEPHONE (OUTPATIENT)
Dept: GASTROENTEROLOGY | Facility: CLINIC | Age: 44
End: 2024-03-29
Payer: MEDICARE

## 2024-03-29 NOTE — TELEPHONE ENCOUNTER
I have sent 2 letters to pt re: recall colon and have had no response. I called and spoke to her about it-she tells me that [facility] no longer takes her insurance so she has been going to [facility]. I am going to remove pt from my recall list.

## 2024-04-01 ENCOUNTER — TELEPHONE (OUTPATIENT)
Dept: GASTROENTEROLOGY | Age: 44
End: 2024-04-01

## 2024-04-01 NOTE — TELEPHONE ENCOUNTER
04- patient called stated that she has a colon on April 12th with Dr Pina.  IT has that she needs to stop Vitamin E & Iron prior to the procedure.  She is taking a Multi vitamin with them in it questions is she needs to stop it.         Called patient advised that if the Multi vitamin has those things in it to go ahead and stop it.     Oked per patient

## 2024-04-08 ENCOUNTER — TELEPHONE (OUTPATIENT)
Dept: GASTROENTEROLOGY | Age: 44
End: 2024-04-08

## 2024-04-11 ENCOUNTER — TELEPHONE (OUTPATIENT)
Dept: GASTROENTEROLOGY | Age: 44
End: 2024-04-11

## 2024-04-11 NOTE — TELEPHONE ENCOUNTER
Torito called to reschedule an appointment for  colonoscopy . Pt requesting a Friday. [facility] was unable to accommodate. Please be advised that the best time to call Anytime.    Thank you.

## 2024-04-11 NOTE — TELEPHONE ENCOUNTER
Torito called back to return call to reschedule her procedure that is on 4/19/24. She needs a Friday appointment. Please contact pt to advise.    Thank you.

## 2024-04-12 DIAGNOSIS — I10 HYPERTENSION, UNSPECIFIED TYPE: Primary | ICD-10-CM

## 2024-04-12 DIAGNOSIS — B00.9 HERPES: ICD-10-CM

## 2024-04-12 RX ORDER — LISINOPRIL 10 MG/1
10 TABLET ORAL DAILY
Qty: 90 TABLET | Refills: 1 | Status: SHIPPED | OUTPATIENT
Start: 2024-04-12

## 2024-04-12 RX ORDER — ACYCLOVIR 400 MG/1
400 TABLET ORAL 3 TIMES DAILY
Qty: 60 TABLET | Refills: 5 | OUTPATIENT
Start: 2024-04-12

## 2024-04-12 RX ORDER — ACYCLOVIR 400 MG/1
TABLET ORAL
Qty: 60 TABLET | Refills: 1 | Status: SHIPPED | OUTPATIENT
Start: 2024-04-12

## 2024-04-12 NOTE — TELEPHONE ENCOUNTER
Torito called requesting a refill of the below medication which has been pended for you:     Requested Prescriptions     Pending Prescriptions Disp Refills    lisinopril (PRINIVIL;ZESTRIL) 10 MG tablet [Pharmacy Med Name: Lisinopril 10MG TABS] 90 tablet 1     Sig: TAKE 1 TABLET BY MOUTH DAILY    acyclovir (ZOVIRAX) 400 MG tablet [Pharmacy Med Name: Acyclovir 400MG TABS] 60 tablet 1     Sig: TAKE 2 TABLETS BY MOUTH THREE TIMES A DAY NO MORE THAN 5 DOSES A DAY       Last Appointment Date: 1/24/2024  Next Appointment Date: Visit date not found

## 2024-04-12 NOTE — TELEPHONE ENCOUNTER
Rx Refill Note  Requested Prescriptions     Pending Prescriptions Disp Refills    acyclovir (ZOVIRAX) 400 MG tablet [Pharmacy Med Name: Acyclovir 400MG TABS] 60 tablet 5     Sig: TAKE 2 TABLETS BY MOUTH THREE TIMES A DAY NO MORE THAN 5 DOSES A DAY      Last office visit with prescribing clinician: 11/6/2023   Last telemedicine visit with prescribing clinician: Visit date not found   Next office visit with prescribing clinician: Visit date not found                         Would you like a call back once the refill request has been completed: [] Yes [] No    If the office needs to give you a call back, can they leave a voicemail: [] Yes [] No    Jeremy Harper MA  04/12/24, 10:44 CDT

## 2024-04-18 ENCOUNTER — TELEPHONE (OUTPATIENT)
Dept: GASTROENTEROLOGY | Age: 44
End: 2024-04-18

## 2024-04-18 NOTE — TELEPHONE ENCOUNTER
Patient called and needs cancel her CLN ON 4-24-24.  Patient will called back to r/s       Thank you

## 2024-04-23 ENCOUNTER — OFFICE VISIT (OUTPATIENT)
Dept: INTERNAL MEDICINE | Age: 44
End: 2024-04-23
Payer: MEDICARE

## 2024-04-23 VITALS
WEIGHT: 160 LBS | HEIGHT: 62 IN | BODY MASS INDEX: 29.44 KG/M2 | SYSTOLIC BLOOD PRESSURE: 124 MMHG | TEMPERATURE: 98.9 F | RESPIRATION RATE: 18 BRPM | HEART RATE: 97 BPM | DIASTOLIC BLOOD PRESSURE: 78 MMHG | OXYGEN SATURATION: 100 %

## 2024-04-23 DIAGNOSIS — E07.9 DISORDER OF THYROID, UNSPECIFIED: ICD-10-CM

## 2024-04-23 DIAGNOSIS — I10 HYPERTENSION, UNSPECIFIED TYPE: ICD-10-CM

## 2024-04-23 DIAGNOSIS — T78.40XA ALLERGIC REACTION, INITIAL ENCOUNTER: ICD-10-CM

## 2024-04-23 DIAGNOSIS — Z00.00 MEDICARE ANNUAL WELLNESS VISIT, SUBSEQUENT: Primary | ICD-10-CM

## 2024-04-23 DIAGNOSIS — E78.5 HYPERLIPIDEMIA, UNSPECIFIED HYPERLIPIDEMIA TYPE: ICD-10-CM

## 2024-04-23 LAB
T4 FREE SERPL-MCNC: 0.91 NG/DL (ref 0.93–1.7)
TSH SERPL DL<=0.005 MIU/L-ACNC: 0.31 UIU/ML (ref 0.27–4.2)

## 2024-04-23 PROCEDURE — 3078F DIAST BP <80 MM HG: CPT | Performed by: NURSE PRACTITIONER

## 2024-04-23 PROCEDURE — G0439 PPPS, SUBSEQ VISIT: HCPCS | Performed by: NURSE PRACTITIONER

## 2024-04-23 PROCEDURE — 3074F SYST BP LT 130 MM HG: CPT | Performed by: NURSE PRACTITIONER

## 2024-04-23 RX ORDER — EPINEPHRINE 0.3 MG/.3ML
0.3 INJECTION SUBCUTANEOUS PRN
Qty: 1 EACH | Refills: 1 | Status: SHIPPED | OUTPATIENT
Start: 2024-04-23

## 2024-04-23 RX ORDER — DEUTETRABENAZINE 6 MG/1
6 TABLET, COATED ORAL DAILY
COMMUNITY
Start: 2024-04-01

## 2024-04-23 ASSESSMENT — LIFESTYLE VARIABLES
HOW OFTEN DO YOU HAVE A DRINK CONTAINING ALCOHOL: NEVER
HOW MANY STANDARD DRINKS CONTAINING ALCOHOL DO YOU HAVE ON A TYPICAL DAY: PATIENT DOES NOT DRINK

## 2024-04-23 ASSESSMENT — PATIENT HEALTH QUESTIONNAIRE - PHQ9
SUM OF ALL RESPONSES TO PHQ QUESTIONS 1-9: 0
2. FEELING DOWN, DEPRESSED OR HOPELESS: NOT AT ALL
SUM OF ALL RESPONSES TO PHQ QUESTIONS 1-9: 0
SUM OF ALL RESPONSES TO PHQ9 QUESTIONS 1 & 2: 0
1. LITTLE INTEREST OR PLEASURE IN DOING THINGS: NOT AT ALL

## 2024-04-23 NOTE — PATIENT INSTRUCTIONS
every 1-2 years to screen for glaucoma; cataracts, macular degeneration, and other eye disorders.  A preventive dental visit is recommended every 6 months.  Try to get at least 150 minutes of exercise per week or 10,000 steps per day on a pedometer .  Order or download the FREE \"Exercise & Physical Activity: Your Everyday Guide\" from [employer]. Call 1-144.213.3862 or search [employer] online.  You need 8456-4355 mg of calcium and 8330-8250 IU of vitamin D per day. It is possible to meet your calcium requirement with diet alone, but a vitamin D supplement is usually necessary to meet this goal.  When exposed to the sun, use a sunscreen that protects against both UVA and UVB radiation with an SPF of 30 or greater. Reapply every 2 to 3 hours or after sweating, drying off with a towel, or swimming.  Always wear a seat belt when traveling in a car. Always wear a helmet when riding a bicycle or motorcycle.

## 2024-04-23 NOTE — PROGRESS NOTES
Medicare Annual Wellness Visit    Torito Salgado is here for Other and Medicare AWV    HPI:  Torito presents today for Medicare annual wellness visit.  She reports she is doing well.  She needs refill on her epinephrine pen.  She also reports that her right ear is bothering her.  Otherwise is doing well.        Assessment & Plan   Medicare annual wellness visit, subsequent  Allergic reaction, initial encounter  -     EPINEPHrine (EPIPEN) 0.3 MG/0.3ML SOAJ injection; Inject 0.3 mLs into the muscle as needed (allergic reaction), Disp-1 each, R-1Normal  Hypertension, unspecified type  Hyperlipidemia, unspecified hyperlipidemia type      Allergic reaction: refill of epi pen  Hypertension: stable on verapamil and lisinopril. Continue current management.   Hyperlipidemia: stable. Continue lifestyle modifications.       Recommendations for Preventive Services Due: see orders and patient instructions/AVS.  Recommended screening schedule for the next 5-10 years is provided to the patient in written form: see Patient Instructions/AVS.     Return in 1 year (on 4/23/2025).     Subjective   Review of Systems   HENT:  Positive for ear pain.    All other systems reviewed and are negative.        Patient's complete Health Risk Assessment and screening values have been reviewed and are found in Flowsheets. The following problems were reviewed today and where indicated follow up appointments were made and/or referrals ordered.    No Positive Risk Factors identified today.                                Objective   Vitals:    04/23/24 0955   BP: 124/78   Pulse: 97   Resp: 18   Temp: 98.9 °F (37.2 °C)   SpO2: 100%   Weight: 72.6 kg (160 lb)   Height: 1.575 m (5' 2\")      Body mass index is 29.26 kg/m².        Physical Exam  Vitals and nursing note reviewed.   Constitutional:       General: She is not in acute distress.     Appearance: Normal appearance. She is well-developed. She is not ill-appearing or diaphoretic.   HENT:

## 2024-04-29 LAB
ALBUMIN SERPL-MCNC: 3.8 G/DL (ref 3.5–5.2)
ALP SERPL-CCNC: 63 U/L (ref 35–104)
ALT SERPL-CCNC: 12 U/L (ref 5–33)
ANION GAP SERPL CALCULATED.3IONS-SCNC: 11 MMOL/L (ref 7–19)
AST SERPL-CCNC: 20 U/L (ref 5–32)
BASOPHILS # BLD: 0 K/UL (ref 0–0.2)
BASOPHILS NFR BLD: 0.6 % (ref 0–1)
BILIRUB SERPL-MCNC: 0.3 MG/DL (ref 0.2–1.2)
BUN SERPL-MCNC: 9 MG/DL (ref 6–20)
CALCIUM SERPL-MCNC: 8.9 MG/DL (ref 8.6–10)
CHLORIDE SERPL-SCNC: 103 MMOL/L (ref 98–111)
CHOLEST SERPL-MCNC: 203 MG/DL (ref 160–199)
CO2 SERPL-SCNC: 23 MMOL/L (ref 22–29)
CREAT SERPL-MCNC: 0.7 MG/DL (ref 0.5–0.9)
EOSINOPHIL # BLD: 0 K/UL (ref 0–0.6)
EOSINOPHIL NFR BLD: 0.6 % (ref 0–5)
ERYTHROCYTE [DISTWIDTH] IN BLOOD BY AUTOMATED COUNT: 12.6 % (ref 11.5–14.5)
GLUCOSE SERPL-MCNC: 79 MG/DL (ref 74–109)
HBA1C MFR BLD: 5.3 % (ref 4–6)
HCT VFR BLD AUTO: 39 % (ref 37–47)
HDLC SERPL-MCNC: 73 MG/DL (ref 65–121)
HGB BLD-MCNC: 13.3 G/DL (ref 12–16)
IMM GRANULOCYTES # BLD: 0 K/UL
LDLC SERPL CALC-MCNC: 114 MG/DL
LYMPHOCYTES # BLD: 1.8 K/UL (ref 1.1–4.5)
LYMPHOCYTES NFR BLD: 33.3 % (ref 20–40)
MCH RBC QN AUTO: 30.5 PG (ref 27–31)
MCHC RBC AUTO-ENTMCNC: 34.1 G/DL (ref 33–37)
MCV RBC AUTO: 89.4 FL (ref 81–99)
MONOCYTES # BLD: 0.6 K/UL (ref 0–0.9)
MONOCYTES NFR BLD: 12 % (ref 0–10)
NEUTROPHILS # BLD: 2.8 K/UL (ref 1.5–7.5)
NEUTS SEG NFR BLD: 53.1 % (ref 50–65)
PLATELET # BLD AUTO: 340 K/UL (ref 130–400)
PMV BLD AUTO: 9.3 FL (ref 9.4–12.3)
POTASSIUM SERPL-SCNC: 3.9 MMOL/L (ref 3.5–5)
PROT SERPL-MCNC: 7.2 G/DL (ref 6.6–8.7)
RBC # BLD AUTO: 4.36 M/UL (ref 4.2–5.4)
SODIUM SERPL-SCNC: 137 MMOL/L (ref 136–145)
TRIGL SERPL-MCNC: 81 MG/DL (ref 0–149)
WBC # BLD AUTO: 5.3 K/UL (ref 4.8–10.8)

## 2024-05-06 ENCOUNTER — TELEPHONE (OUTPATIENT)
Dept: GASTROENTEROLOGY | Age: 44
End: 2024-05-06

## 2024-05-06 NOTE — TELEPHONE ENCOUNTER
Torito called to reschedule an appointment for  colonoscopy . [facility] was unable to accommodate in the time frame needed. Please be advised that the best time to call Anytime.    Thank you.

## 2024-05-22 ENCOUNTER — ANESTHESIA EVENT (OUTPATIENT)
Dept: OPERATING ROOM | Age: 44
End: 2024-05-22

## 2024-05-24 ENCOUNTER — APPOINTMENT (OUTPATIENT)
Dept: OPERATING ROOM | Age: 44
End: 2024-05-24
Attending: INTERNAL MEDICINE

## 2024-05-24 ENCOUNTER — ANESTHESIA (OUTPATIENT)
Dept: OPERATING ROOM | Age: 44
End: 2024-05-24

## 2024-05-24 ENCOUNTER — HOSPITAL ENCOUNTER (OUTPATIENT)
Age: 44
Setting detail: OUTPATIENT SURGERY
Discharge: HOME | End: 2024-05-24
Attending: INTERNAL MEDICINE | Admitting: INTERNAL MEDICINE

## 2024-05-24 VITALS
DIASTOLIC BLOOD PRESSURE: 90 MMHG | HEIGHT: 62 IN | TEMPERATURE: 98.4 F | WEIGHT: 160 LBS | RESPIRATION RATE: 18 BRPM | BODY MASS INDEX: 29.44 KG/M2 | SYSTOLIC BLOOD PRESSURE: 131 MMHG | HEART RATE: 65 BPM | OXYGEN SATURATION: 100 %

## 2024-05-24 PROCEDURE — G0121 COLON CA SCRN NOT HI RSK IND: HCPCS

## 2024-05-24 RX ORDER — DEXMEDETOMIDINE HYDROCHLORIDE 100 UG/ML
INJECTION, SOLUTION INTRAVENOUS PRN
Status: DISCONTINUED | OUTPATIENT
Start: 2024-05-24 | End: 2024-05-24 | Stop reason: SDUPTHER

## 2024-05-24 RX ORDER — LIDOCAINE HYDROCHLORIDE 10 MG/ML
INJECTION, SOLUTION EPIDURAL; INFILTRATION; INTRACAUDAL; PERINEURAL PRN
Status: DISCONTINUED | OUTPATIENT
Start: 2024-05-24 | End: 2024-05-24 | Stop reason: SDUPTHER

## 2024-05-24 RX ORDER — SODIUM CHLORIDE, SODIUM LACTATE, POTASSIUM CHLORIDE, CALCIUM CHLORIDE 600; 310; 30; 20 MG/100ML; MG/100ML; MG/100ML; MG/100ML
INJECTION, SOLUTION INTRAVENOUS CONTINUOUS
Status: DISCONTINUED | OUTPATIENT
Start: 2024-05-24 | End: 2024-05-24 | Stop reason: HOSPADM

## 2024-05-24 RX ORDER — PROPOFOL 10 MG/ML
INJECTION, EMULSION INTRAVENOUS PRN
Status: DISCONTINUED | OUTPATIENT
Start: 2024-05-24 | End: 2024-05-24 | Stop reason: SDUPTHER

## 2024-05-24 RX ADMIN — SODIUM CHLORIDE, SODIUM LACTATE, POTASSIUM CHLORIDE, CALCIUM CHLORIDE: 600; 310; 30; 20 INJECTION, SOLUTION INTRAVENOUS at 09:36

## 2024-05-24 RX ADMIN — PROPOFOL 250 MG: 10 INJECTION, EMULSION INTRAVENOUS at 10:16

## 2024-05-24 RX ADMIN — DEXMEDETOMIDINE HYDROCHLORIDE 10 MCG: 100 INJECTION, SOLUTION INTRAVENOUS at 10:08

## 2024-05-24 RX ADMIN — PROPOFOL 50 MG: 10 INJECTION, EMULSION INTRAVENOUS at 10:08

## 2024-05-24 RX ADMIN — LIDOCAINE HYDROCHLORIDE 50 MG: 10 INJECTION, SOLUTION EPIDURAL; INFILTRATION; INTRACAUDAL; PERINEURAL at 10:08

## 2024-05-24 ASSESSMENT — PAIN - FUNCTIONAL ASSESSMENT
PAIN_FUNCTIONAL_ASSESSMENT: NONE - DENIES PAIN

## 2024-05-24 NOTE — ANESTHESIA POSTPROCEDURE EVALUATION
Department of Anesthesiology  Postprocedure Note    Patient: Torito Salgado  MRN: [id number]  YOB: 1980  Date of evaluation: 5/24/2024    Procedure Summary       Date: 05/24/24 Room / Location: [address] [facility] [place]    Anesthesia Start: 1003 Anesthesia Stop: 1038    Procedure: COLORECTAL CANCER SCREENING, NOT HIGH RISK (Abdomen) Diagnosis:       Screen for colon cancer      (Screen for colon cancer [Z12.11])    Surgeons: Kevin Pina MD Responsible Provider: Jaclyn Montgomery APRN - CRNA    Anesthesia Type: general, TIVA ASA Status: 2            Anesthesia Type: No value filed.    Melanie Phase I:      Melanie Phase II:      Anesthesia Post Evaluation    Patient location during evaluation: bedside  Patient participation: complete - patient participated  Level of consciousness: sleepy but conscious  Pain score: 0  Airway patency: patent  Nausea & Vomiting: no nausea and no vomiting  Cardiovascular status: blood pressure returned to baseline  Respiratory status: acceptable, room air and spontaneous ventilation  Hydration status: euvolemic  Pain management: adequate    No notable events documented.

## 2024-05-24 NOTE — OP NOTE
Patient: Torito Salgado : 1980  Med Rec#: [id number] Acc#: [id number]   Primary Care Provider Bess Ty APRN    Date of Procedure:  2024    Endoscopist: Kevin Pina MD, MD    Referring Provider: Bess Ty APRN,     Operation Performed: Colonoscopy up to the cecum    Indications: Colon cancer screening    Anesthesia:  Sedation was administered by anesthesia who monitored the patient during the procedure.    I met with Torito Salgado prior to procedure. We discussed the procedure itself, and I have discussed the risks of endoscopy (including-- but not limited to-- pain, discomfort, bleeding potentially requiring second endoscopic procedure and/or blood transfusion, organ perforation requiring operative repair, damage to organs near the colon, infection, aspiration, cardiopulmonary/allergic reaction), benefits, indications to endoscopy. Additionally, we discussed options other than colonoscopy. The patient expressed understanding. All questions answered. The patient decided to proceed with the procedure.  Signed informed consent was placed on the chart.    Blood Loss: minimal    Withdrawal time: More than 9 minutes  Bowel Prep: adequate     Complications: no immediate complications    DESCRIPTION OF PROCEDURE:     A time out was performed. After written informed consent was obtained, the patient was placed in the left lateral position.     The perianal area was inspected, and a digital rectal exam was performed. A rectal exam was performed: normal tone, no palpable lesions. At this point, a forward viewing Olympus colonoscope was inserted into the anus and carefully advanced to the cecum.  The cecum was identified by the ileocecal valve and the appendiceal orifice. The colonoscope was then slowly withdrawn with careful inspection of the mucosa in a linear and circumferential fashion. The scope was retroflexed in the rectum. Suction was utilized during the procedure to

## 2024-05-24 NOTE — H&P
Patient Name: Torito Salgado  : 1980  MRN: [id number]  DATE: 24    Allergies: No Known Allergies     ENDOSCOPY  History and Physical    Procedure:    [] Diagnostic Colonoscopy       [x] Screening Colonoscopy  [] EGD      [] ERCP      [] EUS       [] Other    [x] Previous office notes/History and Physical reviewed from the patients chart. Please see EMR for further details of HPI. I have examined the patient's status immediately prior to the procedure and:      Indications/HPI:    []Abdominal Pain   []Cancer- GI/Lung     []Fhx of colon CA/polyps  []History of Polyps  []Barretts            []Melena  []Abnormal Imaging              []Dysphagia              []Persistent Pneumonia   []Anemia                            []Food Impaction        []History of Polyps  [] GI Bleed             []Pulmonary nodule/Mass   []Change in bowel habits []Heartburn/Reflux  []Rectal Bleed (BRBPR)  []Chest Pain - Non Cardiac []Heme (+) Stool []Ulcers  []Constipation  []Hemoptysis  []Varices  []Diarrhea  []Hypoxemia    []Nausea/Vomiting   [x]Screening   []Crohns/Colitis  []Other:     Anesthesia:   [x] MAC [] Moderate Sedation   [] General   [] None     ROS: 12 pt Review of Symptoms was negative unless mentioned above    Medications:   Prior to Admission medications    Medication Sig Start Date End Date Taking? Authorizing Provider   AUSTEDO 6 MG tablet Take 1 tablet by mouth daily 24   Provider, MD Estrellita   EPINEPHrine (EPIPEN) 0.3 MG/0.3ML SOAJ injection Inject 0.3 mLs into the muscle as needed (allergic reaction) 24   Bess Ty APRN   lisinopril (PRINIVIL;ZESTRIL) 10 MG tablet TAKE 1 TABLET BY MOUTH DAILY 24   Bess Ty APRN   acyclovir (ZOVIRAX) 400 MG tablet TAKE 2 TABLETS BY MOUTH THREE TIMES A DAY NO MORE THAN 5 DOSES A DAY 24   Bess Ty APRN   norgestimate-ethinyl estradiol (ORTHO-CYCLEN) 0.25-35 MG-MCG per tablet Take 1 tablet by mouth daily 24   Diandra

## 2024-05-24 NOTE — ANESTHESIA PRE PROCEDURE
Department of Anesthesiology  Preprocedure Note       Name:  Torito Salgado   Age:  43 y.o.  :  1980                                          MRN:  [id number]         Date:  2024      Surgeon: Surgeon(s):  Kevin Pina MD    Procedure: Procedure(s):  COLORECTAL CANCER SCREENING, NOT HIGH RISK    Medications prior to admission:   Prior to Admission medications    Medication Sig Start Date End Date Taking? Authorizing Provider   AUSTEDO 6 MG tablet Take 1 tablet by mouth daily 24   Estrellita Wilson MD   EPINEPHrine (EPIPEN) 0.3 MG/0.3ML SOAJ injection Inject 0.3 mLs into the muscle as needed (allergic reaction) 24   Bess Ty APRN   lisinopril (PRINIVIL;ZESTRIL) 10 MG tablet TAKE 1 TABLET BY MOUTH DAILY 24   Bess Ty APRN   acyclovir (ZOVIRAX) 400 MG tablet TAKE 2 TABLETS BY MOUTH THREE TIMES A DAY NO MORE THAN 5 DOSES A DAY 24   Bess Ty APRN   norgestimate-ethinyl estradiol (ORTHO-CYCLEN) 0.25-35 MG-MCG per tablet Take 1 tablet by mouth daily 24   Jennifer Jim APRN - CNP   Cranberry 50 MG CHEW Take by mouth    Estrellita Wilson MD   Multiple Vitamin (MULTI VITAMIN PO) Take by mouth    Estrellita Wilson MD   verapamil (CALAN SR) 180 MG extended release tablet Take 1 tablet by mouth daily 23   Estrellita Wilson MD   QUEtiapine (SEROQUEL) 200 MG tablet Take 1 tablet by mouth at bedtime 23   Estrellita Wilson MD   benztropine (COGENTIN) 0.5 MG tablet Take 1 tablet by mouth in the morning and at bedtime 10/2/23   Estrellita Wilson MD   diphenhydrAMINE (BENADRYL) 25 MG capsule Take 1 capsule by mouth every 6 hours as needed    Estrellita Wilson MD   acetaminophen (TYLENOL) 500 MG tablet Take 1 tablet by mouth every 6 hours as needed    Estrellita Wilson MD       Current medications:    No current facility-administered medications for this encounter.       Allergies:  No Known Allergies    Problem List:

## 2024-05-24 NOTE — DISCHARGE INSTRUCTIONS
1. Repeat colonoscopy:  -In 10 years for colon cancer screening; sooner if her personal or family history as pertaining to colorectal cancer risk changes requiring an earlier exam or if the patient were to develop lower GI symptoms such as bleeding, abdominal pain, change in bowel habits or stool caliber or if the patient has anemia or unexplained weight loss in the future.   2. - Resume previous meds and diet  - GI clinic f/u PRN   - Keep scheduled f/u appts with other MDs     POST-OP ORDERS: COLONOSCOPY:    1. Rest today.    2. DO NOT eat or drink until wide awake; eat your usual diet today in moderate amount only.    3. DO NOT drive today.    4. Call physician if you have severe pain, vomiting, fever, rectal bleeding or black bowel movements.    5.  If a biopsy was taken or a polyp removed, you should expect to hear results in about 21 days.  If you have heard nothing from your physician by then, call the office for results.    6.  Discharge home when patient awake, vitals signs stable and tolerating liquids.    7. Call with questions or concerns [phone].

## 2024-05-28 DIAGNOSIS — Z80.3 FAMILY HISTORY OF BREAST CANCER: ICD-10-CM

## 2024-05-28 DIAGNOSIS — Z80.41 FAMILY HISTORY OF OVARIAN CANCER: ICD-10-CM

## 2024-07-19 ENCOUNTER — OFFICE VISIT (OUTPATIENT)
Age: 44
End: 2024-07-19
Payer: MEDICAID

## 2024-07-19 VITALS
HEART RATE: 78 BPM | RESPIRATION RATE: 20 BRPM | WEIGHT: 164 LBS | BODY MASS INDEX: 30 KG/M2 | SYSTOLIC BLOOD PRESSURE: 116 MMHG | TEMPERATURE: 97.9 F | DIASTOLIC BLOOD PRESSURE: 76 MMHG | OXYGEN SATURATION: 100 %

## 2024-07-19 DIAGNOSIS — H10.32 ACUTE BACTERIAL CONJUNCTIVITIS OF LEFT EYE: Primary | ICD-10-CM

## 2024-07-19 PROCEDURE — 99213 OFFICE O/P EST LOW 20 MIN: CPT

## 2024-07-19 RX ORDER — TOBRAMYCIN 3 MG/ML
1 SOLUTION/ DROPS OPHTHALMIC EVERY 4 HOURS
Qty: 1 EACH | Refills: 0 | Status: SHIPPED | OUTPATIENT
Start: 2024-07-19 | End: 2024-07-26

## 2024-07-19 ASSESSMENT — ENCOUNTER SYMPTOMS
DOUBLE VISION: 0
NAUSEA: 0
RHINORRHEA: 0
COLOR CHANGE: 0
ABDOMINAL PAIN: 0
PHOTOPHOBIA: 0
SHORTNESS OF BREATH: 0
WHEEZING: 0
COUGH: 0
BLURRED VISION: 0
CONSTIPATION: 0
EYE DISCHARGE: 1
SINUS PRESSURE: 0
EYE REDNESS: 1
BLOOD IN STOOL: 0
SORE THROAT: 0
EYE ITCHING: 1
DIARRHEA: 0
VOMITING: 0
FOREIGN BODY SENSATION: 0

## 2024-07-19 NOTE — PROGRESS NOTES
[facility]  [facility]  [address] [place] [address] DRIVE  [place] [address]  Dept: [phone]  Dept Fax: [phone]  Loc: [phone]    Torito Salgado is a 43 y.o. female who presents today for her medical conditions/complaints as noted below.  Torito Salgado is complaining of Eye Problem (Left/possible pink eye/started Monday)        HPI:   Eye Problem   The left eye is affected. This is a new problem. The problem has been unchanged. There was no injury mechanism. The pain is mild. Associated symptoms include an eye discharge, eye redness and itching. Pertinent negatives include no blurred vision, double vision, fever, foreign body sensation, nausea, photophobia, recent URI or vomiting.       Past Medical History:   Diagnosis Date    Herpes     thinks it is type 1    High cholesterol     Hypertension     Schizophrenia (HCC)        Past Surgical History:   Procedure Laterality Date    COLONOSCOPY N/A 05/24/2024    Dr Pina, int hem Gr 1, 10 year recall    NOSE SURGERY         Family History   Problem Relation Age of Onset    Breast Cancer Mother         age unknown    Ovarian Cancer Maternal Grandmother         age unknown    Other Maternal Grandfather     Breast Cancer Paternal Aunt         age unknown       Social History     Tobacco Use    Smoking status: Never    Smokeless tobacco: Never   Substance Use Topics    Alcohol use: Never        Current Outpatient Medications   Medication Sig Dispense Refill    AUSTEDO 6 MG tablet Take 1 tablet by mouth daily      EPINEPHrine (EPIPEN) 0.3 MG/0.3ML SOAJ injection Inject 0.3 mLs into the muscle as needed (allergic reaction) 1 each 1    lisinopril (PRINIVIL;ZESTRIL) 10 MG tablet TAKE 1 TABLET BY MOUTH DAILY 90 tablet 1    acyclovir (ZOVIRAX) 400 MG tablet TAKE 2 TABLETS BY MOUTH THREE TIMES A DAY NO MORE THAN 5 DOSES A DAY 60 tablet 1    norgestimate-ethinyl estradiol (ORTHO-CYCLEN) 0.25-35 MG-MCG per tablet Take 1

## 2024-07-19 NOTE — PATIENT INSTRUCTIONS
- Use Tobrex as prescribed  - Recommended warm, moist compresses three to five times per day   - Wash hands frequently and avoid touching the eyes  - If applicable, discontinue eye makeup to support healing.  - If applicable, discontinue use of contacts until treatment is complete and wear glasses only.  - The patient is to follow up with PCP or return to clinic if symptoms worsen/fail to improve.     Any condition can change, despite proper treatment. Therefore, if symptoms still persist or worsen after treatment plan intitated today, either go to the nearest ER, or call PCP, or return to UC for further evaluation.    Urgent Care evaluation today is not a substitute for PCP visit. Follow up care is your responsibility to discuss and review this UC visit.     Discussed use, benefits, and side effects of any prescribed medications. All patient questions were answered. Patient demonstrates understanding and agrees with care plan. Patient was given educational materials - see patient instructions below

## 2024-07-26 ENCOUNTER — OFFICE VISIT (OUTPATIENT)
Dept: INTERNAL MEDICINE | Age: 44
End: 2024-07-26
Payer: MEDICARE

## 2024-07-26 VITALS
TEMPERATURE: 97.4 F | WEIGHT: 162 LBS | DIASTOLIC BLOOD PRESSURE: 88 MMHG | HEART RATE: 86 BPM | BODY MASS INDEX: 29.63 KG/M2 | OXYGEN SATURATION: 99 % | SYSTOLIC BLOOD PRESSURE: 138 MMHG

## 2024-07-26 DIAGNOSIS — H10.32 ACUTE BACTERIAL CONJUNCTIVITIS OF LEFT EYE: Primary | ICD-10-CM

## 2024-07-26 PROCEDURE — G8427 DOCREV CUR MEDS BY ELIG CLIN: HCPCS | Performed by: NURSE PRACTITIONER

## 2024-07-26 PROCEDURE — G8417 CALC BMI ABV UP PARAM F/U: HCPCS | Performed by: NURSE PRACTITIONER

## 2024-07-26 PROCEDURE — 99213 OFFICE O/P EST LOW 20 MIN: CPT | Performed by: NURSE PRACTITIONER

## 2024-07-26 PROCEDURE — 1036F TOBACCO NON-USER: CPT | Performed by: NURSE PRACTITIONER

## 2024-07-26 RX ORDER — CIPROFLOXACIN HYDROCHLORIDE 3.5 MG/ML
1 SOLUTION/ DROPS TOPICAL 3 TIMES DAILY
Qty: 5 ML | Refills: 0 | Status: SHIPPED | OUTPATIENT
Start: 2024-07-26 | End: 2024-08-02

## 2024-07-26 ASSESSMENT — ENCOUNTER SYMPTOMS
EYE DISCHARGE: 1
COUGH: 0
EYE ITCHING: 1
EYE REDNESS: 1

## 2024-07-26 NOTE — PROGRESS NOTES
[facility]  [facility]  [address] [place] [address] DRIVE  [address]  [place] [address]  Dept: [phone]  Dept Fax: [phone]  Loc: [phone]    Torito Salgado is a 43 y.o. female who presents today for her medical conditions/complaintsas noted below.  Torito Salgado is c/o of Conjunctivitis        HPI:     Conjunctivitis   Associated symptoms include eye itching, eye discharge and eye redness. Pertinent negatives include no fever, no congestion and no cough.     Torito presents today with pink eye. She was seen at  7/19 and given tobrex drops. Pt does wear contacts. She reports initially she tried a new eye liner and then developed symptoms. She has switched out her makeup. Thought it was allergies and did try an otc allergy drop that didn't help. Went to  and was given the tobrex.   Past Medical History:   Diagnosis Date    Herpes     thinks it is type 1    High cholesterol     Hypertension     Schizophrenia (HCC)      Past Surgical History:   Procedure Laterality Date    COLONOSCOPY N/A 05/24/2024    Dr Pina, int hem Gr 1, 10 year recall    NOSE SURGERY         Family History   Problem Relation Age of Onset    Breast Cancer Mother         age unknown    Ovarian Cancer Maternal Grandmother         age unknown    Other Maternal Grandfather     Breast Cancer Paternal Aunt         age unknown       Social History     Tobacco Use    Smoking status: Never    Smokeless tobacco: Never   Substance Use Topics    Alcohol use: Never      Current Outpatient Medications   Medication Sig Dispense Refill    tobramycin (TOBREX) 0.3 % ophthalmic solution Place 1 drop into the left eye every 4 hours for 7 days 1 each 0    AUSTEDO 6 MG tablet Take 1 tablet by mouth daily      EPINEPHrine (EPIPEN) 0.3 MG/0.3ML SOAJ injection Inject 0.3 mLs into the muscle as needed (allergic reaction) 1 each 1    lisinopril (PRINIVIL;ZESTRIL) 10 MG tablet TAKE 1 TABLET BY MOUTH DAILY 90 tablet 1

## 2024-08-16 DIAGNOSIS — I10 HYPERTENSION, UNSPECIFIED TYPE: ICD-10-CM

## 2024-08-16 RX ORDER — LISINOPRIL 10 MG/1
10 TABLET ORAL DAILY
Qty: 90 TABLET | Refills: 1 | Status: SHIPPED | OUTPATIENT
Start: 2024-08-16

## 2024-08-16 NOTE — TELEPHONE ENCOUNTER
Last OV 7/26/2024  Next OV 1/28/2025      Requested Prescriptions     Pending Prescriptions Disp Refills    lisinopril (PRINIVIL;ZESTRIL) 10 MG tablet [Pharmacy Med Name: Lisinopril 10MG TABS] 90 tablet 1     Sig: TAKE 1 TABLET BY MOUTH DAILY

## 2024-09-23 DIAGNOSIS — B00.9 HERPES: ICD-10-CM

## 2024-09-23 RX ORDER — ACYCLOVIR 400 MG/1
TABLET ORAL
Qty: 60 TABLET | Refills: 1 | Status: SHIPPED | OUTPATIENT
Start: 2024-09-23

## 2024-11-25 NOTE — TELEPHONE ENCOUNTER
Torito called requesting a refill of the below medication which has been pended for you:     Requested Prescriptions     Pending Prescriptions Disp Refills    verapamil (CALAN SR) 180 MG extended release tablet [Pharmacy Med Name: Verapamil HCl ER 180MG TBCR] 30 tablet      Sig: TAKE 1 TABLET BY MOUTH DAILY       Last Appointment Date: 7/26/2024  Next Appointment Date: 12/9/2024    No Known Allergies

## 2024-11-26 RX ORDER — VERAPAMIL HYDROCHLORIDE 180 MG/1
180 TABLET, EXTENDED RELEASE ORAL DAILY
Qty: 30 TABLET | Refills: 0 | Status: SHIPPED | OUTPATIENT
Start: 2024-11-26

## 2024-12-06 SDOH — HEALTH STABILITY: PHYSICAL HEALTH: ON AVERAGE, HOW MANY MINUTES DO YOU ENGAGE IN EXERCISE AT THIS LEVEL?: 0 MIN

## 2024-12-06 SDOH — HEALTH STABILITY: PHYSICAL HEALTH: ON AVERAGE, HOW MANY DAYS PER WEEK DO YOU ENGAGE IN MODERATE TO STRENUOUS EXERCISE (LIKE A BRISK WALK)?: 0 DAYS

## 2024-12-09 ENCOUNTER — OFFICE VISIT (OUTPATIENT)
Dept: INTERNAL MEDICINE | Age: 44
End: 2024-12-09
Payer: MEDICARE

## 2024-12-09 VITALS — BODY MASS INDEX: 30.54 KG/M2 | HEART RATE: 95 BPM | TEMPERATURE: 97.4 F | WEIGHT: 167 LBS | OXYGEN SATURATION: 99 %

## 2024-12-09 DIAGNOSIS — E66.09 CLASS 1 OBESITY DUE TO EXCESS CALORIES WITHOUT SERIOUS COMORBIDITY WITH BODY MASS INDEX (BMI) OF 30.0 TO 30.9 IN ADULT: ICD-10-CM

## 2024-12-09 DIAGNOSIS — T78.40XA ALLERGIC REACTION, INITIAL ENCOUNTER: ICD-10-CM

## 2024-12-09 DIAGNOSIS — I10 HYPERTENSION, UNSPECIFIED TYPE: ICD-10-CM

## 2024-12-09 DIAGNOSIS — E78.5 HYPERLIPIDEMIA, UNSPECIFIED HYPERLIPIDEMIA TYPE: ICD-10-CM

## 2024-12-09 DIAGNOSIS — E66.811 CLASS 1 OBESITY DUE TO EXCESS CALORIES WITHOUT SERIOUS COMORBIDITY WITH BODY MASS INDEX (BMI) OF 30.0 TO 30.9 IN ADULT: ICD-10-CM

## 2024-12-09 DIAGNOSIS — B00.9 HERPES: ICD-10-CM

## 2024-12-09 DIAGNOSIS — Z13.29 SCREENING FOR THYROID DISORDER: ICD-10-CM

## 2024-12-09 DIAGNOSIS — Z76.89 ENCOUNTER TO ESTABLISH CARE: Primary | ICD-10-CM

## 2024-12-09 PROCEDURE — 1036F TOBACCO NON-USER: CPT | Performed by: NURSE PRACTITIONER

## 2024-12-09 PROCEDURE — 99214 OFFICE O/P EST MOD 30 MIN: CPT | Performed by: NURSE PRACTITIONER

## 2024-12-09 PROCEDURE — G8417 CALC BMI ABV UP PARAM F/U: HCPCS | Performed by: NURSE PRACTITIONER

## 2024-12-09 PROCEDURE — G8427 DOCREV CUR MEDS BY ELIG CLIN: HCPCS | Performed by: NURSE PRACTITIONER

## 2024-12-09 PROCEDURE — G8484 FLU IMMUNIZE NO ADMIN: HCPCS | Performed by: NURSE PRACTITIONER

## 2024-12-09 RX ORDER — EPINEPHRINE 0.3 MG/.3ML
0.3 INJECTION SUBCUTANEOUS PRN
Qty: 1 EACH | Refills: 1 | Status: SHIPPED | OUTPATIENT
Start: 2024-12-09

## 2024-12-09 RX ORDER — VERAPAMIL HYDROCHLORIDE 180 MG/1
180 TABLET, EXTENDED RELEASE ORAL DAILY
Qty: 30 TABLET | Refills: 0 | Status: SHIPPED | OUTPATIENT
Start: 2024-12-09

## 2024-12-09 RX ORDER — LISINOPRIL 10 MG/1
10 TABLET ORAL DAILY
Qty: 90 TABLET | Refills: 1 | Status: SHIPPED | OUTPATIENT
Start: 2024-12-09

## 2024-12-09 RX ORDER — ACYCLOVIR 400 MG/1
400 TABLET ORAL
Qty: 60 TABLET | Refills: 2 | Status: SHIPPED | OUTPATIENT
Start: 2024-12-09

## 2024-12-09 ASSESSMENT — ENCOUNTER SYMPTOMS
RESPIRATORY NEGATIVE: 1
GASTROINTESTINAL NEGATIVE: 1
ALLERGIC/IMMUNOLOGIC NEGATIVE: 1
EYES NEGATIVE: 1

## 2024-12-09 NOTE — PROGRESS NOTES
[facility]  [facility] INTERNAL MEDICINE  [address] [place] [address] DRIVE  [address]  [place] [address]  Dept: [phone]  Dept Fax: [phone]  Loc: [phone]    Torito Salgado is a 44 y.o. female who presents today for her medical conditions/complaints as noted below.  Torito Salgado is c/o of New Patient        HPI:   She presents to [facility].  She was a previous patient of KENDY Connell.  No recent labs. Mammogram scheduled 12/27/24.    She has a history of high blood pressure that is stable with Lisinopril 10 mg daily and Verapamil 180 mg daily.    She has a history of herpes that is stable with acyclovir.    When reviewing previous blood work, cholesterol was elevated at 203,  back in April 2024.  She admits to eating a lot of mcgovern.    She is requesting refill on Epipen.      KENDY Ramos Psychiatrist   HPI   Chief Complaint   Patient presents with    New Patient     Past Medical History:   Diagnosis Date    Herpes     thinks it is type 1    High cholesterol     Hypertension     Schizophrenia (HCC)       Past Surgical History:   Procedure Laterality Date    COLONOSCOPY N/A 05/24/2024    Dr Pina, int hem Gr 1, 10 year recall    NOSE SURGERY             12/9/2024     2:59 PM 7/26/2024     9:44 AM 7/19/2024    10:04 AM 5/24/2024    10:55 AM 5/24/2024    10:40 AM 5/24/2024     9:30 AM   Vitals   SYSTOLIC  138 116 131 128 121   DIASTOLIC  88 76 90 79 76   Site  Left Upper Arm       Pulse 95 86 78 65 80 81   Temp 97.4 °F (36.3 °C) 97.4 °F (36.3 °C) 97.9 °F (36.6 °C)  98.4 °F (36.9 °C) 97.5 °F (36.4 °C)   Respirations   20 18 18 16   SpO2 99 % 99 % 100 % 100 % 100 % 97 %   Weight - Scale 167 lb 162 lb 164 lb   160 lb   Height      5' 2\"   Body Mass Index      29.26 kg/m2       Family History   Problem Relation Age of Onset    Breast Cancer Mother         age unknown    Ovarian Cancer Maternal Grandmother         age unknown    Other Maternal Grandfather

## 2024-12-27 ENCOUNTER — HOSPITAL ENCOUNTER (OUTPATIENT)
Dept: WOMENS IMAGING | Age: 44
Discharge: HOME | End: 2024-12-27
Payer: MEDICARE

## 2024-12-27 VITALS — WEIGHT: 168 LBS | BODY MASS INDEX: 30.91 KG/M2 | HEIGHT: 62 IN

## 2024-12-27 DIAGNOSIS — Z12.31 SCREENING MAMMOGRAM FOR BREAST CANCER: ICD-10-CM

## 2024-12-27 PROCEDURE — 77063 BREAST TOMOSYNTHESIS BI: CPT

## 2025-01-21 RX ORDER — VERAPAMIL HYDROCHLORIDE 180 MG/1
180 TABLET, EXTENDED RELEASE ORAL DAILY
Qty: 90 TABLET | Refills: 0 | Status: SHIPPED | OUTPATIENT
Start: 2025-01-21

## 2025-01-21 NOTE — TELEPHONE ENCOUNTER
Torito called requesting a refill of the below medication which has been pended for you:     Requested Prescriptions     Pending Prescriptions Disp Refills    verapamil (CALAN SR) 180 MG extended release tablet [Pharmacy Med Name: Verapamil HCl ER 180MG TBCR] 30 tablet 0     Sig: TAKE 1 TABLET BY MOUTH DAILY       Last Appointment Date: 12/9/2024  Next Appointment Date: 4/28/2025    No Known Allergies

## 2025-01-31 DIAGNOSIS — Z30.41 ENCOUNTER FOR SURVEILLANCE OF CONTRACEPTIVE PILLS: ICD-10-CM

## 2025-01-31 RX ORDER — NORGESTIMATE AND ETHINYL ESTRADIOL 0.25-0.035
1 KIT ORAL DAILY
Qty: 28 TABLET | Refills: 1 | Status: SHIPPED | OUTPATIENT
Start: 2025-01-31

## 2025-03-19 ENCOUNTER — OFFICE VISIT (OUTPATIENT)
Dept: OBGYN CLINIC | Age: 45
End: 2025-03-19
Payer: MEDICARE

## 2025-03-19 VITALS
SYSTOLIC BLOOD PRESSURE: 145 MMHG | HEIGHT: 62 IN | HEART RATE: 83 BPM | DIASTOLIC BLOOD PRESSURE: 101 MMHG | BODY MASS INDEX: 30.64 KG/M2 | WEIGHT: 166.5 LBS

## 2025-03-19 DIAGNOSIS — Z12.39 ENCOUNTER FOR SCREENING BREAST EXAMINATION: ICD-10-CM

## 2025-03-19 DIAGNOSIS — Z12.4 ENCOUNTER FOR SCREENING FOR CERVICAL CANCER: ICD-10-CM

## 2025-03-19 DIAGNOSIS — Z11.51 SCREENING FOR HPV (HUMAN PAPILLOMAVIRUS): ICD-10-CM

## 2025-03-19 DIAGNOSIS — R14.0 ABDOMINAL BLOATING: ICD-10-CM

## 2025-03-19 DIAGNOSIS — Z01.419 ENCOUNTER FOR ANNUAL ROUTINE GYNECOLOGICAL EXAMINATION: Primary | ICD-10-CM

## 2025-03-19 DIAGNOSIS — D21.9 FIBROIDS: ICD-10-CM

## 2025-03-19 DIAGNOSIS — Z87.42 HISTORY OF ABNORMAL CERVICAL PAP SMEAR: ICD-10-CM

## 2025-03-19 DIAGNOSIS — Z12.31 SCREENING MAMMOGRAM FOR BREAST CANCER: ICD-10-CM

## 2025-03-19 PROCEDURE — 99396 PREV VISIT EST AGE 40-64: CPT

## 2025-03-19 ASSESSMENT — PATIENT HEALTH QUESTIONNAIRE - PHQ9
1. LITTLE INTEREST OR PLEASURE IN DOING THINGS: SEVERAL DAYS
SUM OF ALL RESPONSES TO PHQ QUESTIONS 1-9: 2
SUM OF ALL RESPONSES TO PHQ QUESTIONS 1-9: 2
2. FEELING DOWN, DEPRESSED OR HOPELESS: SEVERAL DAYS
SUM OF ALL RESPONSES TO PHQ QUESTIONS 1-9: 2
SUM OF ALL RESPONSES TO PHQ QUESTIONS 1-9: 2

## 2025-03-19 NOTE — PROGRESS NOTES
[facility] OB/GYN  CNM Office Note    Torito Salgado is a 44 y.o. female who presents today for her medical conditions/ complaints as noted below.  Chief Complaint   Patient presents with    Annual Exam     Mammo: 2024  Pap smear: 2024  Contraception: OCP    P: 0  Ab: 0  Bone density: n/a  Colonoscopy: 2024  Depression Screen: 2    HPI  Torito Salgado presents today for annual exam. She is due for pap smear and mammogram. She reports history of abnormal pap smears and denies history of abnormal mammograms. She reports family history of breast cancer in paternal aunt, and maternal grandmother had ovarian cancer. She had testing for ovarian cancer markers and it was negative. She reports her periods are regular every 28-30 days. She reports her menses are not problematic on OCP. She has history of uterine fibroids and has been feeling very bloated. She also has low back pain occasionally. She denies issues with bladder, bowel. She is not currently sexually active. Her choice of contraception is: oral contraceptives (estrogen/progesterone). She feels her sleep pattern and quality is Good.      Problems/Complaints today:  1. Encounter for annual routine gynecological examination  2. Encounter for screening for cervical cancer  -     PAP SMEAR  3. Screening mammogram for breast cancer  -     [facility] DIGITAL SCREEN W OR WO CAD BILATERAL; Future  4. History of abnormal cervical Pap smear  5. Encounter for screening breast examination  -     [facility] DIGITAL SCREEN W OR WO CAD BILATERAL; Future  6. Fibroids  -     US NON OB TRANSVAGINAL; Future  7. Abdominal bloating  -     US NON OB TRANSVAGINAL; Future  8. Screening for HPV (human papillomavirus)  -     Human papillomavirus (HPV) DNA Probe Thin Prep High Risk       There is no problem list on file for this patient.      Patient's last menstrual period was 2025 (exact date).      Past Medical History:   Diagnosis Date    Herpes     thinks it is

## 2025-03-19 NOTE — PROGRESS NOTES
Pt presents today for pap smear and breast exam.  Pt would like to discuss options regarding imagining and discuss mini pill.    Mammo: 2024  Pap smear: 2024  Contraception: OCP    P: 0  Ab: 0  Bone density: n/a  Colonoscopy: 2024  Depression Screen: 2

## 2025-03-25 ENCOUNTER — RESULTS FOLLOW-UP (OUTPATIENT)
Dept: OBGYN CLINIC | Age: 45
End: 2025-03-25

## 2025-03-25 ENCOUNTER — TELEPHONE (OUTPATIENT)
Dept: OBGYN CLINIC | Age: 45
End: 2025-03-25

## 2025-03-25 NOTE — TELEPHONE ENCOUNTER
Torito saw Jennifer Jim on 3- for her well woman visit. She was supposed to call in a new birth control for her to [facility] and they do not have anything yet.   She also wants to ask about the Ultrasound that she is supposed to have ordered.     # [phone]     Thank you

## 2025-03-26 RX ORDER — ACETAMINOPHEN AND CODEINE PHOSPHATE 120; 12 MG/5ML; MG/5ML
1 SOLUTION ORAL DAILY
Qty: 84 TABLET | Refills: 3 | Status: SHIPPED | OUTPATIENT
Start: 2025-03-26

## 2025-03-26 ASSESSMENT — ENCOUNTER SYMPTOMS
CHEST TIGHTNESS: 0
SHORTNESS OF BREATH: 0
RECTAL PAIN: 0
CONSTIPATION: 0
DIARRHEA: 0
BACK PAIN: 0
ABDOMINAL PAIN: 0
NAUSEA: 0
RESPIRATORY NEGATIVE: 1
GASTROINTESTINAL NEGATIVE: 1

## 2025-03-27 DIAGNOSIS — R14.0 ABDOMINAL BLOATING: ICD-10-CM

## 2025-03-27 DIAGNOSIS — D21.9 FIBROIDS: Primary | ICD-10-CM

## 2025-04-10 ENCOUNTER — HOSPITAL ENCOUNTER (OUTPATIENT)
Dept: ULTRASOUND IMAGING | Age: 45
Discharge: HOME | End: 2025-04-10
Payer: MEDICARE

## 2025-04-10 DIAGNOSIS — R14.0 ABDOMINAL BLOATING: ICD-10-CM

## 2025-04-10 DIAGNOSIS — D21.9 FIBROIDS: ICD-10-CM

## 2025-04-10 PROCEDURE — 76830 TRANSVAGINAL US NON-OB: CPT

## 2025-04-21 DIAGNOSIS — E66.09 CLASS 1 OBESITY DUE TO EXCESS CALORIES WITHOUT SERIOUS COMORBIDITY WITH BODY MASS INDEX (BMI) OF 30.0 TO 30.9 IN ADULT: ICD-10-CM

## 2025-04-21 DIAGNOSIS — E78.5 HYPERLIPIDEMIA, UNSPECIFIED HYPERLIPIDEMIA TYPE: ICD-10-CM

## 2025-04-21 DIAGNOSIS — E66.811 CLASS 1 OBESITY DUE TO EXCESS CALORIES WITHOUT SERIOUS COMORBIDITY WITH BODY MASS INDEX (BMI) OF 30.0 TO 30.9 IN ADULT: ICD-10-CM

## 2025-04-21 DIAGNOSIS — Z13.29 SCREENING FOR THYROID DISORDER: ICD-10-CM

## 2025-04-21 DIAGNOSIS — I10 HYPERTENSION, UNSPECIFIED TYPE: ICD-10-CM

## 2025-04-21 LAB
ALBUMIN SERPL-MCNC: 4.2 G/DL (ref 3.5–5.2)
ALP SERPL-CCNC: 89 U/L (ref 35–104)
ALT SERPL-CCNC: 23 U/L (ref 10–35)
ANION GAP SERPL CALCULATED.3IONS-SCNC: 11 MMOL/L (ref 8–16)
AST SERPL-CCNC: 25 U/L (ref 10–35)
BASOPHILS # BLD: 0 K/UL (ref 0–0.2)
BASOPHILS NFR BLD: 0.7 % (ref 0–1)
BILIRUB SERPL-MCNC: 0.4 MG/DL (ref 0.2–1.2)
BUN SERPL-MCNC: 10 MG/DL (ref 6–20)
CALCIUM SERPL-MCNC: 9.2 MG/DL (ref 8.6–10)
CHLORIDE SERPL-SCNC: 104 MMOL/L (ref 98–107)
CHOLEST SERPL-MCNC: 222 MG/DL (ref 0–199)
CO2 SERPL-SCNC: 23 MMOL/L (ref 22–29)
CREAT SERPL-MCNC: 0.9 MG/DL (ref 0.5–0.9)
EOSINOPHIL # BLD: 0.1 K/UL (ref 0–0.6)
EOSINOPHIL NFR BLD: 1.6 % (ref 0–5)
ERYTHROCYTE [DISTWIDTH] IN BLOOD BY AUTOMATED COUNT: 12.1 % (ref 11.5–14.5)
GLUCOSE SERPL-MCNC: 77 MG/DL (ref 70–99)
HBA1C MFR BLD: 5.4 % (ref 4–5.6)
HCT VFR BLD AUTO: 45.4 % (ref 37–47)
HDLC SERPL-MCNC: 60 MG/DL (ref 40–60)
HGB BLD-MCNC: 15 G/DL (ref 12–16)
IMM GRANULOCYTES # BLD: 0 K/UL
LDLC SERPL CALC-MCNC: 149 MG/DL
LYMPHOCYTES # BLD: 2 K/UL (ref 1.1–4.5)
LYMPHOCYTES NFR BLD: 36.1 % (ref 20–40)
MCH RBC QN AUTO: 29.5 PG (ref 27–31)
MCHC RBC AUTO-ENTMCNC: 33 G/DL (ref 33–37)
MCV RBC AUTO: 89.2 FL (ref 81–99)
MONOCYTES # BLD: 0.6 K/UL (ref 0–0.9)
MONOCYTES NFR BLD: 10.5 % (ref 0–10)
NEUTROPHILS # BLD: 2.8 K/UL (ref 1.5–7.5)
NEUTS SEG NFR BLD: 50.9 % (ref 50–65)
PLATELET # BLD AUTO: 340 K/UL (ref 130–400)
PMV BLD AUTO: 8.5 FL (ref 9.4–12.3)
POTASSIUM SERPL-SCNC: 3.7 MMOL/L (ref 3.5–5.1)
PROT SERPL-MCNC: 7.6 G/DL (ref 6.4–8.3)
RBC # BLD AUTO: 5.09 M/UL (ref 4.2–5.4)
SODIUM SERPL-SCNC: 138 MMOL/L (ref 136–145)
T4 FREE SERPL-MCNC: 1.04 NG/DL (ref 0.93–1.7)
TRIGL SERPL-MCNC: 67 MG/DL (ref 0–149)
TSH SERPL DL<=0.005 MIU/L-ACNC: 0.26 UIU/ML (ref 0.27–4.2)
WBC # BLD AUTO: 5.5 K/UL (ref 4.8–10.8)

## 2025-04-21 RX ORDER — VERAPAMIL HYDROCHLORIDE 180 MG/1
180 TABLET, FILM COATED, EXTENDED RELEASE ORAL DAILY
Qty: 30 TABLET | Refills: 0 | Status: SHIPPED | OUTPATIENT
Start: 2025-04-21

## 2025-04-21 SDOH — HEALTH STABILITY: PHYSICAL HEALTH: ON AVERAGE, HOW MANY DAYS PER WEEK DO YOU ENGAGE IN MODERATE TO STRENUOUS EXERCISE (LIKE A BRISK WALK)?: 0 DAYS

## 2025-04-21 SDOH — HEALTH STABILITY: PHYSICAL HEALTH: ON AVERAGE, HOW MANY MINUTES DO YOU ENGAGE IN EXERCISE AT THIS LEVEL?: 0 MIN

## 2025-04-21 NOTE — TELEPHONE ENCOUNTER
Last OV 12/9/2024  Next OV 4/29/2025      Requested Prescriptions     Pending Prescriptions Disp Refills    verapamil (CALAN SR) 180 MG extended release tablet [Pharmacy Med Name: Verapamil HCl ER 180MG TBCR] 30 tablet      Sig: TAKE 1 TABLET BY MOUTH DAILY

## 2025-04-22 ENCOUNTER — OFFICE VISIT (OUTPATIENT)
Dept: INTERNAL MEDICINE | Age: 45
End: 2025-04-22
Payer: MEDICARE

## 2025-04-22 ENCOUNTER — RESULTS FOLLOW-UP (OUTPATIENT)
Dept: INTERNAL MEDICINE | Age: 45
End: 2025-04-22

## 2025-04-22 VITALS
BODY MASS INDEX: 30.36 KG/M2 | SYSTOLIC BLOOD PRESSURE: 150 MMHG | WEIGHT: 165 LBS | HEART RATE: 101 BPM | OXYGEN SATURATION: 100 % | HEIGHT: 62 IN | DIASTOLIC BLOOD PRESSURE: 100 MMHG

## 2025-04-22 DIAGNOSIS — F20.0 PARANOID SCHIZOPHRENIA (HCC): ICD-10-CM

## 2025-04-22 DIAGNOSIS — B00.9 HERPES: ICD-10-CM

## 2025-04-22 DIAGNOSIS — I10 HYPERTENSION, UNSPECIFIED TYPE: Primary | ICD-10-CM

## 2025-04-22 DIAGNOSIS — G47.33 OBSTRUCTIVE SLEEP APNEA SYNDROME: ICD-10-CM

## 2025-04-22 PROCEDURE — G8417 CALC BMI ABV UP PARAM F/U: HCPCS | Performed by: INTERNAL MEDICINE

## 2025-04-22 PROCEDURE — G8427 DOCREV CUR MEDS BY ELIG CLIN: HCPCS | Performed by: INTERNAL MEDICINE

## 2025-04-22 PROCEDURE — 3080F DIAST BP >= 90 MM HG: CPT | Performed by: INTERNAL MEDICINE

## 2025-04-22 PROCEDURE — 3077F SYST BP >= 140 MM HG: CPT | Performed by: INTERNAL MEDICINE

## 2025-04-22 PROCEDURE — 1036F TOBACCO NON-USER: CPT | Performed by: INTERNAL MEDICINE

## 2025-04-22 PROCEDURE — 99214 OFFICE O/P EST MOD 30 MIN: CPT | Performed by: INTERNAL MEDICINE

## 2025-04-22 RX ORDER — DEUTETRABENAZINE 12 MG/1
TABLET, COATED ORAL
COMMUNITY
Start: 2025-04-21

## 2025-04-22 SDOH — ECONOMIC STABILITY: FOOD INSECURITY: WITHIN THE PAST 12 MONTHS, YOU WORRIED THAT YOUR FOOD WOULD RUN OUT BEFORE YOU GOT MONEY TO BUY MORE.: NEVER TRUE

## 2025-04-22 SDOH — ECONOMIC STABILITY: FOOD INSECURITY: WITHIN THE PAST 12 MONTHS, THE FOOD YOU BOUGHT JUST DIDN'T LAST AND YOU DIDN'T HAVE MONEY TO GET MORE.: NEVER TRUE

## 2025-04-22 ASSESSMENT — ENCOUNTER SYMPTOMS
TROUBLE SWALLOWING: 0
VOMITING: 0
NAUSEA: 0
ABDOMINAL PAIN: 0
ABDOMINAL DISTENTION: 0
BLOOD IN STOOL: 0
EYE ITCHING: 0
SINUS PRESSURE: 0
EYE DISCHARGE: 0
SHORTNESS OF BREATH: 0
SORE THROAT: 0
WHEEZING: 0
BACK PAIN: 0

## 2025-04-22 NOTE — PROGRESS NOTES
[facility]  [facility] INTERNAL MEDICINE  [address] [place] [address] DRIVE  [address]  [place] [address]  Dept: [phone]  Dept Fax: [phone]  Loc: [phone]      Visit Date: 4/22/2025    Torito Shannon a 44 y.o. female who presents today for:  Chief Complaint   Patient presents with    New Patient         HPI:     Patient seen for follow-up.  She was patient is a para crumbles and with blood pressure issues and increased cholesterol as well as bipolar disorder.    Past Medical History:   Diagnosis Date    Anxiety     Bipolar disorder     Chronic back pain     Depression     Headache     Herpes     thinks it is type 1    High cholesterol     Hypertension     Schizophrenia     Sleep apnea       Past Surgical History:   Procedure Laterality Date    COLONOSCOPY N/A 05/24/2024    Dr Pina, int hem Gr 1, 10 year recall    COSMETIC SURGERY      NOSE SURGERY         Family History   Problem Relation Age of Onset    Allergy (Severe) Mother     Anemia Mother     High Blood Pressure Mother     High Cholesterol Mother     Ovarian Cancer Maternal Grandmother         age unknown    Cancer Maternal Grandmother     Mental Illness Maternal Grandmother     Breast Cancer Paternal Aunt         age unknown    Alcohol Abuse Father     Depression Father     Breast Cancer Paternal Aunt        Social History     Tobacco Use    Smoking status: Never    Smokeless tobacco: Never   Substance Use Topics    Alcohol use: Never      Current Outpatient Medications   Medication Sig Dispense Refill    AUSTEDO 12 MG TABS       verapamil (CALAN SR) 180 MG extended release tablet TAKE 1 TABLET BY MOUTH DAILY 30 tablet 0    norethindrone (ORTHO MICRONOR) 0.35 MG tablet Take 1 tablet by mouth daily 84 tablet 3    EPINEPHrine (EPIPEN) 0.3 MG/0.3ML SOAJ injection Inject 0.3 mLs into the muscle as needed (allergic reaction) 1 each 1    acyclovir (ZOVIRAX) 400 MG tablet Take 1 tablet by mouth 5 times daily 60 tablet 2

## 2025-05-05 ENCOUNTER — TELEPHONE (OUTPATIENT)
Dept: OBGYN CLINIC | Age: 45
End: 2025-05-05

## 2025-05-05 NOTE — TELEPHONE ENCOUNTER
Torito would like a call to discuss  Test results  . Torito preferred call back time is  Anytime     Thank you.

## 2025-05-06 ENCOUNTER — RESULTS FOLLOW-UP (OUTPATIENT)
Dept: OBGYN CLINIC | Age: 45
End: 2025-05-06

## 2025-05-16 ENCOUNTER — OFFICE VISIT (OUTPATIENT)
Dept: OBGYN CLINIC | Age: 45
End: 2025-05-16
Payer: MEDICARE

## 2025-05-16 VITALS
DIASTOLIC BLOOD PRESSURE: 104 MMHG | WEIGHT: 167 LBS | HEART RATE: 90 BPM | HEIGHT: 62 IN | BODY MASS INDEX: 30.73 KG/M2 | SYSTOLIC BLOOD PRESSURE: 147 MMHG

## 2025-05-16 DIAGNOSIS — F40.232 FEAR OF OTHER MEDICAL CARE: ICD-10-CM

## 2025-05-16 DIAGNOSIS — D21.9 FIBROIDS: Primary | ICD-10-CM

## 2025-05-16 DIAGNOSIS — R14.0 ABDOMINAL BLOATING: ICD-10-CM

## 2025-05-16 PROCEDURE — G8417 CALC BMI ABV UP PARAM F/U: HCPCS

## 2025-05-16 PROCEDURE — 3080F DIAST BP >= 90 MM HG: CPT

## 2025-05-16 PROCEDURE — 3077F SYST BP >= 140 MM HG: CPT

## 2025-05-16 PROCEDURE — 1036F TOBACCO NON-USER: CPT

## 2025-05-16 PROCEDURE — G8427 DOCREV CUR MEDS BY ELIG CLIN: HCPCS

## 2025-05-16 PROCEDURE — 99214 OFFICE O/P EST MOD 30 MIN: CPT

## 2025-05-16 NOTE — PROGRESS NOTES
[facility] OB/GYN  CN Office Note    Torito Salgado is a 44 y.o. female who presents today for her medical conditions/ complaints as noted below.  Chief Complaint   Patient presents with    Fibroids     HPI  History of Present Illness  The patient is a 44-year-old female who presents for evaluation of uterine fibroids.    She reports a delay in her menstrual cycle, which started a week later than expected on Wednesday. She attributes this to a recent change in her contraceptive medication from Ortho-Cyclen to Ortho Micronor, a progestin-only pill. She is not sexually active and has no history of smoking. She expresses a desire to undergo a hysterectomy. She has not previously consulted with Dr. oPmpa. She also mentions a history of ovarian cysts, predominantly on the right side, and is curious about the necessity of continuing birth control post-hysterectomy to manage potential cyst formation.    She has been experiencing back pain, which she plans to alleviate with a compress upon returning home.    GYNECOLOGICAL HISTORY:  - Last menstrual period: 2025    CONTRACEPTION:  Currently using Ortho Micronor, a progestin-only pill. Previously used Ortho-Cyclen.    SOCIAL HISTORY  She does not smoke.       Problems/Complaints today:  1. Fibroids  -     HYDROcodone-acetaminophen (NORCO) 5-325 MG per tablet; Take 1 tablet by mouth every 6 hours as needed for Pain for up to 2 doses. Take 1 tablet by mouth prior to and after procedure Max Daily Amount: 4 tablets, Disp-2 tablet, R-0Normal  -     miSOPROStol (CYTOTEC) 100 MCG tablet; Take 2 tablets by mouth the night prior to and morning of procedure, Disp-4 tablet, R-0Normal  2. Abdominal bloating  3. Fear of other medical care       Patient Active Problem List   Diagnosis    Hypertension    Sleep apnea    Schizophrenia (HCC)    Herpes    Depression       Patient's last menstrual period was 2025 (exact date).      Past Medical History:

## 2025-05-19 RX ORDER — VERAPAMIL HYDROCHLORIDE 180 MG/1
180 TABLET, FILM COATED, EXTENDED RELEASE ORAL DAILY
Qty: 30 TABLET | Refills: 5 | Status: SHIPPED | OUTPATIENT
Start: 2025-05-19

## 2025-05-20 RX ORDER — HYDROCODONE BITARTRATE AND ACETAMINOPHEN 5; 325 MG/1; MG/1
1 TABLET ORAL EVERY 6 HOURS PRN
Qty: 2 TABLET | Refills: 0 | Status: SHIPPED | OUTPATIENT
Start: 2025-05-20 | End: 2025-05-23

## 2025-05-20 RX ORDER — MISOPROSTOL 100 UG/1
TABLET ORAL
Qty: 4 TABLET | Refills: 0 | Status: SHIPPED | OUTPATIENT
Start: 2025-05-20

## 2025-05-20 ASSESSMENT — ENCOUNTER SYMPTOMS
RESPIRATORY NEGATIVE: 1
SHORTNESS OF BREATH: 0
DIARRHEA: 0
BACK PAIN: 0
CHEST TIGHTNESS: 0
RECTAL PAIN: 0
GASTROINTESTINAL NEGATIVE: 1
CONSTIPATION: 0
NAUSEA: 0
ABDOMINAL PAIN: 0

## 2025-05-23 ENCOUNTER — PROCEDURE VISIT (OUTPATIENT)
Dept: OBGYN CLINIC | Age: 45
End: 2025-05-23

## 2025-05-23 VITALS
SYSTOLIC BLOOD PRESSURE: 132 MMHG | HEART RATE: 89 BPM | WEIGHT: 161 LBS | BODY MASS INDEX: 29.63 KG/M2 | DIASTOLIC BLOOD PRESSURE: 92 MMHG | HEIGHT: 62 IN

## 2025-05-23 DIAGNOSIS — R14.0 ABDOMINAL BLOATING: ICD-10-CM

## 2025-05-23 DIAGNOSIS — Z30.41 ENCOUNTER FOR SURVEILLANCE OF CONTRACEPTIVE PILLS: ICD-10-CM

## 2025-05-23 DIAGNOSIS — D21.9 FIBROIDS: ICD-10-CM

## 2025-05-23 DIAGNOSIS — N85.2 ENLARGED UTERUS: Primary | ICD-10-CM

## 2025-05-23 DIAGNOSIS — Z01.812 PRE-PROCEDURE LAB EXAM: ICD-10-CM

## 2025-05-23 LAB
CONTROL: NORMAL
PREGNANCY TEST URINE, POC: NEGATIVE

## 2025-05-23 ASSESSMENT — ENCOUNTER SYMPTOMS
ABDOMINAL PAIN: 0
BACK PAIN: 0
RESPIRATORY NEGATIVE: 1
NAUSEA: 0
CONSTIPATION: 0
RECTAL PAIN: 0
CHEST TIGHTNESS: 0
DIARRHEA: 0
GASTROINTESTINAL NEGATIVE: 1
SHORTNESS OF BREATH: 0

## 2025-05-23 NOTE — PROGRESS NOTES
[facility] OB/GYN  CNM Office Note    Torito Salgado is a 44 y.o. female who presents today for her medical conditions/ complaints as noted below.  Chief Complaint   Patient presents with    Procedure     EMB         HPI  Torito presents today for endometrial biopsy. She desires hysterectomy.     Problems/Complaints today:  1. Enlarged uterus  2. Fibroids  -     Surgical Pathology  3. Abdominal bloating  4. Encounter for surveillance of contraceptive pills  5. Pre-procedure lab exam  -     POCT urine pregnancy       Patient Active Problem List   Diagnosis    Hypertension    Sleep apnea    Schizophrenia (HCC)    Herpes    Depression       Patient's last menstrual period was 2025 (exact date).      Past Medical History:   Diagnosis Date    Anxiety     Bipolar disorder (HCC)     Chronic back pain     Depression     Headache     Herpes     thinks it is type 1    High cholesterol     Hypertension     Schizophrenia (HCC)     Sleep apnea      Past Surgical History:   Procedure Laterality Date    COLONOSCOPY N/A 2024    Dr Pina, int hem Gr 1, 10 year recall    COSMETIC SURGERY      NOSE SURGERY       Family History   Problem Relation Age of Onset    Allergy (Severe) Mother     Anemia Mother     High Blood Pressure Mother     High Cholesterol Mother     Ovarian Cancer Maternal Grandmother         age unknown    Cancer Maternal Grandmother     Mental Illness Maternal Grandmother     Breast Cancer Paternal Aunt         age unknown    Alcohol Abuse Father     Depression Father     Breast Cancer Paternal Aunt      Social History     Tobacco Use    Smoking status: Never    Smokeless tobacco: Never   Substance Use Topics    Alcohol use: Never       Current Outpatient Medications   Medication Sig Dispense Refill    HYDROcodone-acetaminophen (NORCO) 5-325 MG per tablet Take 1 tablet by mouth every 6 hours as needed for Pain for up to 2 doses. Take 1 tablet by mouth prior to and after procedure

## 2025-05-28 ENCOUNTER — RESULTS FOLLOW-UP (OUTPATIENT)
Dept: OBGYN CLINIC | Age: 45
End: 2025-05-28

## 2025-06-05 DIAGNOSIS — I10 HYPERTENSION, UNSPECIFIED TYPE: ICD-10-CM

## 2025-06-05 RX ORDER — LISINOPRIL 10 MG/1
10 TABLET ORAL DAILY
Qty: 90 TABLET | Refills: 1 | Status: SHIPPED | OUTPATIENT
Start: 2025-06-05

## 2025-06-12 ENCOUNTER — TELEPHONE (OUTPATIENT)
Dept: OBGYN CLINIC | Age: 45
End: 2025-06-12

## 2025-06-12 ENCOUNTER — PREP FOR PROCEDURE (OUTPATIENT)
Dept: OBGYN CLINIC | Age: 45
End: 2025-06-12

## 2025-06-12 ENCOUNTER — OFFICE VISIT (OUTPATIENT)
Dept: OBGYN CLINIC | Age: 45
End: 2025-06-12
Payer: MEDICARE

## 2025-06-12 VITALS
WEIGHT: 167 LBS | HEIGHT: 62 IN | SYSTOLIC BLOOD PRESSURE: 138 MMHG | DIASTOLIC BLOOD PRESSURE: 89 MMHG | BODY MASS INDEX: 30.73 KG/M2 | HEART RATE: 90 BPM

## 2025-06-12 DIAGNOSIS — R14.0 ABDOMINAL DISTENTION: ICD-10-CM

## 2025-06-12 DIAGNOSIS — D21.9 FIBROIDS: ICD-10-CM

## 2025-06-12 DIAGNOSIS — R10.9 STOMACH DISCOMFORT: ICD-10-CM

## 2025-06-12 DIAGNOSIS — N85.2 ENLARGED UTERUS: ICD-10-CM

## 2025-06-12 DIAGNOSIS — Z71.89 SURGICAL COUNSELING VISIT: Primary | ICD-10-CM

## 2025-06-12 DIAGNOSIS — N85.2 HYPERTROPHY OF UTERUS: ICD-10-CM

## 2025-06-12 PROBLEM — D25.9 LEIOMYOMA OF UTERUS, UNSPECIFIED: Status: ACTIVE | Noted: 2025-06-12

## 2025-06-12 PROCEDURE — 3079F DIAST BP 80-89 MM HG: CPT | Performed by: OBSTETRICS & GYNECOLOGY

## 2025-06-12 PROCEDURE — 99214 OFFICE O/P EST MOD 30 MIN: CPT | Performed by: OBSTETRICS & GYNECOLOGY

## 2025-06-12 PROCEDURE — G8417 CALC BMI ABV UP PARAM F/U: HCPCS | Performed by: OBSTETRICS & GYNECOLOGY

## 2025-06-12 PROCEDURE — 3075F SYST BP GE 130 - 139MM HG: CPT | Performed by: OBSTETRICS & GYNECOLOGY

## 2025-06-12 PROCEDURE — 1036F TOBACCO NON-USER: CPT | Performed by: OBSTETRICS & GYNECOLOGY

## 2025-06-12 PROCEDURE — G8427 DOCREV CUR MEDS BY ELIG CLIN: HCPCS | Performed by: OBSTETRICS & GYNECOLOGY

## 2025-06-12 NOTE — PROGRESS NOTES
Management options discussed. Risks, benefits and side effects reviewed. Patient has previously been on medication and desires definitive therapy.    Risks of TLH including bleeding, infection, injury to bowel/bladder/ureters and need for future surgery. Patient states understanding. All questions answered. Post & pre-op instruction packet given.    Medications reviewed.    TLH/BLS procedure scheduled for 7/8/25 pending insurance authorization.   RTO in 2 wks for post op visit.           
Multiple Vitamin (MULTI VITAMIN PO) Take by mouth      QUEtiapine (SEROQUEL) 200 MG tablet Take 1 tablet by mouth at bedtime      diphenhydrAMINE (BENADRYL) 25 MG capsule Take 1 capsule by mouth every 6 hours as needed      acetaminophen (TYLENOL) 500 MG tablet Take 1 tablet by mouth every 6 hours as needed       No current facility-administered medications for this visit.      Allergies   Allergen Reactions    Cat Dander Anaphylaxis, Anxiety, Dizziness or Vertigo, Photosensitivity, Shortness Of Breath and Swelling         Objective   Blood pressure 138/89, pulse 90, height 1.575 m (5' 2\"), weight 75.8 kg (167 lb), last menstrual period 06/11/2025.  Physical Exam  Constitutional:       General: She is not in acute distress.     Appearance: Normal appearance. She is not ill-appearing or diaphoretic.   HENT:      Head: Normocephalic and atraumatic.      Nose: Nose normal. No rhinorrhea.   Eyes:      General: No scleral icterus.        Right eye: No discharge.         Left eye: No discharge.      Extraocular Movements: Extraocular movements intact.   Pulmonary:      Effort: Pulmonary effort is normal. No respiratory distress.   Musculoskeletal:         General: Normal range of motion.   Skin:     Coloration: Skin is not pale.      Findings: No erythema or rash.   Neurological:      Mental Status: She is alert and oriented to person, place, and time.   Psychiatric:         Attention and Perception: Attention and perception normal.         Mood and Affect: Mood and affect normal.         Speech: Speech normal.         Behavior: Behavior normal. Behavior is cooperative.         Thought Content: Thought content normal.         Cognition and Memory: Cognition and memory normal.         Judgment: Judgment normal.         Physical Exam         Results  Imaging   - Ultrasound of the ovaries: Right ovary is 4.5 x 2.65, left ovary appears normal.    Lab Results   Component Value Date    WBC 5.5 04/21/2025    HGB 15.0 04/21/2025

## 2025-06-13 RX ORDER — PHENAZOPYRIDINE HYDROCHLORIDE 100 MG/1
200 TABLET, FILM COATED ORAL ONCE
Status: CANCELLED | OUTPATIENT
Start: 2025-06-13 | End: 2025-06-13

## 2025-06-13 RX ORDER — ACETAMINOPHEN 325 MG/1
1000 TABLET ORAL ONCE
Status: CANCELLED | OUTPATIENT
Start: 2025-06-13 | End: 2025-06-13

## 2025-06-13 RX ORDER — CELECOXIB 200 MG/1
400 CAPSULE ORAL ONCE
Status: CANCELLED | OUTPATIENT
Start: 2025-06-13 | End: 2025-06-13

## 2025-06-13 RX ORDER — SODIUM CHLORIDE 0.9 % (FLUSH) 0.9 %
5-40 SYRINGE (ML) INJECTION EVERY 12 HOURS SCHEDULED
Status: CANCELLED | OUTPATIENT
Start: 2025-06-13

## 2025-06-13 RX ORDER — SODIUM CHLORIDE 9 MG/ML
INJECTION, SOLUTION INTRAVENOUS PRN
Status: CANCELLED | OUTPATIENT
Start: 2025-06-13

## 2025-06-13 RX ORDER — SODIUM CHLORIDE 0.9 % (FLUSH) 0.9 %
5-40 SYRINGE (ML) INJECTION PRN
Status: CANCELLED | OUTPATIENT
Start: 2025-06-13

## 2025-06-13 RX ORDER — SODIUM CHLORIDE, SODIUM LACTATE, POTASSIUM CHLORIDE, CALCIUM CHLORIDE 600; 310; 30; 20 MG/100ML; MG/100ML; MG/100ML; MG/100ML
INJECTION, SOLUTION INTRAVENOUS CONTINUOUS
Status: CANCELLED | OUTPATIENT
Start: 2025-06-13

## 2025-06-13 ASSESSMENT — ENCOUNTER SYMPTOMS
GASTROINTESTINAL NEGATIVE: 1
EYES NEGATIVE: 1
RESPIRATORY NEGATIVE: 1
ALLERGIC/IMMUNOLOGIC NEGATIVE: 1

## 2025-06-23 ENCOUNTER — HOSPITAL ENCOUNTER (OUTPATIENT)
Dept: PREADMISSION TESTING | Age: 45
Discharge: HOME | End: 2025-06-27
Payer: MEDICARE

## 2025-06-23 VITALS — WEIGHT: 165 LBS | BODY MASS INDEX: 30.18 KG/M2

## 2025-06-23 LAB
ABO/RH: NORMAL
ANTIBODY SCREEN: NORMAL
EKG P AXIS: 52 DEGREES
EKG P-R INTERVAL: 168 MS
EKG Q-T INTERVAL: 358 MS
EKG QRS DURATION: 70 MS
EKG QTC CALCULATION (BAZETT): 403 MS
EKG T AXIS: 26 DEGREES

## 2025-06-23 PROCEDURE — 86901 BLOOD TYPING SEROLOGIC RH(D): CPT

## 2025-06-23 PROCEDURE — 93005 ELECTROCARDIOGRAM TRACING: CPT | Performed by: ANESTHESIOLOGY

## 2025-06-23 PROCEDURE — 86900 BLOOD TYPING SEROLOGIC ABO: CPT

## 2025-06-23 PROCEDURE — 86850 RBC ANTIBODY SCREEN: CPT

## 2025-06-30 ENCOUNTER — OFFICE VISIT (OUTPATIENT)
Age: 45
End: 2025-06-30

## 2025-06-30 VITALS
HEART RATE: 100 BPM | WEIGHT: 165.8 LBS | TEMPERATURE: 97.2 F | HEIGHT: 62 IN | BODY MASS INDEX: 30.51 KG/M2 | RESPIRATION RATE: 22 BRPM | OXYGEN SATURATION: 100 % | DIASTOLIC BLOOD PRESSURE: 112 MMHG | SYSTOLIC BLOOD PRESSURE: 160 MMHG

## 2025-06-30 DIAGNOSIS — J02.9 SORE THROAT: Primary | ICD-10-CM

## 2025-06-30 DIAGNOSIS — R03.0 ELEVATED BLOOD PRESSURE READING: ICD-10-CM

## 2025-06-30 DIAGNOSIS — H65.93 MIDDLE EAR EFFUSION, BILATERAL: ICD-10-CM

## 2025-06-30 LAB — S PYO AG THROAT QL: NORMAL

## 2025-06-30 RX ORDER — FLUTICASONE PROPIONATE 50 MCG
2 SPRAY, SUSPENSION (ML) NASAL DAILY
Qty: 16 G | Refills: 0 | Status: SHIPPED | OUTPATIENT
Start: 2025-06-30

## 2025-06-30 RX ORDER — LORATADINE PSEUDOEPHEDRINE SULFATE 10; 240 MG/1; MG/1
1 TABLET, EXTENDED RELEASE ORAL DAILY
Qty: 10 TABLET | Refills: 0 | Status: SHIPPED | OUTPATIENT
Start: 2025-06-30 | End: 2025-07-10

## 2025-06-30 RX ORDER — METHYLPREDNISOLONE 4 MG/1
TABLET ORAL
Qty: 1 KIT | Refills: 0 | Status: SHIPPED | OUTPATIENT
Start: 2025-06-30 | End: 2025-07-06

## 2025-06-30 ASSESSMENT — ENCOUNTER SYMPTOMS
TROUBLE SWALLOWING: 0
DIARRHEA: 0
EYE ITCHING: 0
CONSTIPATION: 0
ABDOMINAL PAIN: 0
WHEEZING: 0
NAUSEA: 0
RHINORRHEA: 0
COUGH: 0
EYE PAIN: 0
EYE DISCHARGE: 0
SHORTNESS OF BREATH: 0
SINUS PAIN: 0
COLOR CHANGE: 0
ABDOMINAL DISTENTION: 0
CHEST TIGHTNESS: 0
APNEA: 0
VOMITING: 0
SORE THROAT: 1
SINUS PRESSURE: 0

## 2025-06-30 NOTE — PROGRESS NOTES
[facility] URGENT CARE, [facility] ([place])  [facility] URGENT CARE  [address]  [facility] [address]  Dept: [phone]  Dept Fax: [phone]    Torito Salgado is a 44 y.o. female who presents today for her medical conditions/complaints as noted below.  Torito Salgado is c/o of Ear Pain (R ear pain x1.5 weeks. Pt states she has been taking cold and flu medications with no relief. Pt reports pain has now gone to her throat. ) and Pharyngitis (Sore throat x4-5 days.  )        HPI:     Torito Salgado presents with complaints of sore throat and bilateral ear pain (worse on the right). Patient states her ear pain started 1.5 weeks ago and then her sore throat started 4-5 days ago. She has tried cold/flu OTC medications and they are giving her mild relief, but not enough. She denies any other symptoms, including fever or chills. No known sick contacts. She is stable.     Denies any recent antibiotic or steroid administration.      Past Medical History:   Diagnosis Date    Anxiety     Bipolar disorder (HCC)     Chronic back pain     Depression     Headache     Herpes     thinks it is type 1    High cholesterol     Hypertension     Schizophrenia (HCC)     Sleep apnea     no cpap     Past Surgical History:   Procedure Laterality Date    COLONOSCOPY N/A 05/24/2024    Dr Pina, int hem Gr 1, 10 year recall    COSMETIC SURGERY      nose       Family History   Problem Relation Age of Onset    Allergy (Severe) Mother     Anemia Mother     High Blood Pressure Mother     High Cholesterol Mother     Alcohol Abuse Father     Depression Father     Breast Cancer Paternal Aunt         age unknown    Breast Cancer Paternal Aunt     Ovarian Cancer Maternal Grandmother         age unknown    Cancer Maternal Grandmother     Mental Illness Maternal Grandmother        Social History     Tobacco Use    Smoking status: Never    Smokeless tobacco: Never   Substance Use Topics    Alcohol use: Never      Current Outpatient

## 2025-06-30 NOTE — PATIENT INSTRUCTIONS
Mid Ear Effusion    - Claritin D and Flonase sent to pharmacy; take as prescribed.  - Medrol Dose Pack sent to pharmacy; take as prescribed. Risks/benefits discussed with patient, patient gave informed consent.  - Antibiotics are not need at this time because the fluid is not infected.   - Encouraged warm compresses.   - Alternate tylenol/motrin as needed for pain.  - Return to clinic if pain begins to increase or if you start noticing drainage from the ear.     Pharyngitis    - Strep swab was negative.  - Gargle with warm salt water several times a day to help reduce swelling and relieve pain.   - Recommend throat lozenges, chloraseptic sprays, or honey to help sore throat.  - Popsicles and ice cream can soothe the throat.   - Tylenol/Ibuprofen as needed for fever.   - Increase fluids and rest.   - Place a cool mist humidifier next to bedside.   - Return to the clinic or follow up with PCP if symptoms worsen or fail to improve.      Elevated Blood Pressure Reading    - BP readings are considered elevated (greater than 130/80) during clinic visit.  - First BP Reading: 160/122  - Second BP Reading: 160/112  - Patient currently has a diagnosis of chronic hypertension and takes lisinopril daily.    - Initiate exercise (30 minute periods 3-4 times per week).  - Start DASH diet: vegetables, fruits, low-fat dairy, whole grains, poultry/fish.  - Monitor intake of salt, sweet, sugar-sweetened beverages and red meats.  - Limit intake of alcohol (men: no more than 2 drinks, women: no more than 1 drink daily).  - Keep a log by checking BP morning and night, take with you to f/u appointment.   - Make appointment with PCP for chronic maintenance and treatment of BP.

## 2025-07-07 ENCOUNTER — TELEPHONE (OUTPATIENT)
Dept: OBGYN CLINIC | Age: 45
End: 2025-07-07

## 2025-07-08 ENCOUNTER — HOSPITAL ENCOUNTER (INPATIENT)
Age: 45
LOS: 2 days | Discharge: HOME | DRG: 742 | End: 2025-07-10
Attending: OBSTETRICS & GYNECOLOGY | Admitting: OBSTETRICS & GYNECOLOGY
Payer: MEDICARE

## 2025-07-08 ENCOUNTER — ANESTHESIA (OUTPATIENT)
Dept: OPERATING ROOM | Age: 45
DRG: 742 | End: 2025-07-08
Payer: MEDICARE

## 2025-07-08 ENCOUNTER — ANESTHESIA EVENT (OUTPATIENT)
Dept: OPERATING ROOM | Age: 45
DRG: 742 | End: 2025-07-08
Payer: MEDICARE

## 2025-07-08 DIAGNOSIS — R14.0 ABDOMINAL DISTENTION: ICD-10-CM

## 2025-07-08 DIAGNOSIS — N99.81 INTRAOPERATIVE BLADDER INJURY: Primary | ICD-10-CM

## 2025-07-08 DIAGNOSIS — D25.9 LEIOMYOMA OF UTERUS, UNSPECIFIED: ICD-10-CM

## 2025-07-08 DIAGNOSIS — N85.2 HYPERTROPHY OF UTERUS: ICD-10-CM

## 2025-07-08 DIAGNOSIS — Z90.710 S/P LAPAROSCOPIC HYSTERECTOMY: ICD-10-CM

## 2025-07-08 DIAGNOSIS — R10.9 STOMACH DISCOMFORT: ICD-10-CM

## 2025-07-08 DIAGNOSIS — B00.9 HERPES: ICD-10-CM

## 2025-07-08 PROBLEM — N83.202 LEFT OVARIAN CYST: Status: ACTIVE | Noted: 2025-07-08

## 2025-07-08 LAB
ABO/RH: NORMAL
ANTIBODY SCREEN: NORMAL
HCG, URINE, POC: NEGATIVE
Lab: NORMAL
NEGATIVE QC PASS/FAIL: NORMAL
POSITIVE QC PASS/FAIL: NORMAL

## 2025-07-08 PROCEDURE — 7100000001 HC PACU RECOVERY - ADDTL 15 MIN: Performed by: OBSTETRICS & GYNECOLOGY

## 2025-07-08 PROCEDURE — 0TQB4ZZ REPAIR BLADDER, PERCUTANEOUS ENDOSCOPIC APPROACH: ICD-10-PCS | Performed by: OBSTETRICS & GYNECOLOGY

## 2025-07-08 PROCEDURE — 99222 1ST HOSP IP/OBS MODERATE 55: CPT | Performed by: UROLOGY

## 2025-07-08 PROCEDURE — 88305 TISSUE EXAM BY PATHOLOGIST: CPT

## 2025-07-08 PROCEDURE — 0UB14ZZ EXCISION OF LEFT OVARY, PERCUTANEOUS ENDOSCOPIC APPROACH: ICD-10-PCS | Performed by: OBSTETRICS & GYNECOLOGY

## 2025-07-08 PROCEDURE — 0UT74ZZ RESECTION OF BILATERAL FALLOPIAN TUBES, PERCUTANEOUS ENDOSCOPIC APPROACH: ICD-10-PCS | Performed by: OBSTETRICS & GYNECOLOGY

## 2025-07-08 PROCEDURE — 86901 BLOOD TYPING SEROLOGIC RH(D): CPT

## 2025-07-08 PROCEDURE — 86900 BLOOD TYPING SEROLOGIC ABO: CPT

## 2025-07-08 PROCEDURE — 1200000000 HC SEMI PRIVATE

## 2025-07-08 PROCEDURE — 6360000002 HC RX W HCPCS: Performed by: OBSTETRICS & GYNECOLOGY

## 2025-07-08 PROCEDURE — 6370000000 HC RX 637 (ALT 250 FOR IP): Performed by: OBSTETRICS & GYNECOLOGY

## 2025-07-08 PROCEDURE — 3700000001 HC ADD 15 MINUTES (ANESTHESIA): Performed by: OBSTETRICS & GYNECOLOGY

## 2025-07-08 PROCEDURE — 0UT94ZZ RESECTION OF UTERUS, PERCUTANEOUS ENDOSCOPIC APPROACH: ICD-10-PCS | Performed by: OBSTETRICS & GYNECOLOGY

## 2025-07-08 PROCEDURE — 2500000003 HC RX 250 WO HCPCS: Performed by: OBSTETRICS & GYNECOLOGY

## 2025-07-08 PROCEDURE — 2709999900 HC NON-CHARGEABLE SUPPLY: Performed by: OBSTETRICS & GYNECOLOGY

## 2025-07-08 PROCEDURE — 2580000003 HC RX 258: Performed by: OBSTETRICS & GYNECOLOGY

## 2025-07-08 PROCEDURE — 2500000003 HC RX 250 WO HCPCS: Performed by: ANESTHESIOLOGY

## 2025-07-08 PROCEDURE — 3700000000 HC ANESTHESIA ATTENDED CARE: Performed by: OBSTETRICS & GYNECOLOGY

## 2025-07-08 PROCEDURE — 88304 TISSUE EXAM BY PATHOLOGIST: CPT

## 2025-07-08 PROCEDURE — 36415 COLL VENOUS BLD VENIPUNCTURE: CPT

## 2025-07-08 PROCEDURE — 7100000000 HC PACU RECOVERY - FIRST 15 MIN: Performed by: OBSTETRICS & GYNECOLOGY

## 2025-07-08 PROCEDURE — 6360000002 HC RX W HCPCS: Performed by: ANESTHESIOLOGY

## 2025-07-08 PROCEDURE — 2720000010 HC SURG SUPPLY STERILE: Performed by: OBSTETRICS & GYNECOLOGY

## 2025-07-08 PROCEDURE — 86850 RBC ANTIBODY SCREEN: CPT

## 2025-07-08 PROCEDURE — S2900 ROBOTIC SURGICAL SYSTEM: HCPCS | Performed by: OBSTETRICS & GYNECOLOGY

## 2025-07-08 PROCEDURE — 2500000003 HC RX 250 WO HCPCS

## 2025-07-08 PROCEDURE — 3600000019 HC SURGERY ROBOT ADDTL 15MIN: Performed by: OBSTETRICS & GYNECOLOGY

## 2025-07-08 PROCEDURE — 6360000002 HC RX W HCPCS

## 2025-07-08 PROCEDURE — 88307 TISSUE EXAM BY PATHOLOGIST: CPT

## 2025-07-08 PROCEDURE — 3600000009 HC SURGERY ROBOT BASE: Performed by: OBSTETRICS & GYNECOLOGY

## 2025-07-08 RX ORDER — SODIUM CHLORIDE, SODIUM LACTATE, POTASSIUM CHLORIDE, CALCIUM CHLORIDE 600; 310; 30; 20 MG/100ML; MG/100ML; MG/100ML; MG/100ML
INJECTION, SOLUTION INTRAVENOUS CONTINUOUS
Status: DISCONTINUED | OUTPATIENT
Start: 2025-07-08 | End: 2025-07-08 | Stop reason: HOSPADM

## 2025-07-08 RX ORDER — SODIUM CHLORIDE 9 MG/ML
INJECTION, SOLUTION INTRAVENOUS PRN
Status: DISCONTINUED | OUTPATIENT
Start: 2025-07-08 | End: 2025-07-10 | Stop reason: HOSPADM

## 2025-07-08 RX ORDER — SODIUM CHLORIDE 0.9 % (FLUSH) 0.9 %
5-40 SYRINGE (ML) INJECTION PRN
Status: DISCONTINUED | OUTPATIENT
Start: 2025-07-08 | End: 2025-07-08 | Stop reason: HOSPADM

## 2025-07-08 RX ORDER — TROSPIUM CHLORIDE 20 MG/1
20 TABLET, FILM COATED ORAL 2 TIMES DAILY PRN
Status: DISCONTINUED | OUTPATIENT
Start: 2025-07-08 | End: 2025-07-10 | Stop reason: HOSPADM

## 2025-07-08 RX ORDER — BUPIVACAINE HCL/EPINEPHRINE 0.25-.0005
VIAL (ML) INJECTION PRN
Status: DISCONTINUED | OUTPATIENT
Start: 2025-07-08 | End: 2025-07-08 | Stop reason: ALTCHOICE

## 2025-07-08 RX ORDER — QUETIAPINE FUMARATE 50 MG/1
200 TABLET, FILM COATED ORAL NIGHTLY
Status: DISCONTINUED | OUTPATIENT
Start: 2025-07-08 | End: 2025-07-10 | Stop reason: HOSPADM

## 2025-07-08 RX ORDER — DEXAMETHASONE SODIUM PHOSPHATE 4 MG/ML
4 INJECTION, SOLUTION INTRA-ARTICULAR; INTRALESIONAL; INTRAMUSCULAR; INTRAVENOUS; SOFT TISSUE ONCE
Status: COMPLETED | OUTPATIENT
Start: 2025-07-08 | End: 2025-07-08

## 2025-07-08 RX ORDER — LISINOPRIL 10 MG/1
10 TABLET ORAL DAILY
Status: DISCONTINUED | OUTPATIENT
Start: 2025-07-08 | End: 2025-07-10 | Stop reason: HOSPADM

## 2025-07-08 RX ORDER — HYDROMORPHONE HYDROCHLORIDE 1 MG/ML
1 INJECTION, SOLUTION INTRAMUSCULAR; INTRAVENOUS; SUBCUTANEOUS
Status: DISCONTINUED | OUTPATIENT
Start: 2025-07-08 | End: 2025-07-10 | Stop reason: HOSPADM

## 2025-07-08 RX ORDER — DOCUSATE SODIUM 100 MG/1
100 CAPSULE, LIQUID FILLED ORAL 2 TIMES DAILY
Status: DISCONTINUED | OUTPATIENT
Start: 2025-07-08 | End: 2025-07-10 | Stop reason: HOSPADM

## 2025-07-08 RX ORDER — ROCURONIUM BROMIDE 10 MG/ML
INJECTION, SOLUTION INTRAVENOUS
Status: DISCONTINUED | OUTPATIENT
Start: 2025-07-08 | End: 2025-07-08 | Stop reason: SDUPTHER

## 2025-07-08 RX ORDER — SODIUM CHLORIDE 0.9 % (FLUSH) 0.9 %
5-40 SYRINGE (ML) INJECTION EVERY 12 HOURS SCHEDULED
Status: DISCONTINUED | OUTPATIENT
Start: 2025-07-08 | End: 2025-07-08 | Stop reason: HOSPADM

## 2025-07-08 RX ORDER — ONDANSETRON 2 MG/ML
INJECTION INTRAMUSCULAR; INTRAVENOUS
Status: DISCONTINUED | OUTPATIENT
Start: 2025-07-08 | End: 2025-07-08 | Stop reason: SDUPTHER

## 2025-07-08 RX ORDER — ONDANSETRON 2 MG/ML
4 INJECTION INTRAMUSCULAR; INTRAVENOUS EVERY 6 HOURS PRN
Status: DISCONTINUED | OUTPATIENT
Start: 2025-07-08 | End: 2025-07-08 | Stop reason: SDUPTHER

## 2025-07-08 RX ORDER — VERAPAMIL HYDROCHLORIDE 180 MG/1
180 TABLET, FILM COATED, EXTENDED RELEASE ORAL DAILY
Status: DISCONTINUED | OUTPATIENT
Start: 2025-07-08 | End: 2025-07-10 | Stop reason: HOSPADM

## 2025-07-08 RX ORDER — ACETAMINOPHEN 500 MG
1000 TABLET ORAL EVERY 8 HOURS SCHEDULED
Status: DISCONTINUED | OUTPATIENT
Start: 2025-07-08 | End: 2025-07-10 | Stop reason: HOSPADM

## 2025-07-08 RX ORDER — SODIUM CHLORIDE 0.9 % (FLUSH) 0.9 %
5-40 SYRINGE (ML) INJECTION EVERY 12 HOURS SCHEDULED
Status: DISCONTINUED | OUTPATIENT
Start: 2025-07-08 | End: 2025-07-10 | Stop reason: HOSPADM

## 2025-07-08 RX ORDER — HYOSCYAMINE SULFATE 0.12 MG/1
0.12 TABLET SUBLINGUAL EVERY 4 HOURS PRN
Status: DISCONTINUED | OUTPATIENT
Start: 2025-07-08 | End: 2025-07-10 | Stop reason: HOSPADM

## 2025-07-08 RX ORDER — PROPOFOL 10 MG/ML
INJECTION, EMULSION INTRAVENOUS
Status: DISCONTINUED | OUTPATIENT
Start: 2025-07-08 | End: 2025-07-08 | Stop reason: SDUPTHER

## 2025-07-08 RX ORDER — OXYCODONE HYDROCHLORIDE 10 MG/1
10 TABLET ORAL EVERY 4 HOURS PRN
Status: DISCONTINUED | OUTPATIENT
Start: 2025-07-08 | End: 2025-07-10 | Stop reason: HOSPADM

## 2025-07-08 RX ORDER — ONDANSETRON 2 MG/ML
4 INJECTION INTRAMUSCULAR; INTRAVENOUS EVERY 6 HOURS PRN
Status: DISCONTINUED | OUTPATIENT
Start: 2025-07-08 | End: 2025-07-10 | Stop reason: HOSPADM

## 2025-07-08 RX ORDER — TROSPIUM CHLORIDE 20 MG/1
20 TABLET, FILM COATED ORAL
Status: DISCONTINUED | OUTPATIENT
Start: 2025-07-09 | End: 2025-07-09

## 2025-07-08 RX ORDER — ONDANSETRON 4 MG/1
4 TABLET, ORALLY DISINTEGRATING ORAL EVERY 8 HOURS PRN
Status: DISCONTINUED | OUTPATIENT
Start: 2025-07-08 | End: 2025-07-08 | Stop reason: SDUPTHER

## 2025-07-08 RX ORDER — SODIUM CHLORIDE 0.9 % (FLUSH) 0.9 %
5-40 SYRINGE (ML) INJECTION PRN
Status: DISCONTINUED | OUTPATIENT
Start: 2025-07-08 | End: 2025-07-10 | Stop reason: HOSPADM

## 2025-07-08 RX ORDER — FLUTICASONE PROPIONATE 50 MCG
2 SPRAY, SUSPENSION (ML) NASAL DAILY PRN
Status: DISCONTINUED | OUTPATIENT
Start: 2025-07-08 | End: 2025-07-10 | Stop reason: HOSPADM

## 2025-07-08 RX ORDER — IBUPROFEN 400 MG/1
800 TABLET, FILM COATED ORAL EVERY 8 HOURS
Status: DISCONTINUED | OUTPATIENT
Start: 2025-07-09 | End: 2025-07-10 | Stop reason: HOSPADM

## 2025-07-08 RX ORDER — APREPITANT 40 MG/1
40 CAPSULE ORAL ONCE
Status: COMPLETED | OUTPATIENT
Start: 2025-07-08 | End: 2025-07-08

## 2025-07-08 RX ORDER — EPINEPHRINE 0.3 MG/.3ML
0.3 INJECTION SUBCUTANEOUS PRN
Status: DISCONTINUED | OUTPATIENT
Start: 2025-07-08 | End: 2025-07-10 | Stop reason: HOSPADM

## 2025-07-08 RX ORDER — ONDANSETRON 2 MG/ML
4 INJECTION INTRAMUSCULAR; INTRAVENOUS
Status: DISCONTINUED | OUTPATIENT
Start: 2025-07-08 | End: 2025-07-08 | Stop reason: HOSPADM

## 2025-07-08 RX ORDER — PHENAZOPYRIDINE HYDROCHLORIDE 100 MG/1
200 TABLET, FILM COATED ORAL ONCE
Status: COMPLETED | OUTPATIENT
Start: 2025-07-08 | End: 2025-07-08

## 2025-07-08 RX ORDER — HYDROMORPHONE HYDROCHLORIDE 1 MG/ML
0.25 INJECTION, SOLUTION INTRAMUSCULAR; INTRAVENOUS; SUBCUTANEOUS EVERY 5 MIN PRN
Status: DISCONTINUED | OUTPATIENT
Start: 2025-07-08 | End: 2025-07-08 | Stop reason: HOSPADM

## 2025-07-08 RX ORDER — CELECOXIB 200 MG/1
400 CAPSULE ORAL ONCE
Status: COMPLETED | OUTPATIENT
Start: 2025-07-08 | End: 2025-07-08

## 2025-07-08 RX ORDER — MIDAZOLAM HYDROCHLORIDE 1 MG/ML
INJECTION, SOLUTION INTRAMUSCULAR; INTRAVENOUS
Status: DISCONTINUED | OUTPATIENT
Start: 2025-07-08 | End: 2025-07-08 | Stop reason: SDUPTHER

## 2025-07-08 RX ORDER — ONDANSETRON 4 MG/1
4 TABLET, ORALLY DISINTEGRATING ORAL EVERY 8 HOURS PRN
Status: DISCONTINUED | OUTPATIENT
Start: 2025-07-08 | End: 2025-07-10 | Stop reason: HOSPADM

## 2025-07-08 RX ORDER — FENTANYL CITRATE 50 UG/ML
INJECTION, SOLUTION INTRAMUSCULAR; INTRAVENOUS
Status: DISCONTINUED | OUTPATIENT
Start: 2025-07-08 | End: 2025-07-08 | Stop reason: SDUPTHER

## 2025-07-08 RX ORDER — SODIUM CHLORIDE 9 MG/ML
INJECTION, SOLUTION INTRAVENOUS PRN
Status: DISCONTINUED | OUTPATIENT
Start: 2025-07-08 | End: 2025-07-08 | Stop reason: HOSPADM

## 2025-07-08 RX ORDER — HYDRALAZINE HYDROCHLORIDE 20 MG/ML
10 INJECTION INTRAMUSCULAR; INTRAVENOUS ONCE
Status: COMPLETED | OUTPATIENT
Start: 2025-07-08 | End: 2025-07-08

## 2025-07-08 RX ORDER — KETOROLAC TROMETHAMINE 30 MG/ML
30 INJECTION, SOLUTION INTRAMUSCULAR; INTRAVENOUS EVERY 6 HOURS
Status: COMPLETED | OUTPATIENT
Start: 2025-07-08 | End: 2025-07-09

## 2025-07-08 RX ORDER — HYDROMORPHONE HYDROCHLORIDE 1 MG/ML
0.5 INJECTION, SOLUTION INTRAMUSCULAR; INTRAVENOUS; SUBCUTANEOUS EVERY 5 MIN PRN
Status: DISCONTINUED | OUTPATIENT
Start: 2025-07-08 | End: 2025-07-08 | Stop reason: HOSPADM

## 2025-07-08 RX ORDER — HYDROMORPHONE HYDROCHLORIDE 1 MG/ML
0.5 INJECTION, SOLUTION INTRAMUSCULAR; INTRAVENOUS; SUBCUTANEOUS
Status: DISCONTINUED | OUTPATIENT
Start: 2025-07-08 | End: 2025-07-10 | Stop reason: HOSPADM

## 2025-07-08 RX ORDER — ACETAMINOPHEN 500 MG
1000 TABLET ORAL ONCE
Status: COMPLETED | OUTPATIENT
Start: 2025-07-08 | End: 2025-07-08

## 2025-07-08 RX ORDER — LIDOCAINE HYDROCHLORIDE 10 MG/ML
INJECTION, SOLUTION EPIDURAL; INFILTRATION; INTRACAUDAL; PERINEURAL
Status: DISCONTINUED | OUTPATIENT
Start: 2025-07-08 | End: 2025-07-08 | Stop reason: SDUPTHER

## 2025-07-08 RX ORDER — LABETALOL HYDROCHLORIDE 5 MG/ML
5 INJECTION, SOLUTION INTRAVENOUS ONCE
Status: COMPLETED | OUTPATIENT
Start: 2025-07-08 | End: 2025-07-08

## 2025-07-08 RX ORDER — OXYCODONE HYDROCHLORIDE 5 MG/1
5 TABLET ORAL EVERY 4 HOURS PRN
Status: DISCONTINUED | OUTPATIENT
Start: 2025-07-08 | End: 2025-07-10 | Stop reason: HOSPADM

## 2025-07-08 RX ORDER — SODIUM CHLORIDE, SODIUM LACTATE, POTASSIUM CHLORIDE, CALCIUM CHLORIDE 600; 310; 30; 20 MG/100ML; MG/100ML; MG/100ML; MG/100ML
INJECTION, SOLUTION INTRAVENOUS CONTINUOUS
Status: DISCONTINUED | OUTPATIENT
Start: 2025-07-08 | End: 2025-07-10 | Stop reason: HOSPADM

## 2025-07-08 RX ADMIN — SUGAMMADEX 400 MG: 100 INJECTION, SOLUTION INTRAVENOUS at 12:16

## 2025-07-08 RX ADMIN — ACETAMINOPHEN 1000 MG: 500 TABLET ORAL at 21:24

## 2025-07-08 RX ADMIN — ONDANSETRON 8 MG: 2 INJECTION INTRAMUSCULAR; INTRAVENOUS at 12:01

## 2025-07-08 RX ADMIN — SODIUM CHLORIDE, SODIUM LACTATE, POTASSIUM CHLORIDE, AND CALCIUM CHLORIDE: .6; .31; .03; .02 INJECTION, SOLUTION INTRAVENOUS at 16:36

## 2025-07-08 RX ADMIN — KETOROLAC TROMETHAMINE 30 MG: 30 INJECTION, SOLUTION INTRAMUSCULAR at 16:34

## 2025-07-08 RX ADMIN — WATER 2000 MG: 1 INJECTION INTRAMUSCULAR; INTRAVENOUS; SUBCUTANEOUS at 23:29

## 2025-07-08 RX ADMIN — SODIUM CHLORIDE, SODIUM LACTATE, POTASSIUM CHLORIDE, AND CALCIUM CHLORIDE: 600; 310; 30; 20 INJECTION, SOLUTION INTRAVENOUS at 10:46

## 2025-07-08 RX ADMIN — ROCURONIUM BROMIDE 40 MG: 10 INJECTION, SOLUTION INTRAVENOUS at 08:11

## 2025-07-08 RX ADMIN — FENTANYL CITRATE 50 MCG: 0.05 INJECTION, SOLUTION INTRAMUSCULAR; INTRAVENOUS at 11:24

## 2025-07-08 RX ADMIN — ACETAMINOPHEN 1000 MG: 500 TABLET ORAL at 07:08

## 2025-07-08 RX ADMIN — ROCURONIUM BROMIDE 30 MG: 10 INJECTION, SOLUTION INTRAVENOUS at 11:34

## 2025-07-08 RX ADMIN — ROCURONIUM BROMIDE 10 MG: 10 INJECTION, SOLUTION INTRAVENOUS at 08:19

## 2025-07-08 RX ADMIN — LISINOPRIL 10 MG: 10 TABLET ORAL at 16:04

## 2025-07-08 RX ADMIN — DEXAMETHASONE SODIUM PHOSPHATE 4 MG: 4 INJECTION INTRA-ARTICULAR; INTRALESIONAL; INTRAMUSCULAR; INTRAVENOUS; SOFT TISSUE at 07:24

## 2025-07-08 RX ADMIN — APREPITANT 40 MG: 40 CAPSULE ORAL at 07:24

## 2025-07-08 RX ADMIN — SODIUM CHLORIDE, PRESERVATIVE FREE 20 MG: 5 INJECTION INTRAVENOUS at 07:24

## 2025-07-08 RX ADMIN — WATER 2000 MG: 1 INJECTION INTRAMUSCULAR; INTRAVENOUS; SUBCUTANEOUS at 16:35

## 2025-07-08 RX ADMIN — FENTANYL CITRATE 50 MCG: 0.05 INJECTION, SOLUTION INTRAMUSCULAR; INTRAVENOUS at 08:39

## 2025-07-08 RX ADMIN — ROCURONIUM BROMIDE 20 MG: 10 INJECTION, SOLUTION INTRAVENOUS at 09:49

## 2025-07-08 RX ADMIN — QUETIAPINE FUMARATE 100 MG: 50 TABLET ORAL at 21:32

## 2025-07-08 RX ADMIN — KETOROLAC TROMETHAMINE 30 MG: 30 INJECTION, SOLUTION INTRAMUSCULAR at 21:24

## 2025-07-08 RX ADMIN — LIDOCAINE HYDROCHLORIDE 70 MG: 10 INJECTION, SOLUTION EPIDURAL; INFILTRATION; INTRACAUDAL; PERINEURAL at 08:11

## 2025-07-08 RX ADMIN — LABETALOL HYDROCHLORIDE 5 MG: 5 INJECTION, SOLUTION INTRAVENOUS at 13:10

## 2025-07-08 RX ADMIN — CELECOXIB 400 MG: 200 CAPSULE ORAL at 07:08

## 2025-07-08 RX ADMIN — MIDAZOLAM 2 MG: 1 INJECTION INTRAMUSCULAR; INTRAVENOUS at 08:06

## 2025-07-08 RX ADMIN — ROCURONIUM BROMIDE 20 MG: 10 INJECTION, SOLUTION INTRAVENOUS at 08:51

## 2025-07-08 RX ADMIN — ACETAMINOPHEN 1000 MG: 500 TABLET ORAL at 16:34

## 2025-07-08 RX ADMIN — CEFAZOLIN 2000 MG: 1 INJECTION, POWDER, FOR SOLUTION INTRAMUSCULAR; INTRAVENOUS at 08:31

## 2025-07-08 RX ADMIN — FENTANYL CITRATE 50 MCG: 0.05 INJECTION, SOLUTION INTRAMUSCULAR; INTRAVENOUS at 09:13

## 2025-07-08 RX ADMIN — PROPOFOL 150 MG: 10 INJECTION, EMULSION INTRAVENOUS at 08:11

## 2025-07-08 RX ADMIN — VERAPAMIL HYDROCHLORIDE 180 MG: 180 TABLET, FILM COATED, EXTENDED RELEASE ORAL at 16:04

## 2025-07-08 RX ADMIN — PHENAZOPYRIDINE 200 MG: 100 TABLET ORAL at 07:08

## 2025-07-08 RX ADMIN — HYDROMORPHONE HYDROCHLORIDE 1 MG: 1 INJECTION, SOLUTION INTRAMUSCULAR; INTRAVENOUS; SUBCUTANEOUS at 08:31

## 2025-07-08 RX ADMIN — SODIUM CHLORIDE, SODIUM LACTATE, POTASSIUM CHLORIDE, AND CALCIUM CHLORIDE: 600; 310; 30; 20 INJECTION, SOLUTION INTRAVENOUS at 07:10

## 2025-07-08 RX ADMIN — FENTANYL CITRATE 100 MCG: 0.05 INJECTION, SOLUTION INTRAMUSCULAR; INTRAVENOUS at 08:06

## 2025-07-08 RX ADMIN — HYDRALAZINE HYDROCHLORIDE 10 MG: 20 INJECTION INTRAMUSCULAR; INTRAVENOUS at 13:50

## 2025-07-08 SDOH — ECONOMIC STABILITY: INCOME INSECURITY: IN THE PAST 12 MONTHS, HAS THE ELECTRIC, GAS, OIL, OR WATER COMPANY THREATENED TO SHUT OFF SERVICE IN YOUR HOME?: NO

## 2025-07-08 SDOH — ECONOMIC STABILITY: FOOD INSECURITY: WITHIN THE PAST 12 MONTHS, YOU WORRIED THAT YOUR FOOD WOULD RUN OUT BEFORE YOU GOT MONEY TO BUY MORE.: NEVER TRUE

## 2025-07-08 SDOH — ECONOMIC STABILITY: INCOME INSECURITY: HOW HARD IS IT FOR YOU TO PAY FOR THE VERY BASICS LIKE FOOD, HOUSING, MEDICAL CARE, AND HEATING?: NOT HARD AT ALL

## 2025-07-08 ASSESSMENT — PATIENT HEALTH QUESTIONNAIRE - PHQ9
SUM OF ALL RESPONSES TO PHQ QUESTIONS 1-9: 0
1. LITTLE INTEREST OR PLEASURE IN DOING THINGS: NOT AT ALL
2. FEELING DOWN, DEPRESSED OR HOPELESS: NOT AT ALL
SUM OF ALL RESPONSES TO PHQ QUESTIONS 1-9: 0

## 2025-07-08 ASSESSMENT — PAIN DESCRIPTION - DESCRIPTORS
DESCRIPTORS: ACHING
DESCRIPTORS: ACHING;DISCOMFORT

## 2025-07-08 ASSESSMENT — PAIN - FUNCTIONAL ASSESSMENT
PAIN_FUNCTIONAL_ASSESSMENT: NONE - DENIES PAIN
PAIN_FUNCTIONAL_ASSESSMENT: ACTIVITIES ARE NOT PREVENTED

## 2025-07-08 ASSESSMENT — PAIN SCALES - GENERAL
PAINLEVEL_OUTOF10: 7
PAINLEVEL_OUTOF10: 4

## 2025-07-08 ASSESSMENT — LIFESTYLE VARIABLES: SMOKING_STATUS: 0

## 2025-07-08 ASSESSMENT — PAIN DESCRIPTION - LOCATION
LOCATION: ABDOMEN
LOCATION: ABDOMEN

## 2025-07-08 ASSESSMENT — PAIN DESCRIPTION - ORIENTATION
ORIENTATION: MID
ORIENTATION: LOWER

## 2025-07-08 NOTE — ANESTHESIA PRE PROCEDURE
Negative 05/23/2025        ABGs: No results found for: \"PHART\", \"PO2ART\", \"LKF6PDF\", \"MZO8OGD\", \"BEART\", \"Z5QFRBZN\"     Type & Screen (If Applicable):  Lab Results   Component Value Date    ABORH O POS 06/23/2025    LABANTI NEG 06/23/2025       Drug/Infectious Status (If Applicable):  No results found for: \"HIV\", \"HEPCAB\"    COVID-19 Screening (If Applicable): No results found for: \"COVID19\"        Anesthesia Evaluation  Patient summary reviewed   no history of anesthetic complications:   Airway: Mallampati: III  TM distance: >3 FB   Neck ROM: full  Mouth opening: > = 3 FB   Dental:    (+) partials      Pulmonary:normal exam  breath sounds clear to auscultation      (-) asthma, recent URI, sleep apnea (Listed in EMR, but patient denies) and not a current smoker          Patient did not smoke on day of surgery.                 Cardiovascular:  Exercise tolerance: good (>4 METS)  (+) hypertension:    (-) pacemaker, past MI, CABG/stent, dysrhythmias and  angina    ECG reviewed  Rhythm: regular  Rate: normal           Beta Blocker:  Not on Beta Blocker         Neuro/Psych:   (+) headaches:, psychiatric history (Schizophrenia, Depression, Bipolar):   (-) seizures, TIA and CVA           GI/Hepatic/Renal:        (-) GERD, liver disease and no renal disease       Endo/Other:        (-) diabetes mellitus, hypothyroidism, hyperthyroidism               Abdominal:             Vascular:     - DVT.      Other Findings:         Anesthesia Plan      general     ASA 2     (Preop famotidine, dexamethasone, emend)  Induction: intravenous.    MIPS: Postoperative opioids intended and Prophylactic antiemetics administered.  Anesthetic plan and risks discussed with patient.    Use of blood products discussed with patient whom consented to blood products.                  Soto Ortiz MD   7/8/2025

## 2025-07-08 NOTE — ANESTHESIA POSTPROCEDURE EVALUATION
Department of Anesthesiology  Postprocedure Note    Patient: Torito Salgado  MRN: [id number]  YOB: 1980  Date of evaluation: 7/8/2025    Procedure Summary       Date: 07/08/25 Room / Location: [address]    Anesthesia Start: 0806 Anesthesia Stop:     Procedure: ROBOTIC TOTAL LAPAROSCOPIC ABDOMINAL HYSTERECTOMY, BILATERAL SALPINGECTOMY, CYSTOSCOPY, LEFT OVARIAN CYSTECTOMY, CYSTOTOMY REPAIR (Bilateral) Diagnosis:       Leiomyoma of uterus, unspecified      Hypertrophy of uterus      Abdominal distention      Stomach discomfort      (Leiomyoma of uterus, unspecified [D25.9])      (Hypertrophy of uterus [N85.2])      (Abdominal distention [R14.0])      (Stomach discomfort [R10.9])    Surgeons: Kalani Bernard MD Responsible Provider: Soto Herbert APRN - CRNA    Anesthesia Type: General ASA Status: 2            Anesthesia Type: General    Melanie Phase I: Melanie Score: 10    Melanie Phase II:      Anesthesia Post Evaluation    Patient location during evaluation: PACU  Patient participation: complete - patient participated  Level of consciousness: awake and alert  Pain score: 0  Airway patency: patent  Nausea & Vomiting: no vomiting and no nausea  Cardiovascular status: blood pressure returned to baseline and hemodynamically stable  Respiratory status: spontaneous ventilation, room air, nonlabored ventilation and acceptable  Hydration status: euvolemic  Comments: BP (!) 110/93   Pulse 100   Temp 97.7 °F (36.5 °C) (Temporal)   Resp 16   Ht 1.575 m (5' 2\")   Wt 74.8 kg (165 lb)   LMP 07/07/2025 (Exact Date)   SpO2 100%   BMI 30.18 kg/m²     Pain management: adequate    No notable events documented.

## 2025-07-08 NOTE — DISCHARGE INSTRUCTIONS
strain. This may include heavy grocery bags and milk containers, a heavy briefcase or backpack, cat litter or dog food bags, a vacuum , or a child.     Avoid strenuous activities, such as biking, jogging, weight lifting, or aerobic exercise, until your doctor says it is okay.     You may shower. Pat the cut (incision) dry. Do not take a bath for the first 2 weeks, or until your doctor tells you it is okay.     Ask your doctor when you can drive again.     You will probably need to take about 2 weeks off from work. It depends on the type of work you do and how you feel.     Ask your doctor when it is okay for you to have sex.   Diet    You can eat your normal diet. If your stomach is upset, try bland, low-fat foods like plain rice, broiled chicken, toast, and yogurt.     Drink plenty of fluids (unless your doctor tells you not to).     You may notice that your bowel movements are not regular right after your surgery. This is common. Try to avoid constipation and straining with bowel movements. You may want to take a fiber supplement every day. If you have not had a bowel movement after a couple of days, ask your doctor about taking a mild laxative.   Medicines    Your doctor will tell you if and when you can restart your medicines. You will also get instructions about taking any new medicines.     If you stopped taking aspirin or some other blood thinner, your doctor will tell you when to start taking it again.     Be safe with medicines. Take pain medicines exactly as directed.  If the doctor gave you a prescription medicine for pain, take it as prescribed.  If you are not taking a prescription pain medicine, ask your doctor if you can take an over-the-counter medicine.     If you think your pain medicine is making you sick to your stomach:  Take your medicine after meals (unless your doctor has told you not to).  Ask your doctor for a different pain medicine.     If your doctor prescribed antibiotics, take

## 2025-07-08 NOTE — CONSULTS
PROFILE:No results for input(s): \"AST\", \"ALT\", \"BILIDIR\", \"BILITOT\", \"ALKPHOS\" in the last 72 hours.    Imaging/Diagnostics   No results found.    Assessment      Hospital Problems           Last Modified POA    Leiomyoma of uterus, unspecified 6/12/2025 Unknown    Hypertrophy of uterus 6/12/2025 Unknown    Abdominal distention 6/12/2025 Unknown    Stomach discomfort 6/12/2025 Unknown       Plan   1.  Incidental cystotomy/intraoperative bladder injury during robotic assisted laparoscopic hysterectomy for uterine fibroids.  Cystotomy was closed primarily multilayer closure by Dr. Porter with adequate closure and no evidence of any leak.  I would recommend leaving a postoperative drain and checking a drain fluid creatinine prior to discharge and if this is serum the drain can be removed.  Would recommend leaving Mejia catheter in place for 7 to 10 days and doing a cystogram prior to cath removal to ensure bladder is closed without leak.  manage bladder spasms with trospium 20 mg twice daily.  Sublingual Levsin 0.125 mg.prn  Daily prophylactic antibiotic while catheter is in place.    Electronically signed by ODILIA OSMAN MD on 7/8/25 at 1:44 PM CDT

## 2025-07-08 NOTE — H&P
and Perception: Attention and perception normal.         Mood and Affect: Mood and affect normal.         Speech: Speech normal.         Behavior: Behavior normal. Behavior is cooperative.         Thought Content: Thought content normal.         Cognition and Memory: Cognition and memory normal.         Judgment: Judgment normal.           Assessment  Fibroid uterus  Ovarian cysts    Plan  To OR for TLH, BLS, Cystoscopy.  Risks of TLH including bleeding, infection, injury to bowel/bladder/ureters and need for future surgery.  Patient states understanding.  All questions answered.  Consent signed.

## 2025-07-09 PROBLEM — N99.81 INTRAOPERATIVE BLADDER INJURY: Status: ACTIVE | Noted: 2025-07-09

## 2025-07-09 LAB
ANION GAP SERPL CALCULATED.3IONS-SCNC: 7 MMOL/L (ref 8–16)
BUN SERPL-MCNC: 9 MG/DL (ref 6–20)
CALCIUM SERPL-MCNC: 8.5 MG/DL (ref 8.6–10)
CHLORIDE SERPL-SCNC: 105 MMOL/L (ref 98–107)
CO2 SERPL-SCNC: 24 MMOL/L (ref 22–29)
CREAT SERPL-MCNC: 0.8 MG/DL (ref 0.5–0.9)
CREATININE FLUID: 0.9 MG/DL
ERYTHROCYTE [DISTWIDTH] IN BLOOD BY AUTOMATED COUNT: 13.8 % (ref 11.5–14.5)
GLUCOSE SERPL-MCNC: 107 MG/DL (ref 70–99)
HCT VFR BLD AUTO: 32.6 % (ref 37–47)
HGB BLD-MCNC: 11.2 G/DL (ref 12–16)
MCH RBC QN AUTO: 30.3 PG (ref 27–31)
MCHC RBC AUTO-ENTMCNC: 34.4 G/DL (ref 33–37)
MCV RBC AUTO: 88.1 FL (ref 81–99)
PLATELET # BLD AUTO: 271 K/UL (ref 130–400)
PMV BLD AUTO: 8.3 FL (ref 9.4–12.3)
POTASSIUM SERPL-SCNC: 4 MMOL/L (ref 3.5–5.1)
RBC # BLD AUTO: 3.7 M/UL (ref 4.2–5.4)
SODIUM SERPL-SCNC: 136 MMOL/L (ref 136–145)
WBC # BLD AUTO: 12.2 K/UL (ref 4.8–10.8)

## 2025-07-09 PROCEDURE — 36415 COLL VENOUS BLD VENIPUNCTURE: CPT

## 2025-07-09 PROCEDURE — 6370000000 HC RX 637 (ALT 250 FOR IP): Performed by: UROLOGY

## 2025-07-09 PROCEDURE — 1200000000 HC SEMI PRIVATE

## 2025-07-09 PROCEDURE — 6370000000 HC RX 637 (ALT 250 FOR IP): Performed by: OBSTETRICS & GYNECOLOGY

## 2025-07-09 PROCEDURE — 2500000003 HC RX 250 WO HCPCS: Performed by: OBSTETRICS & GYNECOLOGY

## 2025-07-09 PROCEDURE — 2580000003 HC RX 258: Performed by: OBSTETRICS & GYNECOLOGY

## 2025-07-09 PROCEDURE — 6360000002 HC RX W HCPCS: Performed by: OBSTETRICS & GYNECOLOGY

## 2025-07-09 PROCEDURE — 82570 ASSAY OF URINE CREATININE: CPT

## 2025-07-09 PROCEDURE — 94760 N-INVAS EAR/PLS OXIMETRY 1: CPT

## 2025-07-09 PROCEDURE — 80048 BASIC METABOLIC PNL TOTAL CA: CPT

## 2025-07-09 PROCEDURE — 85027 COMPLETE CBC AUTOMATED: CPT

## 2025-07-09 RX ORDER — SIMETHICONE 80 MG
80 TABLET,CHEWABLE ORAL EVERY 6 HOURS PRN
Status: DISCONTINUED | OUTPATIENT
Start: 2025-07-09 | End: 2025-07-10 | Stop reason: HOSPADM

## 2025-07-09 RX ORDER — SULFAMETHOXAZOLE AND TRIMETHOPRIM 400; 80 MG/1; MG/1
1 TABLET ORAL DAILY
Status: DISCONTINUED | OUTPATIENT
Start: 2025-07-09 | End: 2025-07-10 | Stop reason: HOSPADM

## 2025-07-09 RX ADMIN — SULFAMETHOXAZOLE AND TRIMETHOPRIM 1 TABLET: 400; 80 TABLET ORAL at 09:51

## 2025-07-09 RX ADMIN — VERAPAMIL HYDROCHLORIDE 180 MG: 180 TABLET, FILM COATED, EXTENDED RELEASE ORAL at 08:13

## 2025-07-09 RX ADMIN — ACETAMINOPHEN 1000 MG: 500 TABLET ORAL at 20:44

## 2025-07-09 RX ADMIN — ACETAMINOPHEN 1000 MG: 500 TABLET ORAL at 14:36

## 2025-07-09 RX ADMIN — ACETAMINOPHEN 1000 MG: 500 TABLET ORAL at 04:39

## 2025-07-09 RX ADMIN — KETOROLAC TROMETHAMINE 30 MG: 30 INJECTION, SOLUTION INTRAMUSCULAR at 11:32

## 2025-07-09 RX ADMIN — IBUPROFEN 800 MG: 400 TABLET, FILM COATED ORAL at 16:56

## 2025-07-09 RX ADMIN — SODIUM CHLORIDE, SODIUM LACTATE, POTASSIUM CHLORIDE, AND CALCIUM CHLORIDE: .6; .31; .03; .02 INJECTION, SOLUTION INTRAVENOUS at 08:13

## 2025-07-09 RX ADMIN — LISINOPRIL 10 MG: 10 TABLET ORAL at 08:13

## 2025-07-09 RX ADMIN — QUETIAPINE FUMARATE 200 MG: 50 TABLET ORAL at 20:43

## 2025-07-09 RX ADMIN — WATER 2000 MG: 1 INJECTION INTRAMUSCULAR; INTRAVENOUS; SUBCUTANEOUS at 08:12

## 2025-07-09 RX ADMIN — KETOROLAC TROMETHAMINE 30 MG: 30 INJECTION, SOLUTION INTRAMUSCULAR at 04:40

## 2025-07-09 RX ADMIN — SIMETHICONE 80 MG: 80 TABLET, CHEWABLE ORAL at 12:51

## 2025-07-09 RX ADMIN — DOCUSATE SODIUM 100 MG: 100 CAPSULE, LIQUID FILLED ORAL at 08:13

## 2025-07-09 RX ADMIN — DOCUSATE SODIUM 100 MG: 100 CAPSULE, LIQUID FILLED ORAL at 20:44

## 2025-07-09 ASSESSMENT — PAIN DESCRIPTION - ORIENTATION
ORIENTATION: MID
ORIENTATION: MID
ORIENTATION: LOWER
ORIENTATION: MID
ORIENTATION: LOWER
ORIENTATION: MID

## 2025-07-09 ASSESSMENT — PAIN DESCRIPTION - LOCATION
LOCATION: ABDOMEN

## 2025-07-09 ASSESSMENT — PAIN SCALES - GENERAL
PAINLEVEL_OUTOF10: 3
PAINLEVEL_OUTOF10: 4
PAINLEVEL_OUTOF10: 3
PAINLEVEL_OUTOF10: 4
PAINLEVEL_OUTOF10: 4
PAINLEVEL_OUTOF10: 6

## 2025-07-09 ASSESSMENT — PAIN DESCRIPTION - DESCRIPTORS
DESCRIPTORS: ACHING
DESCRIPTORS: SORE
DESCRIPTORS: CRAMPING
DESCRIPTORS: ACHING
DESCRIPTORS: ACHING
DESCRIPTORS: ACHING;SORE

## 2025-07-09 NOTE — OP NOTE
infection present):  No infection present  Other Findings: Large, multi-fibroid uterus.  There are multiple fibroids projecting from every aspect of the uterus including the cervix.  The fallopian tubes are normal.  The right ovary is normal.  The left ovary is enlarged with a unilocular cyst.    Detailed Description of Procedure:     The patient was taken to the operating room where she was placed under general anesthesia.  She was positioned in dorsal lithotomy, prepped and draped in the usual sterile fashion.  A weighted speculum was then placed into the vagina, and the anterior lip of the cervix was grasped with a single-tooth tenaculum.  The cervix was serially dilated to accommodate a medium V-Care Uterine Manipulator, which was placed without complication.      Attention was then turned to the abdomen where a supraumbilical incision was made.  Veress needle was placed.  Placement was confirmed with saline drop test and aspiration of air.  Pneumoperitoneum was created.  Two 8-mm ports were placed, 9 cm on each side of the umbilicus.  An 8-mm assist port was placed in the LLQ.    The robot was docked and the above findings were noted.  bilateral fallopian tubes were freed from the mesosalpinx using the Vessel sealer.  Next the right round ligament was transected using the Vessel sealer.  Entrance was made into the anterior leaf of the broad ligament from the right.  As extension was began toward the midline, a small, full-thickness cystotomy was noted.  Dissection was halted and bladder thoroughly examined.  The left round ligament was transected.  Entrance into the broad ligament was made from this angle.  Dissection was initiated and extended  until the  to the bladder was freed from the lower uterine segment completely.  Bladder was displaced inferiorly.  Bilateral uterine arteries were skeletonized,grasped, cauterized and cut using the Vessel sealer.      Colpotomy was made robotically beginning anteriorly

## 2025-07-09 NOTE — CARE COORDINATION
IMM Letter given to patient. Reviewed, discussed, answered questions, and signed by patient. Signed copy placed in patient's chart.     07/09/25 0927   IMM Letter   IMM Letter given to Patient/Family/Significant other/Guardian/POA/by: given to patient by ROSEMARY GALAVIZ RN BSN    IMM Letter date given: 07/09/25   IMM Letter time given: 0925     Electronically signed by Rosemary Galaviz RN, BSN on 7/9/2025 at 9:27 AM

## 2025-07-10 VITALS
SYSTOLIC BLOOD PRESSURE: 132 MMHG | RESPIRATION RATE: 16 BRPM | DIASTOLIC BLOOD PRESSURE: 91 MMHG | WEIGHT: 165 LBS | OXYGEN SATURATION: 100 % | TEMPERATURE: 98.6 F | BODY MASS INDEX: 30.36 KG/M2 | HEIGHT: 62 IN | HEART RATE: 84 BPM

## 2025-07-10 PROCEDURE — 6370000000 HC RX 637 (ALT 250 FOR IP): Performed by: OBSTETRICS & GYNECOLOGY

## 2025-07-10 PROCEDURE — 94760 N-INVAS EAR/PLS OXIMETRY 1: CPT

## 2025-07-10 PROCEDURE — 6370000000 HC RX 637 (ALT 250 FOR IP): Performed by: UROLOGY

## 2025-07-10 PROCEDURE — 99231 SBSQ HOSP IP/OBS SF/LOW 25: CPT | Performed by: UROLOGY

## 2025-07-10 RX ORDER — IBUPROFEN 800 MG/1
800 TABLET, FILM COATED ORAL EVERY 8 HOURS PRN
Qty: 30 TABLET | Refills: 1 | Status: SHIPPED | OUTPATIENT
Start: 2025-07-10

## 2025-07-10 RX ORDER — SULFAMETHOXAZOLE AND TRIMETHOPRIM 400; 80 MG/1; MG/1
1 TABLET ORAL 2 TIMES DAILY
Qty: 14 TABLET | Refills: 0 | Status: SHIPPED | OUTPATIENT
Start: 2025-07-10 | End: 2025-07-17

## 2025-07-10 RX ORDER — PSEUDOEPHEDRINE HCL 30 MG
100 TABLET ORAL 2 TIMES DAILY
Qty: 30 CAPSULE | Refills: 0 | Status: SHIPPED | OUTPATIENT
Start: 2025-07-10

## 2025-07-10 RX ORDER — TROSPIUM CHLORIDE 20 MG/1
20 TABLET, FILM COATED ORAL 2 TIMES DAILY PRN
Qty: 60 TABLET | Refills: 0 | Status: SHIPPED | OUTPATIENT
Start: 2025-07-10

## 2025-07-10 RX ORDER — OXYCODONE AND ACETAMINOPHEN 5; 325 MG/1; MG/1
1 TABLET ORAL EVERY 4 HOURS PRN
Qty: 18 TABLET | Refills: 0 | Status: SHIPPED | OUTPATIENT
Start: 2025-07-10 | End: 2025-07-13

## 2025-07-10 RX ORDER — HYOSCYAMINE SULFATE 0.12 MG/1
0.12 TABLET SUBLINGUAL EVERY 4 HOURS PRN
Qty: 30 TABLET | Refills: 0 | Status: SHIPPED | OUTPATIENT
Start: 2025-07-10

## 2025-07-10 RX ADMIN — SULFAMETHOXAZOLE AND TRIMETHOPRIM 1 TABLET: 400; 80 TABLET ORAL at 08:16

## 2025-07-10 RX ADMIN — VERAPAMIL HYDROCHLORIDE 180 MG: 180 TABLET, FILM COATED, EXTENDED RELEASE ORAL at 08:16

## 2025-07-10 RX ADMIN — SIMETHICONE 80 MG: 80 TABLET, CHEWABLE ORAL at 08:16

## 2025-07-10 RX ADMIN — HYOSCYAMINE SULFATE 0.12 MG: 0.12 TABLET SUBLINGUAL at 08:16

## 2025-07-10 RX ADMIN — IBUPROFEN 800 MG: 400 TABLET, FILM COATED ORAL at 08:16

## 2025-07-10 RX ADMIN — LISINOPRIL 10 MG: 10 TABLET ORAL at 08:16

## 2025-07-10 ASSESSMENT — PAIN DESCRIPTION - DESCRIPTORS: DESCRIPTORS: ACHING;SORE

## 2025-07-10 ASSESSMENT — PAIN DESCRIPTION - ORIENTATION: ORIENTATION: LOWER

## 2025-07-10 ASSESSMENT — PAIN SCALES - GENERAL: PAINLEVEL_OUTOF10: 5

## 2025-07-10 ASSESSMENT — PAIN DESCRIPTION - LOCATION: LOCATION: ABDOMEN;PELVIS

## 2025-07-10 NOTE — DISCHARGE SUMMARY
Post-Op Day 2: Laparoscopic hysterectomy, b/l salpingectomy, cystoscopy, left ovarian cystectomy, cystotomy repair    Pt is lying in bed. The patient feels well. C/o \"gas pains.\" The patient denies emotional concerns. Pain is well controlled with current medications.  Urinary output is adequate- clear, yellow to f/c at bedside. The patient is ambulating well. The patient is tolerating a normal diet. Flatus has been passed.    Objective:      Patient Vitals for the past 8 hrs:  Vitals:    07/10/25 1147   BP: (!) 132/91   Pulse: 84   Resp: 16   Temp: 98.6 °F (37 °C)   SpO2: 100%       General:    alert, appears stated age, and cooperative   Bowel Sounds:  active   Lungs:  CTAB   Abdomen:   Soft, non tender, abdominal binder in place, J/p drain draining serosanguinous fluid   Incision:  healing well, no significant drainage, no dehiscence, no significant erythema, lap sites healing well with steri strips   DVT Evaluation:  No evidence of DVT seen on physical exam.     Lab Results   Component Value Date    WBC 12.2 (H) 07/09/2025    HGB 11.2 (L) 07/09/2025    HCT 32.6 (L) 07/09/2025    MCV 88.1 07/09/2025     07/09/2025     Assessment:     Status post hysterectomy, cystotomy. Doing well postoperatively.     Plan:     J/p drain removed without difficulty. Emptied 30cc. Covered with gauze and abdominal binder reapplied. Discussed continuing stool softeners, ambulation. Pt reports she has Mylicon at home as well. Will continue F/c at home, leg bag provided. Reviewed Dr Ayala's notes and will continue Bactrim while F/c in place. Will also send in Trospium for as needed use for spasms. Gave standard precautions with pt understanding and will f/u in 2 weeks or sooner with any problems. F/u in 1 week with Dr Ayala.     Over 50% of the total visit time of 35 minutes was spent on counseling and/or coordination of care, j/p drain removal. All questions were answered and the patient voiced understanding.

## 2025-07-10 NOTE — PROGRESS NOTES
Urology Progress Note      SUBJECTIVE: Patient reports feeling gassy some mild bladder urgency    OBJECTIVE: POD #1 catheter day #1, urine output 3600, drain output 375, only 120 overnight serosanguineous    REVIEW OF SYSTEMS:   Review of Systems      Physical  VITALS:  /76   Pulse 86   Temp 98.8 °F (37.1 °C) (Temporal)   Resp 18   Ht 1.575 m (5' 2\")   Wt 74.8 kg (165 lb)   LMP 2025 (Exact Date)   SpO2 99%   BMI 30.18 kg/m²   TEMPERATURE:  Current - Temp: 98.8 °F (37.1 °C); Max - Temp  Av.8 °F (36.6 °C)  Min: 96.3 °F (35.7 °C)  Max: 98.8 °F (37.1 °C)   24 HR I&O   Intake/Output Summary (Last 24 hours) at 2025 0739  Last data filed at 2025 0455  Gross per 24 hour   Intake 2717 ml   Output 4325 ml   Net -1608 ml     BACK: no tenderness in spine or flanks  ABDOMEN:  soft, distended, and non-tender  HEART:  normal rate and regular rhythm  CHEST: Normal respiratory effort  GENITAL/URINARY: Mejia catheter secured with StatLock draining clear urine    Data       CBC:   Recent Labs     25  0423   WBC 12.2*   HGB 11.2*   HCT 32.6*        BMP:    Recent Labs     25  0423      K 4.0      CO2 24   BUN 9   CREATININE 0.8   GLUCOSE 107*       No results for input(s): \"LABURIN\" in the last 72 hours.  No results for input(s): \"BC\" in the last 72 hours.  No results for input(s): \"BLOODCULT2\" in the last 72 hours.    Radiology:      Imaging studies:      ASSESSMENT AND PLAN    Patient Active Problem List   Diagnosis    Hypertension    Sleep apnea    Schizophrenia (HCC)    Herpes    Depression    Leiomyoma of uterus, unspecified    Hypertrophy of uterus    Abdominal distention    Stomach discomfort    S/P laparoscopic hysterectomy    Left ovarian cyst       1.  Intraoperative bladder injury.  Status post primary repair of bladder Dr. Correa.  See my consult note for further recommendations.  Would make trospium as needed to avoid side effects of constipation.  Would 
4 Eyes Skin Assessment     NAME:  Torito Salgado  YOB: 1980  MEDICAL RECORD NUMBER:  [id number]    The patient is being assessed for  Admission    I agree that at least one RN has performed a thorough Head to Toe Skin Assessment on the patient. ALL assessment sites listed below have been assessed.      Areas assessed by both nurses:    Head, Face, Ears, Shoulders, Back, Chest, Arms, Elbows, Hands, Sacrum. Buttock, Coccyx, Ischium, and Legs. Feet and Heels        Does the Patient have a Wound? No noted wound(s)       Chaz Prevention initiated by RN: No  Wound Care Orders initiated by RN: No    Pressure Injury (Stage 1,2,3,4, Unstageable, DTI, NWPT, and Complex wounds) if present, place Wound referral order by RN under : No    New Ostomies, if present place, Ostomy referral order under : No     Nurse 1 eSignature: Electronically signed by Alex Ha RN on 7/8/25 at 4:58 PM CDT    **SHARE this note so that the co-signing nurse can place an eSignature**    Nurse 2 eSignature: Electronically signed by Estelle Menendez LPN on 7/8/25 at 6:33 PM CDT  
Called Dr. Ortiz r/t high BP. Gave the labetalol 5mg with no improvement. New order for hydralazine 10mg.  
Department of Gynecology  Attending Progress Note          SUBJECTIVE:  Pt states she feels ok and is tolerating liquids. Pt admits to +flatus.    OBJECTIVE:           Physical Exam  VITALS:  /77   Pulse 97   Temp 99.1 °F (37.3 °C) (Oral)   Resp 20   Ht 1.575 m (5' 2\")   Wt 74.8 kg (165 lb)   LMP 2025 (Exact Date)   SpO2 98%   BMI 30.18 kg/m²     General - WDWN female in NAD  Neck - supple  Lungs - CTA bilaterally  Cardio - [id number] no M/G/R  Abd - soft, NT, ND, bowel sounds present; incisions C/D/I  Extremities - no C/C/E    DATA:  Labs reviewed    ASSESSMENT AND PLAN:  43y/o  POD#1 S/P RATLH, bilateral salpingectomy, left ovarian cystectomy, unintentional cystotomy with repair, and cystoscopy, and pt is hemodynamically stable. Pt is tolerating liquids with +flatus. Pt is being followed by Urology. Will continue present management.    Principal Problem:    S/P laparoscopic hysterectomy  Plan: Continue present management  Active Problems:    Leiomyoma of uterus, unspecified  Plan: Continue present management    Hypertrophy of uterus  Plan: Continue present management    Abdominal distention  Plan: Continue present management    Stomach discomfort  Plan: Continue present management    Left ovarian cyst  Plan: Continue present management    Intraoperative bladder injury  Plan: Continue present management    
Dr. Ayala at bedside.  
Dr. Ellison called to OR per Dr. Correa. Electronically signed by Sonia Hernandez RN on 7/8/2025 at 9:39 AM   
Spiritual Health History and Assessment/Progress Note  [facility]    (P) Loneliness/Social Isolation, (P) Emotional distress, (P) Adjustment to illness,      Name: Torito Salgado MRN: [id number]    Age: 44 y.o.     Sex: female   Language: English   Nondenominational: None   S/P laparoscopic hysterectomy     Date: 7/9/2025            Total Time Calculated: (P) 10 min              Spiritual Assessment began in [facility] SURG [facility]        Referral/Consult From: (P) Rounding   Encounter Overview/Reason: (P) Loneliness/Social Isolation  Service Provided For: (P) Patient    [place], Belief, Meaning:   Patient identifies as spiritual. The patient was raised in a [employer] tradition.  Family/Friends are connected with a urszula tradition or spiritual practice      Importance and Influence:  Patient has spiritual/personal beliefs that influence decisions regarding their health. Bible, prayer and attending Sikhism was part of her life style.  Family/Friends No family/friends present    Community:  Patient is connected with a spiritual community. She involved in the family affairs and connected with her extended families.  Family/Friends feel well-supported. Support system includes: [place] Community    Assessment and Plan of Care:   The patients said she was concern of her pain and due to the discomfort her stress level was raised up.  She has friend to communicate with her and be her emotional support.    Patient Interventions include: Facilitated expression of thoughts and feelings, Explored spiritual coping/struggle/distress, and Engaged in theological reflection. The patient was comfortable with sharing her stories of spiritual journey.  Kind words and encouragements shared with the patient.    Family/Friends Interventions include: No family/friends present    Patient Plan of Care: No spiritual needs identified for follow-up  Family/Friends Plan of Care: No future visits per patient/family request    Electronically signed by 
Leave Mejia catheter for 7 to 10 days can follow-up in the office with cystogram prior.  Otherwise routine post op care after hysterectomy per Dr. Correa.  Drain fluid creatinine cw serum and out put decreased Okay to pull drain prior to DC.      No further recommendations will signoff    Dimitri Ayala MD

## 2025-07-21 ENCOUNTER — OFFICE VISIT (OUTPATIENT)
Dept: UROLOGY | Age: 45
End: 2025-07-21
Payer: MEDICARE

## 2025-07-21 ENCOUNTER — HOSPITAL ENCOUNTER (OUTPATIENT)
Dept: CT IMAGING | Age: 45
Discharge: HOME | End: 2025-07-21
Attending: UROLOGY
Payer: MEDICARE

## 2025-07-21 ENCOUNTER — HOSPITAL ENCOUNTER (OUTPATIENT)
Dept: GENERAL RADIOLOGY | Age: 45
Discharge: HOME | End: 2025-07-21
Attending: UROLOGY
Payer: MEDICARE

## 2025-07-21 VITALS — BODY MASS INDEX: 29.88 KG/M2 | HEIGHT: 62 IN | TEMPERATURE: 97.8 F | WEIGHT: 162.4 LBS

## 2025-07-21 DIAGNOSIS — N99.81 INTRAOPERATIVE BLADDER INJURY: ICD-10-CM

## 2025-07-21 DIAGNOSIS — N99.81 INTRAOPERATIVE BLADDER INJURY: Primary | ICD-10-CM

## 2025-07-21 PROCEDURE — 99213 OFFICE O/P EST LOW 20 MIN: CPT | Performed by: UROLOGY

## 2025-07-21 PROCEDURE — 6360000004 HC RX CONTRAST MEDICATION: Performed by: UROLOGY

## 2025-07-21 PROCEDURE — 51700 IRRIGATION OF BLADDER: CPT | Performed by: UROLOGY

## 2025-07-21 PROCEDURE — 72194 CT PELVIS W/O & W/DYE: CPT

## 2025-07-21 PROCEDURE — G8417 CALC BMI ABV UP PARAM F/U: HCPCS | Performed by: UROLOGY

## 2025-07-21 PROCEDURE — 1036F TOBACCO NON-USER: CPT | Performed by: UROLOGY

## 2025-07-21 PROCEDURE — G8427 DOCREV CUR MEDS BY ELIG CLIN: HCPCS | Performed by: UROLOGY

## 2025-07-21 PROCEDURE — 1111F DSCHRG MED/CURRENT MED MERGE: CPT | Performed by: UROLOGY

## 2025-07-21 PROCEDURE — 74455 X-RAY URETHRA/BLADDER: CPT

## 2025-07-21 RX ADMIN — DIATRIZOATE MEGLUMINE 150 ML: 300 INJECTION, SOLUTION INTRAVENOUS at 11:44

## 2025-07-21 ASSESSMENT — ENCOUNTER SYMPTOMS
BACK PAIN: 0
NAUSEA: 0
VOMITING: 0
ABDOMINAL DISTENTION: 0
ABDOMINAL PAIN: 0

## 2025-07-21 NOTE — PROGRESS NOTES
Torito Salgado is a 44 y.o. female who presents today No chief complaint on file.      Post-Operative Follow-up  Patient here for post-op follow-up. Patient is 1 week status post repair of intraoperative bladder injury that occurred during a robot assisted laparoscopic hysterectomy done by Dr. Correa.  The bladder was primarily repaired with robotically by Dr. Correa.  After 1 week of bladder decompression with cystogram.  She otherwise been tolerating the Mejia catheter well.  She is day 13 of catheter she has cystogram prior to the office appointment today        Past Medical History:   Diagnosis Date    Anxiety     Bipolar disorder (HCC)     Chronic back pain     Depression     Headache     Herpes     thinks it is type 1    High cholesterol     Hypertension     Schizophrenia (HCC)     Sleep apnea     no cpap       Past Surgical History:   Procedure Laterality Date    COLONOSCOPY N/A 05/24/2024    Dr Pina, int hem Gr 1, 10 year recall    COSMETIC SURGERY      nose    HYSTERECTOMY (CERVIX STATUS UNKNOWN) Bilateral 7/8/2025    ROBOTIC TOTAL LAPAROSCOPIC ABDOMINAL HYSTERECTOMY, BILATERAL SALPINGECTOMY, CYSTOSCOPY, LEFT OVARIAN CYSTECTOMY, CYSTOTOMY REPAIR performed by Kalani Bernard MD at [facility] OR       Current Outpatient Medications   Medication Sig Dispense Refill    ibuprofen (ADVIL;MOTRIN) 800 MG tablet Take 1 tablet by mouth every 8 hours as needed for Pain 30 tablet 1    docusate sodium (COLACE, DULCOLAX) 100 MG CAPS Take 100 mg by mouth 2 times daily 30 capsule 0    hyoscyamine (LEVSIN/SL) 0.125 MG sublingual tablet Place 1 tablet under the tongue every 4 hours as needed for Cramping (Bladder spasms) 30 tablet 0    trospium (SANCTURA) 20 MG tablet Take 1 tablet by mouth 2 times daily as needed (Bladder spasms) 60 tablet 0    fluticasone (FLONASE) 50 MCG/ACT nasal spray 2 sprays by Each Nostril route daily 16 g 0    lisinopril (PRINIVIL;ZESTRIL) 10 MG tablet TAKE 1 TABLET BY MOUTH DAILY 90

## 2025-07-21 NOTE — PROGRESS NOTES
Patient of Dr. Ayala presents today for voiding trial post intra-op bladder injury. The patient denies any fever, chills or  N&V. After patient had given consent, using the catheter in place, 180cc of sterile water was installed into the bladder with no complications. Patient was able to void 200cc.     Dr. Ayala was in office at time of procedure.     Patient was advised to drink clear fluids for the next couple hours and urinate. Patient was advised they may experience some blood in the urine and burning with urination for the next couple days. If the patient is unable to urinate or develops fever, chills, N&V or suprapubic pain, they will call office for an appt at clinic or seek medical treatment at nearest ER, PCP or Urgent Care if after hours. Patient verbalized understanding and all questions were answered.Patient advised to call the office with any questions or concerns.     Patient is to follow up as scheduled.

## 2025-07-24 ENCOUNTER — OFFICE VISIT (OUTPATIENT)
Dept: OBGYN CLINIC | Age: 45
End: 2025-07-24

## 2025-07-24 VITALS
SYSTOLIC BLOOD PRESSURE: 141 MMHG | BODY MASS INDEX: 30.18 KG/M2 | HEIGHT: 62 IN | HEART RATE: 79 BPM | DIASTOLIC BLOOD PRESSURE: 105 MMHG | WEIGHT: 164 LBS

## 2025-07-24 DIAGNOSIS — Z09 POSTOPERATIVE EXAMINATION: Primary | ICD-10-CM

## 2025-07-24 PROCEDURE — 99024 POSTOP FOLLOW-UP VISIT: CPT | Performed by: OBSTETRICS & GYNECOLOGY

## 2025-07-24 ASSESSMENT — ENCOUNTER SYMPTOMS
GASTROINTESTINAL NEGATIVE: 1
RESPIRATORY NEGATIVE: 1

## 2025-07-24 NOTE — PROGRESS NOTES
Blood Pressure Mother     High Cholesterol Mother     Alcohol Abuse Father     Depression Father     Breast Cancer Paternal Aunt         age unknown    Breast Cancer Paternal Aunt     Ovarian Cancer Maternal Grandmother         age unknown    Cancer Maternal Grandmother     Mental Illness Maternal Grandmother      Social History     Tobacco Use    Smoking status: Never    Smokeless tobacco: Never   Vaping Use    Vaping status: Never Used   Substance Use Topics    Alcohol use: Never    Drug use: Never     Current Outpatient Medications on File Prior to Visit   Medication Sig Dispense Refill    ibuprofen (ADVIL;MOTRIN) 800 MG tablet Take 1 tablet by mouth every 8 hours as needed for Pain 30 tablet 1    docusate sodium (COLACE, DULCOLAX) 100 MG CAPS Take 100 mg by mouth 2 times daily 30 capsule 0    hyoscyamine (LEVSIN/SL) 0.125 MG sublingual tablet Place 1 tablet under the tongue every 4 hours as needed for Cramping (Bladder spasms) 30 tablet 0    trospium (SANCTURA) 20 MG tablet Take 1 tablet by mouth 2 times daily as needed (Bladder spasms) 60 tablet 0    fluticasone (FLONASE) 50 MCG/ACT nasal spray 2 sprays by Each Nostril route daily 16 g 0    lisinopril (PRINIVIL;ZESTRIL) 10 MG tablet TAKE 1 TABLET BY MOUTH DAILY 90 tablet 1    verapamil (CALAN SR) 180 MG extended release tablet TAKE 1 TABLET BY MOUTH DAILY 30 tablet 5    AUSTEDO 12 MG TABS Take 12 mg by mouth in the morning and at bedtime      norethindrone (ORTHO MICRONOR) 0.35 MG tablet Take 1 tablet by mouth daily 84 tablet 3    acyclovir (ZOVIRAX) 400 MG tablet Take 1 tablet by mouth 5 times daily (Patient taking differently: Take 1 tablet by mouth 4 times daily as needed) 60 tablet 2    Multiple Vitamin (MULTI VITAMIN PO) Take 1 tablet by mouth daily      QUEtiapine (SEROQUEL) 200 MG tablet Take 1 tablet by mouth at bedtime      diphenhydrAMINE (BENADRYL) 25 MG capsule Take 1 capsule by mouth every 6 hours as needed      acetaminophen (TYLENOL) 500 MG

## 2025-08-07 ENCOUNTER — OFFICE VISIT (OUTPATIENT)
Dept: OBGYN CLINIC | Age: 45
End: 2025-08-07

## 2025-08-07 ENCOUNTER — OFFICE VISIT (OUTPATIENT)
Dept: UROLOGY | Age: 45
End: 2025-08-07
Payer: MEDICARE

## 2025-08-07 VITALS
SYSTOLIC BLOOD PRESSURE: 150 MMHG | BODY MASS INDEX: 30.18 KG/M2 | DIASTOLIC BLOOD PRESSURE: 89 MMHG | HEIGHT: 62 IN | HEART RATE: 92 BPM | WEIGHT: 164 LBS

## 2025-08-07 VITALS — BODY MASS INDEX: 30.36 KG/M2 | TEMPERATURE: 97.5 F | HEIGHT: 62 IN | WEIGHT: 165 LBS

## 2025-08-07 DIAGNOSIS — N99.81 INTRAOPERATIVE BLADDER INJURY: Primary | ICD-10-CM

## 2025-08-07 DIAGNOSIS — Z09 POSTOPERATIVE EXAMINATION: Primary | ICD-10-CM

## 2025-08-07 DIAGNOSIS — N89.8 VAGINAL ODOR: ICD-10-CM

## 2025-08-07 LAB
APPEARANCE FLUID: CLEAR
BILIRUBIN, POC: NORMAL
BLOOD URINE, POC: NORMAL
CLARITY, POC: CLEAR
COLOR, POC: YELLOW
GLUCOSE URINE, POC: NORMAL MG/DL
KETONES, POC: NORMAL MG/DL
LEUKOCYTE EST, POC: NORMAL
NITRITE, POC: NORMAL
PH, POC: 7
PROTEIN, POC: NORMAL MG/DL
SPECIFIC GRAVITY, POC: 1.02
UROBILINOGEN, POC: 0.2 MG/DL

## 2025-08-07 PROCEDURE — G8417 CALC BMI ABV UP PARAM F/U: HCPCS | Performed by: UROLOGY

## 2025-08-07 PROCEDURE — 81002 URINALYSIS NONAUTO W/O SCOPE: CPT | Performed by: UROLOGY

## 2025-08-07 PROCEDURE — 1036F TOBACCO NON-USER: CPT | Performed by: UROLOGY

## 2025-08-07 PROCEDURE — 99024 POSTOP FOLLOW-UP VISIT: CPT | Performed by: OBSTETRICS & GYNECOLOGY

## 2025-08-07 PROCEDURE — 1111F DSCHRG MED/CURRENT MED MERGE: CPT | Performed by: UROLOGY

## 2025-08-07 PROCEDURE — 99213 OFFICE O/P EST LOW 20 MIN: CPT | Performed by: UROLOGY

## 2025-08-07 PROCEDURE — G8427 DOCREV CUR MEDS BY ELIG CLIN: HCPCS | Performed by: UROLOGY

## 2025-08-07 RX ORDER — METRONIDAZOLE 500 MG/1
500 TABLET ORAL 2 TIMES DAILY
Qty: 14 TABLET | Refills: 0 | Status: SHIPPED | OUTPATIENT
Start: 2025-08-07 | End: 2025-08-14

## 2025-08-07 ASSESSMENT — ENCOUNTER SYMPTOMS
GASTROINTESTINAL NEGATIVE: 1
RESPIRATORY NEGATIVE: 1

## (undated) DEVICE — CLEANING SPONGE: Brand: KOALA™

## (undated) DEVICE — SYRINGE MED 10ML TRNSLUC BRL PLUNG BLK MRK POLYPR CTRL

## (undated) DEVICE — TUBING, SUCTION, 1/4" X 20', STRAIGHT: Brand: MEDLINE INDUSTRIES, INC.

## (undated) DEVICE — SYRINGE MED 50ML LUERLOCK TIP

## (undated) DEVICE — STRIP SKIN CLSR W0.25XL4IN WHT SPUNBOUND FBR NYL HI ADH

## (undated) DEVICE — BLADELESS OBTURATOR: Brand: WECK VISTA

## (undated) DEVICE — INSUFFLATION NEEDLE TO ESTABLISH PNEUMOPERITONEUM.: Brand: INSUFFLATION NEEDLE

## (undated) DEVICE — SUCTION IRRIGATOR: Brand: ENDOWRIST

## (undated) DEVICE — Y-TYPE TUR/BLADDER IRRIGATION SET, REGULATING CLAMP

## (undated) DEVICE — TRI-LUMEN FILTERED TUBE SET WITH ACTIVATED CHARCOAL FILTER: Brand: AIRSEAL

## (undated) DEVICE — TIP COVER ACCESSORY

## (undated) DEVICE — SUTURE N ABSRB MONOFILAMENT 2-0 FSLX 75 CM 36 MM ETHILON

## (undated) DEVICE — DAVINCI: Brand: MEDLINE INDUSTRIES, INC.

## (undated) DEVICE — ENDO KIT,LOURDES HOSPITAL: Brand: MEDLINE INDUSTRIES, INC.

## (undated) DEVICE — JACKSON-PRATT 100CC BULB RESERVOIR: Brand: CARDINAL HEALTH

## (undated) DEVICE — GAUZE,SPONGE,4"X4",8PLY,STRL,LF,10/TRAY: Brand: MEDLINE

## (undated) DEVICE — DRESSING HEMSTAT W1X2IN ABSRB SURGCEL SNOW

## (undated) DEVICE — COVER LT HNDL BLU PLAS

## (undated) DEVICE — VCARE MEDIUM, UTERINE MANIPULATOR, VAGINAL-CERVICAL-AHLUWALIA'S-RETRACTOR-ELEVATOR: Brand: VCARE

## (undated) DEVICE — COLUMN DRAPE

## (undated) DEVICE — ARM DRAPE

## (undated) DEVICE — CANNULA NSL AD L7FT DIV O2 CO2 W/ M LUERLOCK TRMPT CONN

## (undated) DEVICE — VESSEL SEALER EXTEND: Brand: ENDOWRIST

## (undated) DEVICE — SINGLE PORT MANIFOLD: Brand: NEPTUNE 2

## (undated) DEVICE — 40595 XL TRENDELENBURG POSITIONING KIT: Brand: 40595 XL TRENDELENBURG POSITIONING KIT

## (undated) DEVICE — SEAL ENDO INSTR SELF SEAL UROLOGY

## (undated) DEVICE — HYPODERMIC SAFETY NEEDLE: Brand: MAGELLAN

## (undated) DEVICE — GLOVE SURG SZ 65 CRM LTX FREE POLYISOPRENE POLYMER BEAD ANTI

## (undated) DEVICE — TUBING STRL INST 8INX100FT

## (undated) DEVICE — GLOVE SURG SZ 75 CRM LTX FREE POLYISOPRENE POLYMER BEAD ANTI

## (undated) DEVICE — APPLICATOR LAP 35 CM 2 RIGID VISTASEAL

## (undated) DEVICE — SUTURE ABSORBABLE MONOFILAMENT 0 CT-2 12 IN STRATAFIX SPRL SXPP1B405

## (undated) DEVICE — SUTURE VICRYL SZ 0 L36IN ABSRB UD L36MM CT-1 1/2 CIR J946H

## (undated) DEVICE — MEDI-VAC YANKAUER SUCTION HANDLE W/STRAIGHT TIP & CONTROL VENT: Brand: CARDINAL HEALTH

## (undated) DEVICE — SOLUTION IV 1000ML 0.9% SOD CHL FOR IRRIG PLAS CONT

## (undated) DEVICE — SUPPLEMENT DIGESTIVE H2O SOL GI-EASE

## (undated) DEVICE — GOWN, ORBIS, XLONG/XLARGE, STERILE: Brand: MEDLINE

## (undated) DEVICE — VCARE LARGE UTERINE MANIPULATOR: Brand: VCARE LARGE

## (undated) DEVICE — SEAL

## (undated) DEVICE — DRAPE,UTILITY,XL,4/PK,STERILE: Brand: MEDLINE

## (undated) DEVICE — CURAVIEW LED LARYNGOSCOPE BLADE & HANDLE,DISPOSABLE,MAC 2: Brand: CURAPLEX

## (undated) DEVICE — DRAIN JACKSON PRATT ROUND 15FR: Brand: CARDINAL HEALTH

## (undated) DEVICE — ADAPTER CLEANING PORPOISE CLEANING

## (undated) DEVICE — SUTURE V-LOC 180 SZ 3-0 L6IN ABSRB GRN V-20 L26MM 1/2 CIR VLOCL0604

## (undated) DEVICE — SEALANT TISS 10 ML FIBRIN VISTASEAL

## (undated) DEVICE — PASSER SUT TRANSOSSEOUS MALL SHFT DURABLE NIT CAPTURE LOOP

## (undated) DEVICE — POUCH SPEC RETRV SHFT 15MM 13X23CM GRN POLYUR DBL RNG HNDL

## (undated) DEVICE — SUTURE MONOCRYL SZ 4-0 L18IN ABSRB UD L19MM PS-2 3/8 CIR PRIM Y496G

## (undated) DEVICE — BINDER ABD SM M W12IN CIRC 30 45IN 4 PNL E CNTCT CLSR POSTOP

## (undated) DEVICE — TUBE ET 7.5MM NSL ORAL BASIC CUF INTMED MURPHY EYE RADPQ

## (undated) DEVICE — SUTURE VICRYL + SZ 3-0 L27IN ABSRB UD L26MM SH 1/2 CIR VCP416H

## (undated) DEVICE — SOLUTION ANTIFOG VIS SYS CLEARIFY LAPSCP

## (undated) DEVICE — AIRSEAL 8 MM ACCESS PORT AND LOW PROFILE OBTURATOR WITH BLADELESS OPTICAL TIP, 120 MM LENGTH: Brand: AIRSEAL

## (undated) DEVICE — BRUSH ENDOSCP 2 END CHN HEDGEHOG

## (undated) DEVICE — NEPTUNE E-SEP SMOKE EVACUATION PENCIL, COATED, 70MM BLADE, ROCKER SWITCH: Brand: NEPTUNE E-SEP